# Patient Record
Sex: FEMALE | Race: WHITE | NOT HISPANIC OR LATINO | Employment: OTHER | URBAN - METROPOLITAN AREA
[De-identification: names, ages, dates, MRNs, and addresses within clinical notes are randomized per-mention and may not be internally consistent; named-entity substitution may affect disease eponyms.]

---

## 2017-03-01 ENCOUNTER — OFFICE VISIT (OUTPATIENT)
Dept: MEDICAL GROUP | Facility: LAB | Age: 67
End: 2017-03-01
Payer: MEDICARE

## 2017-03-01 VITALS
RESPIRATION RATE: 16 BRPM | HEART RATE: 112 BPM | OXYGEN SATURATION: 93 % | HEIGHT: 66 IN | DIASTOLIC BLOOD PRESSURE: 102 MMHG | BODY MASS INDEX: 33.06 KG/M2 | TEMPERATURE: 98.1 F | SYSTOLIC BLOOD PRESSURE: 148 MMHG | WEIGHT: 205.69 LBS

## 2017-03-01 DIAGNOSIS — Z13.220 ENCOUNTER FOR LIPID SCREENING FOR CARDIOVASCULAR DISEASE: ICD-10-CM

## 2017-03-01 DIAGNOSIS — E66.9 OBESITY (BMI 30-39.9): ICD-10-CM

## 2017-03-01 DIAGNOSIS — E78.2 MIXED HYPERLIPIDEMIA: ICD-10-CM

## 2017-03-01 DIAGNOSIS — Z12.31 SCREENING MAMMOGRAM, ENCOUNTER FOR: ICD-10-CM

## 2017-03-01 DIAGNOSIS — I10 ESSENTIAL HYPERTENSION: ICD-10-CM

## 2017-03-01 DIAGNOSIS — Z13.6 ENCOUNTER FOR LIPID SCREENING FOR CARDIOVASCULAR DISEASE: ICD-10-CM

## 2017-03-01 DIAGNOSIS — Z00.00 HEALTH MAINTENANCE EXAMINATION: ICD-10-CM

## 2017-03-01 DIAGNOSIS — M17.0 PRIMARY OSTEOARTHRITIS OF BOTH KNEES: ICD-10-CM

## 2017-03-01 DIAGNOSIS — Z13.1 SCREENING FOR DIABETES MELLITUS: ICD-10-CM

## 2017-03-01 PROCEDURE — 3014F SCREEN MAMMO DOC REV: CPT | Performed by: FAMILY MEDICINE

## 2017-03-01 PROCEDURE — 1101F PT FALLS ASSESS-DOCD LE1/YR: CPT | Performed by: FAMILY MEDICINE

## 2017-03-01 PROCEDURE — 1036F TOBACCO NON-USER: CPT | Performed by: FAMILY MEDICINE

## 2017-03-01 PROCEDURE — G8432 DEP SCR NOT DOC, RNG: HCPCS | Performed by: FAMILY MEDICINE

## 2017-03-01 PROCEDURE — G8419 CALC BMI OUT NRM PARAM NOF/U: HCPCS | Performed by: FAMILY MEDICINE

## 2017-03-01 PROCEDURE — G8482 FLU IMMUNIZE ORDER/ADMIN: HCPCS | Performed by: FAMILY MEDICINE

## 2017-03-01 PROCEDURE — 4040F PNEUMOC VAC/ADMIN/RCVD: CPT | Mod: 8P | Performed by: FAMILY MEDICINE

## 2017-03-01 PROCEDURE — 99204 OFFICE O/P NEW MOD 45 MIN: CPT | Performed by: FAMILY MEDICINE

## 2017-03-01 PROCEDURE — 3017F COLORECTAL CA SCREEN DOC REV: CPT | Performed by: FAMILY MEDICINE

## 2017-03-01 RX ORDER — LOSARTAN POTASSIUM 25 MG/1
25 TABLET ORAL DAILY
COMMUNITY
End: 2017-03-01 | Stop reason: SDUPTHER

## 2017-03-01 RX ORDER — LOSARTAN POTASSIUM 25 MG/1
25 TABLET ORAL DAILY
Qty: 90 TAB | Refills: 3 | Status: SHIPPED | OUTPATIENT
Start: 2017-03-01 | End: 2017-09-20 | Stop reason: SDUPTHER

## 2017-03-01 RX ORDER — ASCORBIC ACID 500 MG
500 TABLET ORAL DAILY
COMMUNITY

## 2017-03-01 RX ORDER — OMEGA-3 FATTY ACIDS/FISH OIL 300-1000MG
CAPSULE ORAL
COMMUNITY
End: 2017-09-20

## 2017-03-01 RX ORDER — ASPIRIN 81 MG/1
81 TABLET, CHEWABLE ORAL DAILY
COMMUNITY
End: 2020-10-27

## 2017-03-01 RX ORDER — ACETAMINOPHEN 500 MG
500-1000 TABLET ORAL EVERY 6 HOURS PRN
COMMUNITY
End: 2020-10-27

## 2017-03-01 ASSESSMENT — PATIENT HEALTH QUESTIONNAIRE - PHQ9: CLINICAL INTERPRETATION OF PHQ2 SCORE: 0

## 2017-03-01 NOTE — PROGRESS NOTES
"Jasiel Garrett is a 66 y.o. female here for   Chief Complaint   Patient presents with   • Establish Care       HPI:  Jasiel is a very pleasant 66 y.o. female. She is here today with her  Ernesto. She is retired from the school district. She does not have any children.    1. Mixed hyperlipidemia  This is chronic. The last labs were done in August 2017. She has not been on any medications for this. Her last LDL was 120. She has gained about 30 pounds over the last few months since she moved to [place]. She does not get much exercise.    2. Essential hypertension  This is chronic. Patient is currently on losartan 25 mg daily and aspirin 81 mg daily. She comes in with a blood pressure log today. Her home blood pressure readings are 110-130/70-80. She states that he has been under good control. She does have a wrist blood pressure cuff but her  was a previous ENT and takes her blood pressure manually at times. She denies any headache, chest pain, shortness of breath, lower extremity edema.    3. Obesity (BMI 30-39.9)  This is a new problem. Patient waited about 30 pounds lighter several months ago. She attributes her weight gain to decreased mood and motivation after her move to [place] and the death of her dog. She does not get much exercise because of her arthritis.    4. Primary osteoarthritis of both knees  This is chronic. By report, she is \"bone to bone \"on her knees. This very much decreases her physical activity. She is not considering a total knee replacement as of yet. She has considered steroid injections.    5. Health maintenance examination  Pap: Last done last year, up to date  Mammogram: Last done 2015, patient is due   Colonoscopy: Last done 2016, up to date  DEXA: Last done 2015, up to date  Tetanus booster: Last done over 10 years ago  Pneumonia vaccine: Patient has received both  Shingles vaccine: Done   Flu vaccine: Done yearly   ASA 81mg > 65yrs: Taking regularly   Diet: sweet tooth, otherwise " "healthy whole eating  Exercise: rare d/t pain      Current medicines (including changes today)  Current Outpatient Prescriptions   Medication Sig Dispense Refill   • aspirin (ASA) 81 MG Chew Tab chewable tablet Take 81 mg by mouth every day.     • ascorbic acid (ASCORBIC ACID) 500 MG Tab Take 500 mg by mouth every day.     • vitamin D (CHOLECALCIFEROL) 1000 UNIT Tab Take 1,000 Units by mouth every day.     • Omega 3 1000 MG Cap Take  by mouth.     • acetaminophen (TYLENOL) 500 MG Tab Take 500-1,000 mg by mouth every 6 hours as needed.     • losartan (COZAAR) 25 MG Tab Take 1 Tab by mouth every day. 90 Tab 3     No current facility-administered medications for this visit.     She  has a past medical history of Hypertension and Hyperlipidemia.  She  has past surgical history that includes hysteroscopy with video diagnostic.  Social History   Substance Use Topics   • Smoking status: Former Smoker -- 0.50 packs/day for 20 years     Quit date: 1997   • Smokeless tobacco: Never Used   • Alcohol Use: 1.2 oz/week     2 Glasses of wine per week     Social History     Social History Narrative   • No narrative on file     Family History   Problem Relation Age of Onset   • Arthritis Mother    • Heart Disease Father      arteriosclerosis   • Alcohol/Drug Sister    • Alcohol/Drug Brother      Family Status   Relation Status Death Age   • Mother     • Father     • Sister     • Brother           ROS  Positive for knee pain, anxiety  All other systems reviewed and are negative     Objective:     Blood pressure 148/102, pulse 112, temperature 36.7 °C (98.1 °F), resp. rate 16, height 1.676 m (5' 5.98\"), weight 93.3 kg (205 lb 11 oz), SpO2 93 %. Body mass index is 33.21 kg/(m^2).  Physical Exam:    Constitutional: Alert, no distress.  Skin: Warm, dry, good turgor, no rashes in visible areas.  Eye: Equal, round and reactive, conjunctiva clear, lids normal.  ENMT: Lips without lesions, good " dentition, oropharynx clear. TM's pearly gray with normal light reflexes bilaterally  Neck: Trachea midline, no masses, no thyromegaly. No cervical or supraclavicular lymphadenopathy.  Respiratory: Unlabored respiratory effort, lungs clear to auscultation bilaterally, no wheezes, no ronchi.  Cardiovascular: Normal S1, S2, RRR, no murmur, no edema.  Abdomen: Soft, non-tender, no masses, no hepatosplenomegaly.  Psych: Alert and oriented x3, normal affect and mood.      Assessment and Plan:   The following treatment plan was discussed    1. Mixed hyperlipidemia  Chronic, stable based on previous labs  Recheck in August  Lifestyle modifications discussed  - TSH WITH REFLEX TO FT4; Future  - VITAMIN D,25 HYDROXY; Future    2. Essential hypertension  Chronic, elevated today  Patient states that she has white coat hypertension and does have a blood pressure cuff at home and normal blood pressure logs over the last month  Patient is to keep a log and return if her blood pressure remains high at home  She can bring her blood pressure cuff in here to the office to be checked for accuracy  Continue daily aspirin  Continue losartan 25 mg, we will increase this if her blood pressure remains high  - VITAMIN D,25 HYDROXY; Future  - losartan (COZAAR) 25 MG Tab; Take 1 Tab by mouth every day.  Dispense: 90 Tab; Refill: 3    3. Obesity (BMI 30-39.9)  Counseled today on weight loss, patient has a 20 pound weight loss goal for the next visit in August  - Patient identified as having weight management issue.  Appropriate orders and counseling given.  - VITAMIN D,25 HYDROXY; Future    4. Screening mammogram, encounter for  - MA-SCREEN MAMMO W/CAD-BILAT    5. Health maintenance examination  Labs ordered  Patient declines tetanus  All other vaccines up-to-date  Mammogram ordered  Colonoscopy up-to-date  - LIPID PROFILE; Future  - CBC WITH DIFFERENTIAL; Future  - TSH WITH REFLEX TO FT4; Future  - VITAMIN D,25 HYDROXY; Future  - COMP  METABOLIC PANEL; Future  - HEMOGLOBIN A1C; Future    6. Encounter for lipid screening for cardiovascular disease  - LIPID PROFILE; Future    7. Screening for diabetes mellitus  - HEMOGLOBIN A1C; Future      Records requested.  Followup: Return in about 5 months (around 8/1/2017) for medicare annual, labs.         This note was created using voice recognition software. I have made every reasonable attempt to correct errors, however, I do anticipate some grammatical errors.

## 2017-03-01 NOTE — MR AVS SNAPSHOT
"Shahanaercarol Garrett   3/1/2017 8:20 AM   Office Visit   MRN: [id number]    Department:  [facility]   Dept Phone:  [phone]    Description:  Female : 1950   Provider:  Pia Dominguez M.D.           Reason for Visit     Establish Care           Allergies as of 3/1/2017     No Known Allergies      You were diagnosed with     Obesity (BMI 30-39.9)   [763849]       Screening mammogram, encounter for   [8045071]       Health maintenance examination   [838353]       Encounter for lipid screening for cardiovascular disease   [2427148]       Screening for diabetes mellitus   [V77.1.ICD-9-CM]       Mixed hyperlipidemia   [272.2.ICD-9-CM]       Essential hypertension   [8767591]         Vital Signs     Blood Pressure Pulse Temperature Respirations Height Weight    148/102 mmHg 112 36.7 °C (98.1 °F) 16 1.676 m (5' 5.98\") 93.3 kg (205 lb 11 oz)    Body Mass Index Oxygen Saturation Smoking Status             33.21 kg/m2 93% Former Smoker         Basic Information     Date Of Birth Sex Race Ethnicity Preferred Language    1950 Female White Non- English      Your appointments     Aug 01, 2017  8:40 AM   ANNUAL EXAM PREVENTATIVE with Pia Dominguez M.D.   [facility] (--)    [address] [address] [id number] [phone]              Problem List              ICD-10-CM Priority Class Noted - Resolved    Mixed hyperlipidemia E78.2   3/1/2017 - Present    Essential hypertension I10   3/1/2017 - Present    Obesity (BMI 30-39.9) E66.9   3/1/2017 - Present      Health Maintenance        Date Due Completion Dates    IMM DTaP/Tdap/Td Vaccine (1 - Tdap) 4/10/1970 ---    MAMMOGRAM 4/10/1991 ---    COLONOSCOPY 4/10/2001 ---    IMM ZOSTER VACCINE 4/10/2011 ---    BONE DENSITY 4/10/2016 ---    IMM PNEUMOCOCCAL 65+ (ADULT) LOW/MEDIUM RISK SERIES (1 of 2 - PCV13) 4/10/2016 ---            Current Immunizations     Influenza TIV (IM) 2016      Below " and/or attached are the medications your provider expects you to take. Review all of your home medications and newly ordered medications with your provider and/or pharmacist. Follow medication instructions as directed by your provider and/or pharmacist. Please keep your medication list with you and share with your provider. Update the information when medications are discontinued, doses are changed, or new medications (including over-the-counter products) are added; and carry medication information at all times in the event of emergency situations     Allergies:  No Known Allergies          Medications  Valid as of: March 01, 2017 -  8:55 AM    Generic Name Brand Name Tablet Size Instructions for use    Acetaminophen (Tab) TYLENOL 500 MG Take 500-1,000 mg by mouth every 6 hours as needed.        Ascorbic Acid (Tab) ascorbic acid 500 MG Take 500 mg by mouth every day.        Aspirin (Chew Tab) ASA 81 MG Take 81 mg by mouth every day.        Cholecalciferol (Tab) cholecalciferol 1000 UNIT Take 1,000 Units by mouth every day.        Losartan Potassium (Tab) COZAAR 25 MG Take 25 mg by mouth every day.        Omega-3 Fatty Acids (Cap) Omega 3 1000 MG Take  by mouth.        .                 Medicines prescribed today were sent to:     [facility] [address] [place], [place] - [address] [address] [address]    Phone: [phone] Fax: [phone]    Open 24 Hours?: No      Medication refill instructions:       If your prescription bottle indicates you have medication refills left, it is not necessary to call your provider’s office. Please contact your pharmacy and they will refill your medication.    If your prescription bottle indicates you do not have any refills left, you may request refills at any time through one of the following ways: The online ApplyKit system (except Urgent Care), by calling your provider’s office, or by asking your pharmacy to contact your provider’s office with a refill request.  Medication refills are processed only during regular business hours and may not be available until the next business day. Your provider may request additional information or to have a follow-up visit with you prior to refilling your medication.   *Please Note: Medication refills are assigned a new Rx number when refilled electronically. Your pharmacy may indicate that no refills were authorized even though a new prescription for the same medication is available at the pharmacy. Please request the medicine by name with the pharmacy before contacting your provider for a refill.        Your To Do List     Future Labs/Procedures Complete By Expires    CBC WITH DIFFERENTIAL  As directed 3/2/2018    COMP METABOLIC PANEL  As directed 3/2/2018    HEMOGLOBIN A1C  As directed 3/2/2018    LIPID PROFILE  As directed 3/2/2018    TSH WITH REFLEX TO FT4  As directed 3/1/2018    VITAMIN D,25 HYDROXY  As directed 3/2/2018         MedAptus Access Code: [email or web address]  Expires: 3/31/2017  7:30 AM    MedAptus  A secure, online tool to manage your health information     [employer]’s MedAptus® is a secure, online tool that connects you to your personalized health information from the privacy of your home -- day or night - making it very easy for you to manage your healthcare. Once the activation process is completed, you can even access your medical information using the MedAptus jesus, which is available for free in the Apple Jesus store or Google Play store.     MedAptus provides the following levels of access (as shown below):   My Chart Features   [facility] Primary [facility] Doctor [facility]  Specialists [facility]  Urgent  Care Non-[facility]  Primary Care  Doctor   Email your healthcare team securely and privately 24/7 X X X    Manage appointments: schedule your next appointment; view details of past/upcoming appointments X      Request prescription refills. X      View recent personal medical records, including lab and immunizations X X X X   View  health record, including health history, allergies, medications X X X X   Read reports about your outpatient visits, procedures, consult and ER notes X X X X   See your discharge summary, which is a recap of your hospital and/or ER visit that includes your diagnosis, lab results, and care plan. X X       How to register for Intent Media:  1. Go to  [email or web address].  2. Click on the Sign Up Now box, which takes you to the New Member Sign Up page. You will need to provide the following information:  a. Enter your Intent Media Access Code exactly as it appears at the top of this page. (You will not need to use this code after you’ve completed the sign-up process. If you do not sign up before the expiration date, you must request a new code.)   b. Enter your date of birth.   c. Enter your home email address.   d. Click Submit, and follow the next screen’s instructions.  3. Create a Intent Media ID. This will be your Intent Media login ID and cannot be changed, so think of one that is secure and easy to remember.  4. Create a [employer] password. You can change your password at any time.  5. Enter your Password Reset Question and Answer. This can be used at a later time if you forget your password.   6. Enter your e-mail address. This allows you to receive e-mail notifications when new information is available in Intent Media.  7. Click Sign Up. You can now view your health information.    For assistance activating your Intent Media account, call [phone]

## 2017-05-09 ENCOUNTER — HOSPITAL ENCOUNTER (OUTPATIENT)
Dept: RADIOLOGY | Facility: MEDICAL CENTER | Age: 67
End: 2017-05-09
Attending: FAMILY MEDICINE
Payer: MEDICARE

## 2017-05-09 PROCEDURE — 77063 BREAST TOMOSYNTHESIS BI: CPT

## 2017-05-31 ENCOUNTER — HOSPITAL ENCOUNTER (OUTPATIENT)
Dept: RADIOLOGY | Facility: MEDICAL CENTER | Age: 67
End: 2017-05-31

## 2017-07-14 ENCOUNTER — HOSPITAL ENCOUNTER (OUTPATIENT)
Dept: LAB | Facility: MEDICAL CENTER | Age: 67
End: 2017-07-14
Attending: FAMILY MEDICINE
Payer: MEDICARE

## 2017-07-14 DIAGNOSIS — Z13.220 ENCOUNTER FOR LIPID SCREENING FOR CARDIOVASCULAR DISEASE: ICD-10-CM

## 2017-07-14 DIAGNOSIS — E78.2 MIXED HYPERLIPIDEMIA: ICD-10-CM

## 2017-07-14 DIAGNOSIS — Z00.00 HEALTH MAINTENANCE EXAMINATION: ICD-10-CM

## 2017-07-14 DIAGNOSIS — Z13.1 SCREENING FOR DIABETES MELLITUS: ICD-10-CM

## 2017-07-14 DIAGNOSIS — I10 ESSENTIAL HYPERTENSION: ICD-10-CM

## 2017-07-14 DIAGNOSIS — E66.9 OBESITY (BMI 30-39.9): ICD-10-CM

## 2017-07-14 DIAGNOSIS — Z13.6 ENCOUNTER FOR LIPID SCREENING FOR CARDIOVASCULAR DISEASE: ICD-10-CM

## 2017-07-14 PROBLEM — E05.90 HYPERTHYROIDISM: Status: ACTIVE | Noted: 2017-07-14

## 2017-07-14 LAB
25(OH)D3 SERPL-MCNC: 35 NG/ML (ref 30–100)
ALBUMIN SERPL BCP-MCNC: 3.9 G/DL (ref 3.2–4.9)
ALBUMIN/GLOB SERPL: 1.3 G/DL
ALP SERPL-CCNC: 66 U/L (ref 30–99)
ALT SERPL-CCNC: 21 U/L (ref 2–50)
ANION GAP SERPL CALC-SCNC: 5 MMOL/L (ref 0–11.9)
AST SERPL-CCNC: 18 U/L (ref 12–45)
BASOPHILS # BLD AUTO: 0.8 % (ref 0–1.8)
BASOPHILS # BLD: 0.04 K/UL (ref 0–0.12)
BILIRUB SERPL-MCNC: 0.7 MG/DL (ref 0.1–1.5)
BUN SERPL-MCNC: 18 MG/DL (ref 8–22)
CALCIUM SERPL-MCNC: 9.7 MG/DL (ref 8.5–10.5)
CHLORIDE SERPL-SCNC: 107 MMOL/L (ref 96–112)
CHOLEST SERPL-MCNC: 178 MG/DL (ref 100–199)
CO2 SERPL-SCNC: 26 MMOL/L (ref 20–33)
CREAT SERPL-MCNC: 0.81 MG/DL (ref 0.5–1.4)
EOSINOPHIL # BLD AUTO: 0.22 K/UL (ref 0–0.51)
EOSINOPHIL NFR BLD: 4.3 % (ref 0–6.9)
ERYTHROCYTE [DISTWIDTH] IN BLOOD BY AUTOMATED COUNT: 45.2 FL (ref 35.9–50)
EST. AVERAGE GLUCOSE BLD GHB EST-MCNC: 111 MG/DL
GFR SERPL CREATININE-BSD FRML MDRD: >60 ML/MIN/1.73 M 2
GLOBULIN SER CALC-MCNC: 2.9 G/DL (ref 1.9–3.5)
GLUCOSE SERPL-MCNC: 91 MG/DL (ref 65–99)
HBA1C MFR BLD: 5.5 % (ref 0–5.6)
HCT VFR BLD AUTO: 48.7 % (ref 37–47)
HDLC SERPL-MCNC: 51 MG/DL
HGB BLD-MCNC: 15.8 G/DL (ref 12–16)
IMM GRANULOCYTES # BLD AUTO: 0.01 K/UL (ref 0–0.11)
IMM GRANULOCYTES NFR BLD AUTO: 0.2 % (ref 0–0.9)
LDLC SERPL CALC-MCNC: 108 MG/DL
LYMPHOCYTES # BLD AUTO: 1.73 K/UL (ref 1–4.8)
LYMPHOCYTES NFR BLD: 33.7 % (ref 22–41)
MCH RBC QN AUTO: 30.6 PG (ref 27–33)
MCHC RBC AUTO-ENTMCNC: 32.4 G/DL (ref 33.6–35)
MCV RBC AUTO: 94.4 FL (ref 81.4–97.8)
MONOCYTES # BLD AUTO: 0.6 K/UL (ref 0–0.85)
MONOCYTES NFR BLD AUTO: 11.7 % (ref 0–13.4)
NEUTROPHILS # BLD AUTO: 2.54 K/UL (ref 2–7.15)
NEUTROPHILS NFR BLD: 49.3 % (ref 44–72)
NRBC # BLD AUTO: 0 K/UL
NRBC BLD AUTO-RTO: 0 /100 WBC
PLATELET # BLD AUTO: 225 K/UL (ref 164–446)
PMV BLD AUTO: 11 FL (ref 9–12.9)
POTASSIUM SERPL-SCNC: 4.2 MMOL/L (ref 3.6–5.5)
PROT SERPL-MCNC: 6.8 G/DL (ref 6–8.2)
RBC # BLD AUTO: 5.16 M/UL (ref 4.2–5.4)
SODIUM SERPL-SCNC: 138 MMOL/L (ref 135–145)
T4 FREE SERPL-MCNC: 1.51 NG/DL (ref 0.53–1.43)
TRIGL SERPL-MCNC: 95 MG/DL (ref 0–149)
TSH SERPL DL<=0.005 MIU/L-ACNC: <0.015 UIU/ML (ref 0.3–3.7)
WBC # BLD AUTO: 5.1 K/UL (ref 4.8–10.8)

## 2017-07-14 PROCEDURE — 85025 COMPLETE CBC W/AUTO DIFF WBC: CPT

## 2017-07-14 PROCEDURE — 82306 VITAMIN D 25 HYDROXY: CPT | Mod: GA

## 2017-07-14 PROCEDURE — 80061 LIPID PANEL: CPT

## 2017-07-14 PROCEDURE — 84443 ASSAY THYROID STIM HORMONE: CPT

## 2017-07-14 PROCEDURE — 36415 COLL VENOUS BLD VENIPUNCTURE: CPT

## 2017-07-14 PROCEDURE — 80053 COMPREHEN METABOLIC PANEL: CPT

## 2017-07-14 PROCEDURE — 83036 HEMOGLOBIN GLYCOSYLATED A1C: CPT | Mod: GA

## 2017-07-14 PROCEDURE — 84439 ASSAY OF FREE THYROXINE: CPT

## 2017-07-31 ENCOUNTER — TELEPHONE (OUTPATIENT)
Dept: MEDICAL GROUP | Facility: LAB | Age: 67
End: 2017-07-31

## 2017-07-31 NOTE — TELEPHONE ENCOUNTER
ESTABLISHED PATIENT PRE-VISIT PLANNING     Note: Patient will not be contacted if there is no indication to call.     1.  Reviewed notes from the last few office visits within the medical group: Yes.  Only ov on 3/1/17 to establish care.  Lived in [place].     2.  If any orders were placed at last visit or intended to be done for this visit (i.e. 6 mos follow-up), do we have Results/Consult Notes?        •  Labs - Labs ordered, completed and results are in chart.  Done on 7/14/17.         •  Imaging - Imaging ordered, completed and results are in chart.  Mammogram completed on 6/16/17.  Colonoscopy on 2/17/16 and bone density 8/31/12 both done in [place].         •  Referrals - No referrals were ordered at last office visit.    3. Is this appointment scheduled as a Hospital Follow-Up? No    4.  Immunizations were [facility] using WebIZ?: Yes       •  Web Iz Recommendations: PREVNAR (PCV13) , TDAP and ZOSTAVAX (Shingles)    5.  Patient is due for the following Health Maintenance Topics:   Health Maintenance Due   Topic Date Due   • Annual Wellness Visit  1950   • IMM DTaP/Tdap/Td Vaccine (1 - Tdap) 04/10/1970   • IMM ZOSTER VACCINE  04/10/2011   • IMM PNEUMOCOCCAL 65+ (ADULT) LOW/MEDIUM RISK SERIES (1 of 2 - PCV13) 04/10/2016       - Patient has completed FLU Immunization(s) per WebIZ. Chart has been updated.    6.  Patient was NOT informed to arrive 15 min prior to their scheduled appointment and bring in their medication bottles.

## 2017-08-01 ENCOUNTER — OFFICE VISIT (OUTPATIENT)
Dept: MEDICAL GROUP | Facility: LAB | Age: 67
End: 2017-08-01
Payer: MEDICARE

## 2017-08-01 ENCOUNTER — HOSPITAL ENCOUNTER (OUTPATIENT)
Dept: LAB | Facility: MEDICAL CENTER | Age: 67
End: 2017-08-01
Attending: FAMILY MEDICINE
Payer: MEDICARE

## 2017-08-01 VITALS
OXYGEN SATURATION: 97 % | HEART RATE: 120 BPM | SYSTOLIC BLOOD PRESSURE: 160 MMHG | DIASTOLIC BLOOD PRESSURE: 112 MMHG | TEMPERATURE: 98.3 F | WEIGHT: 207 LBS | RESPIRATION RATE: 16 BRPM | HEIGHT: 66 IN | BODY MASS INDEX: 33.27 KG/M2

## 2017-08-01 DIAGNOSIS — Z00.00 MEDICARE ANNUAL WELLNESS VISIT, INITIAL: ICD-10-CM

## 2017-08-01 DIAGNOSIS — I10 ESSENTIAL HYPERTENSION: ICD-10-CM

## 2017-08-01 DIAGNOSIS — E05.90 HYPERTHYROIDISM: ICD-10-CM

## 2017-08-01 DIAGNOSIS — K59.01 SLOW TRANSIT CONSTIPATION: ICD-10-CM

## 2017-08-01 DIAGNOSIS — M17.0 PRIMARY OSTEOARTHRITIS OF BOTH KNEES: ICD-10-CM

## 2017-08-01 DIAGNOSIS — E66.9 OBESITY (BMI 30-39.9): ICD-10-CM

## 2017-08-01 DIAGNOSIS — E78.2 MIXED HYPERLIPIDEMIA: ICD-10-CM

## 2017-08-01 PROBLEM — Z91.81 RISK FOR FALLS: Status: ACTIVE | Noted: 2017-08-01

## 2017-08-01 LAB
T3 SERPL-MCNC: 152.8 NG/DL (ref 60–181)
T4 FREE SERPL-MCNC: 1.47 NG/DL (ref 0.53–1.43)
THYROPEROXIDASE AB SERPL-ACNC: 5 IU/ML (ref 0–9)
TSH SERPL DL<=0.005 MIU/L-ACNC: <0.015 UIU/ML (ref 0.3–3.7)

## 2017-08-01 PROCEDURE — 84439 ASSAY OF FREE THYROXINE: CPT

## 2017-08-01 PROCEDURE — 84443 ASSAY THYROID STIM HORMONE: CPT

## 2017-08-01 PROCEDURE — 36415 COLL VENOUS BLD VENIPUNCTURE: CPT

## 2017-08-01 PROCEDURE — G0438 PPPS, INITIAL VISIT: HCPCS | Performed by: FAMILY MEDICINE

## 2017-08-01 PROCEDURE — 86376 MICROSOMAL ANTIBODY EACH: CPT

## 2017-08-01 PROCEDURE — 84480 ASSAY TRIIODOTHYRONINE (T3): CPT

## 2017-08-01 PROCEDURE — 86800 THYROGLOBULIN ANTIBODY: CPT

## 2017-08-01 RX ORDER — ATENOLOL 50 MG/1
50 TABLET ORAL DAILY
Qty: 30 TAB | Refills: 3 | Status: SHIPPED | OUTPATIENT
Start: 2017-08-01 | End: 2017-09-20

## 2017-08-01 RX ORDER — METHIMAZOLE 5 MG/1
5 TABLET ORAL 3 TIMES DAILY
Qty: 90 TAB | Refills: 2 | Status: SHIPPED | OUTPATIENT
Start: 2017-08-01 | End: 2017-09-12

## 2017-08-01 ASSESSMENT — PATIENT HEALTH QUESTIONNAIRE - PHQ9
SUM OF ALL RESPONSES TO PHQ QUESTIONS 1-9: 5
CLINICAL INTERPRETATION OF PHQ2 SCORE: 2
5. POOR APPETITE OR OVEREATING: 0 - NOT AT ALL

## 2017-08-01 NOTE — MR AVS SNAPSHOT
"        Jasiel Tami   2017 8:40 AM   Office Visit   MRN: [id number]    Department:  [facility]   Dept Phone:  [phone]    Description:  Female : 1950   Provider:  Pia Dominguez M.D.           Reason for Visit     Annual Exam           Allergies as of 2017     No Known Allergies      You were diagnosed with     Medicare annual wellness visit, initial   [[id number]]       Hyperthyroidism   [470288]         Vital Signs     Blood Pressure Pulse Temperature Respirations Height Weight    160/112 mmHg 120 36.8 °C (98.3 °F) 16 1.676 m (5' 5.98\") 93.895 kg (207 lb)    Body Mass Index Oxygen Saturation Smoking Status             33.43 kg/m2 97% Former Smoker         Basic Information     Date Of Birth Sex Race Ethnicity Preferred Language    1950 Female White Non- English      Your appointments     Aug 22, 2017 11:40 AM   Established Patient with Pia Dominguez M.D.   [facility] (--)    [address] [address] [id number] [phone]           You will be receiving a confirmation call a few days before your appointment from our automated call confirmation system.              Problem List              ICD-10-CM Priority Class Noted - Resolved    Mixed hyperlipidemia E78.2   3/1/2017 - Present    Essential hypertension I10   3/1/2017 - Present    Obesity (BMI 30-39.9) E66.9   3/1/2017 - Present    Primary osteoarthritis of both knees M17.0   3/1/2017 - Present    Hyperthyroidism E05.90   2017 - Present      Health Maintenance        Date Due Completion Dates    IMM DTaP/Tdap/Td Vaccine (1 - Tdap) 4/10/1970 ---    IMM ZOSTER VACCINE 4/10/2011 ---    IMM PNEUMOCOCCAL 65+ (ADULT) LOW/MEDIUM RISK SERIES (1 of 2 - PCV13) 4/10/2016 ---    BONE DENSITY 2017    IMM INFLUENZA (1) 2016    MAMMOGRAM 2019, 2015, 2015, 2012, 2011, 2011, 2010, 2009, 2008, " 2/6/2008    COLONOSCOPY 2/17/2026 2/17/2016            Current Immunizations     Influenza TIV (IM) 12/22/2016      Below and/or attached are the medications your provider expects you to take. Review all of your home medications and newly ordered medications with your provider and/or pharmacist. Follow medication instructions as directed by your provider and/or pharmacist. Please keep your medication list with you and share with your provider. Update the information when medications are discontinued, doses are changed, or new medications (including over-the-counter products) are added; and carry medication information at all times in the event of emergency situations     Allergies:  No Known Allergies          Medications  Valid as of: August 01, 2017 -  9:13 AM    Generic Name Brand Name Tablet Size Instructions for use    Acetaminophen (Tab) TYLENOL 500 MG Take 500-1,000 mg by mouth every 6 hours as needed.        Ascorbic Acid (Tab) ascorbic acid 500 MG Take 500 mg by mouth every day.        Aspirin (Chew Tab) ASA 81 MG Take 81 mg by mouth every day.        Atenolol (Tab) TENORMIN 50 MG Take 1 Tab by mouth every day.        Cholecalciferol (Tab) cholecalciferol 1000 UNIT Take 1,000 Units by mouth every day.        Losartan Potassium (Tab) COZAAR 25 MG Take 1 Tab by mouth every day.        MethIMAzole (Tab) TAPAZOLE 5 MG Take 1 Tab by mouth 3 times a day.        Omega-3 Fatty Acids (Cap) Omega 3 1000 MG Take  by mouth.        Shark Cartilage   Take  by mouth.        .                 Medicines prescribed today were sent to:     [facility] [place] [facility] [address] [facility]    [address] [facility] [address]    Phone: [phone] Fax: [phone]    Open 24 Hours?: No      Medication refill instructions:       If your prescription bottle indicates you have medication refills left, it is not necessary to call your provider’s office. Please contact your pharmacy and they will refill your medication.    If your  prescription bottle indicates you do not have any refills left, you may request refills at any time through one of the following ways: The online Monitor My Meds system (except Urgent Care), by calling your provider’s office, or by asking your pharmacy to contact your provider’s office with a refill request. Medication refills are processed only during regular business hours and may not be available until the next business day. Your provider may request additional information or to have a follow-up visit with you prior to refilling your medication.   *Please Note: Medication refills are assigned a new Rx number when refilled electronically. Your pharmacy may indicate that no refills were authorized even though a new prescription for the same medication is available at the pharmacy. Please request the medicine by name with the pharmacy before contacting your provider for a refill.        Your To Do List     Future Labs/Procedures Complete By Expires    ANTITHYROGLOBULIN AB  As directed 8/2/2018    FREE THYROXINE  As directed 8/2/2018    NM-THYROID UPTAKE 5HR SCAN  As directed 8/1/2018    THYROID PEROXIDASE  (TPO) AB  As directed 8/2/2018    TRIIDOTHYRONINE  As directed 8/1/2018    TSH  As directed 8/2/2018      Referral     A referral request has been sent to our patient care coordination department. Please allow 3-5 business days for us to process this request and contact you either by phone or mail. If you do not hear from us by the 5th business day, please call us at [phone].           Monitor My Meds Access Code: Activation code not generated  Current Monitor My Meds Status: Active

## 2017-08-01 NOTE — PROGRESS NOTES
Chief Complaint   Patient presents with   • Annual Exam         HPI:  Jasiel is a 67 y.o. here for Medicare Annual Wellness Visit    Patient Active Problem List    Diagnosis Date Noted   • Slow transit constipation 08/01/2017   • Risk for falls 08/01/2017   • Hyperthyroidism 07/14/2017   • Mixed hyperlipidemia 03/01/2017   • Essential hypertension 03/01/2017   • Obesity (BMI 30-39.9) 03/01/2017   • Primary osteoarthritis of both knees 03/01/2017       Current medicines including changes today:   Current Outpatient Prescriptions   Medication Sig Dispense Refill   • SHARK CARTILAGE PO Take  by mouth.     • methimazole (TAPAZOLE) 5 MG Tab Take 1 Tab by mouth 3 times a day. 90 Tab 2   • atenolol (TENORMIN) 50 MG Tab Take 1 Tab by mouth every day. 30 Tab 3   • aspirin (ASA) 81 MG Chew Tab chewable tablet Take 81 mg by mouth every day.     • ascorbic acid (ASCORBIC ACID) 500 MG Tab Take 500 mg by mouth every day.     • vitamin D (CHOLECALCIFEROL) 1000 UNIT Tab Take 1,000 Units by mouth every day.     • Omega 3 1000 MG Cap Take  by mouth.     • acetaminophen (TYLENOL) 500 MG Tab Take 500-1,000 mg by mouth every 6 hours as needed.     • losartan (COZAAR) 25 MG Tab Take 1 Tab by mouth every day. 90 Tab 3     No current facility-administered medications for this visit.        The patient reports adherence to this regimen   Current supplements as per medication list.   Chronic narcotic pain medicines: no     Allergies: Review of patient's allergies indicates no known allergies.    Current social contact/activities: , social events   Exercise: walking    She  reports that she quit smoking about 20 years ago. She has never used smokeless tobacco. She reports that she drinks about 1.2 oz of alcohol per week. She reports that she does not use illicit drugs.  Counseling given: Not Answered        DPA/Advanced directive: No    ROS:    Gait: Uses no assistive device   Ostomy: no   Other tubes: no   Amputations: no   Chronic  oxygen use no   Last eye exam yearly   : Denies incontinence.     Screening:  Depression Screening    Little interest or pleasure in doing things?  1 - several days  Feeling down, depressed , or hopeless? 1 - several days  Trouble falling or staying asleep, or sleeping too much?  1 - several days  Feeling tired or having little energy?  1 - several days  Poor appetite or overeating?  0 - not at all  Feeling bad about yourself - or that you are a failure or have let yourself or your family down? 1 - several days  Trouble concentrating on things, such as reading the newspaper or watching television? 0 - not at all  Moving or speaking so slowly that other people could have noticed.  Or the opposite - being so fidgety or restless that you have been moving around a lot more than usual?  0 - not at all  Thoughts that you would be better off dead, or of hurting yourself?  0 - not at all  Patient Health Questionnaire Score: 5    If depressive symptoms identified deferred to follow up visit unless specifically addressed in assessment and plan.    Interpretation of PHQ-9 Total Score   Score Severity   1-4 No Depression   5-9 Mild Depression   10-14 Moderate Depression   15-19 Moderately Severe Depression   20-27 Severe Depression      Screening for Cognitive Impairment    Three Minute Recall (apple, watch, larry)  3/3    Draw clock face with all 12 numbers set to the hand to show 10 minutes past 11 o'clock  1    Cognitive concerns identified deferred for follow up unless specifically addressed in assessment and plan.    Fall Risk Assessment    Has the patient had two or more falls in the last year or any fall with injury in the last year?  Yes    Safety Assessment    Throw rugs on floor.  Yes  Handrails on all stairs.  Yes  Good lighting in all hallways.  Yes  Difficulty hearing.  No  Patient counseled about all safety risks that were identified.    Functional Assessment ADLs    Are there any barriers preventing you from  cooking for yourself or meeting nutritional needs?  No.    Are there any barriers preventing you from driving safely or obtaining transportation?  No.    Are there any barriers preventing you from using a telephone or calling for help?  No.    Are there any barriers preventing you from shopping?  No.    Are there any barriers preventing you from taking care of your own finances?  No.    Are there any barriers preventing you from managing your medications?  No.    Are currently engaging any exercise or physical activity?  Yes.       Health Maintenance Summary                Annual Wellness Visit Overdue 1950     IMM DTaP/Tdap/Td Vaccine Overdue 4/10/1970     IMM ZOSTER VACCINE Overdue 4/10/2011     IMM PNEUMOCOCCAL 65+ (ADULT) LOW/MEDIUM RISK SERIES Overdue 4/10/2016     BONE DENSITY Next Due 8/31/2017      Done 8/31/2012 DS-BONE DENSITY STUDY (DEXA)    IMM INFLUENZA Next Due 9/1/2017      Done 12/22/2016 Imm Admin: Influenza TIV (IM)    MAMMOGRAM Next Due 5/9/2019      Done 5/9/2017 MA-MAMMO SCREENING BILAT W/TOMOSYNTHESIS W/CAD     Patient has more history with this topic...    COLONOSCOPY Next Due 2/17/2026      Done 2/17/2016 REFERRAL TO GI FOR COLONOSCOPY          Patient Care Team:  Pia Dominguez M.D. as PCP - General (Family Medicine)      Social History   Substance Use Topics   • Smoking status: Former Smoker -- 0.50 packs/day for 20 years     Quit date: 03/01/1997   • Smokeless tobacco: Never Used   • Alcohol Use: 1.2 oz/week     2 Glasses of wine per week     Family History   Problem Relation Age of Onset   • Arthritis Mother    • Heart Disease Father      arteriosclerosis   • Alcohol/Drug Sister    • Alcohol/Drug Brother      She  has a past medical history of Hypertension and Hyperlipidemia.   Past Surgical History   Procedure Laterality Date   • Hysteroscopy with video diagnostic             Exam:     Blood pressure 160/112, pulse 120, temperature 36.8 °C (98.3 °F), resp. rate 16, height  "1.676 m (5' 5.98\"), weight 93.895 kg (207 lb), SpO2 97 %. Body mass index is 33.43 kg/(m^2).    Hearing good.    Dentition good  Alert, oriented in no acute distress.  Eye contact is good, speech goal directed, affect calm    Assessment and Plan. The following treatment and monitoring plan is recommended:      1. Medicare annual wellness visit, initial  HRA reviewed  HCM UTD per patient. Requested colonoscopy report from [facility]   Advanced directive paperwork given  - Initial Wellness Visit - Includes PPPS ()    2. Hyperthyroidism  New, found on labs. Patient is having significant symptoms of heart racing, depression, mood changes, irritability, constipation, rash, pruritus, hair loss. No diarrhea, no chest pain, no palpitations.  Given her significant symptoms, I will start her on methimazole and atenolol  I have referred her to endocrinology  Thyroid uptake scan ordered as well as repeat labs  No ophthalmopathy on exam  - THYROID PEROXIDASE  (TPO) AB; Future  - ANTITHYROGLOBULIN AB; Future  - TSH; Future  - FREE THYROXINE; Future  - TRIIDOTHYRONINE; Future  - REFERRAL TO ENDOCRINOLOGY  - NM-THYROID UPTAKE 5HR SCAN; Future  - methimazole (TAPAZOLE) 5 MG Tab; Take 1 Tab by mouth 3 times a day.  Dispense: 90 Tab; Refill: 2  - atenolol (TENORMIN) 50 MG Tab; Take 1 Tab by mouth every day.  Dispense: 30 Tab; Refill: 3  - Initial Wellness Visit - Includes PPPS ()    3. Slow transit constipation  New, discuss adding MiraLAX, fiber, increase water intake  - Initial Wellness Visit - Includes PPPS ()    4. Essential hypertension  Chronic, unstable here but her home blood pressure readings are very normal.  Likely white coat hypertension  We will recheck in a quiet dark room at the next appointment  - Initial Wellness Visit - Includes PPPS ()    5. Mixed hyperlipidemia  Chronic, stable, labs reviewed  - Initial Wellness Visit - Includes PPPS ()    6. Obesity (BMI 30-39.9)  Chronic, worsening, " difficult time with weight loss  We will try to get her thyroid under control to see if this helps  - Patient identified as having weight management issue.  Appropriate orders and counseling given.  - Initial Wellness Visit - Includes PPPS ()    7. Primary osteoarthritis of both knees  Chronic, stable, they have brought in records of her previous orthopedic referral and previous x-rays. These have been reviewed. Arthritis is mild.  - Initial Wellness Visit - Includes PPPS ()        Services needed: None  Health Care Screening recommendations as per orders if indicated.  Referrals offered: PT/OT/Nutrition counseling/Behavioral Health/Smoking cessation as per orders if indicated.    Discussion today about general wellness and lifestyle habits:    · Prevent falls and reduce trip hazards; Cautioned about securing or removing rugs.  · Have a working fire alarm and carbon monoxide detector;   · Engage in regular physical activity and social activities          Follow-up: Return in about 3 weeks (around 8/22/2017).  5 YEAR PLAN:  Flu vaccine advised every year.  Colon cancer screening per GI recommendation .

## 2017-08-04 LAB — THYROGLOB AB SERPL-ACNC: <0.9 IU/ML (ref 0–4)

## 2017-08-14 ENCOUNTER — OFFICE VISIT (OUTPATIENT)
Dept: ENDOCRINOLOGY | Facility: MEDICAL CENTER | Age: 67
End: 2017-08-14
Payer: MEDICARE

## 2017-08-14 VITALS
DIASTOLIC BLOOD PRESSURE: 92 MMHG | OXYGEN SATURATION: 96 % | WEIGHT: 211.4 LBS | HEIGHT: 66 IN | HEART RATE: 75 BPM | BODY MASS INDEX: 33.97 KG/M2 | SYSTOLIC BLOOD PRESSURE: 138 MMHG

## 2017-08-14 DIAGNOSIS — E66.9 OBESITY (BMI 30-39.9): ICD-10-CM

## 2017-08-14 DIAGNOSIS — E05.90 HYPERTHYROIDISM: ICD-10-CM

## 2017-08-14 PROCEDURE — 99204 OFFICE O/P NEW MOD 45 MIN: CPT | Performed by: INTERNAL MEDICINE

## 2017-08-14 NOTE — MR AVS SNAPSHOT
"Jasiel Mendiolaz   2017 9:20 AM   Office Visit   MRN: [id number]    Department:  Endocrinology Med [facility]   Dept Phone:  [phone]    Description:  Female : 1950   Provider:  Denny Ireland M.D.           Allergies as of 2017     No Known Allergies      You were diagnosed with     Hyperthyroidism   [224074]         Vital Signs     Blood Pressure Pulse Height Weight Body Mass Index Oxygen Saturation    138/92 mmHg 75 1.676 m (5' 5.98\") 95.89 kg (211 lb 6.4 oz) 34.14 kg/m2 96%    Smoking Status                   Former Smoker           Basic Information     Date Of Birth Sex Race Ethnicity Preferred Language    1950 Female White Non- English      Your appointments     Aug 22, 2017 11:40 AM   Established Patient with Pia Dominguez M.D.   [facility] (--)    [address]  [place] [address] [phone]           You will be receiving a confirmation call a few days before your appointment from our automated call confirmation system.            Sep 12, 2017  9:20 AM   Established Patient with Denny Ireland M.D.   [facility]    [address]  [place] [address] [phone]           You will be receiving a confirmation call a few days before your appointment from our automated call confirmation system.              Problem List              ICD-10-CM Priority Class Noted - Resolved    Mixed hyperlipidemia E78.2   3/1/2017 - Present    Essential hypertension I10   3/1/2017 - Present    Obesity (BMI 30-39.9) E66.9   3/1/2017 - Present    Primary osteoarthritis of both knees M17.0   3/1/2017 - Present    Hyperthyroidism E05.90   2017 - Present    Slow transit constipation K59.01   2017 - Present    Risk for falls Z91.81   2017 - Present      Health Maintenance        Date Due Completion Dates    IMM DTaP/Tdap/Td Vaccine (1 - Tdap) 4/10/1970 ---    IMM ZOSTER " VACCINE 4/10/2011 ---    IMM PNEUMOCOCCAL 65+ (ADULT) LOW/MEDIUM RISK SERIES (1 of 2 - PCV13) 4/10/2016 ---    BONE DENSITY 8/31/2017 8/31/2012    IMM INFLUENZA (1) 9/1/2017 12/22/2016    MAMMOGRAM 5/9/2019 5/9/2017, 9/8/2015, 8/28/2015, 7/24/2012, 6/9/2011, 6/9/2011, 5/25/2010, 4/7/2009, 2/13/2008, 2/6/2008    COLONOSCOPY 2/20/2026 2/20/2016, 2/17/2016            Current Immunizations     Influenza TIV (IM) 12/22/2016      Below and/or attached are the medications your provider expects you to take. Review all of your home medications and newly ordered medications with your provider and/or pharmacist. Follow medication instructions as directed by your provider and/or pharmacist. Please keep your medication list with you and share with your provider. Update the information when medications are discontinued, doses are changed, or new medications (including over-the-counter products) are added; and carry medication information at all times in the event of emergency situations     Allergies:  No Known Allergies          Medications  Valid as of: August 14, 2017 - 10:00 AM    Generic Name Brand Name Tablet Size Instructions for use    Acetaminophen (Tab) TYLENOL 500 MG Take 500-1,000 mg by mouth every 6 hours as needed.        Ascorbic Acid (Tab) ascorbic acid 500 MG Take 500 mg by mouth every day.        Aspirin (Chew Tab) ASA 81 MG Take 81 mg by mouth every day.        Atenolol (Tab) TENORMIN 50 MG Take 1 Tab by mouth every day.        Cholecalciferol (Tab) cholecalciferol 1000 UNIT Take 1,000 Units by mouth every day.        Losartan Potassium (Tab) COZAAR 25 MG Take 1 Tab by mouth every day.        MethIMAzole (Tab) TAPAZOLE 5 MG Take 1 Tab by mouth 3 times a day.        Omega-3 Fatty Acids (Cap) Omega 3 1000 MG Take  by mouth.        Shark Cartilage   Take  by mouth.        .                 Medicines prescribed today were sent to:     [id number] [place] [address] [address] [facility] [place]    [address] [place]  [address]    Phone: [phone] Fax: [phone]    Open 24 Hours?: No      Medication refill instructions:       If your prescription bottle indicates you have medication refills left, it is not necessary to call your provider’s office. Please contact your pharmacy and they will refill your medication.    If your prescription bottle indicates you do not have any refills left, you may request refills at any time through one of the following ways: The online [employer] system (except Urgent Care), by calling your provider’s office, or by asking your pharmacy to contact your provider’s office with a refill request. Medication refills are processed only during regular business hours and may not be available until the next business day. Your provider may request additional information or to have a follow-up visit with you prior to refilling your medication.   *Please Note: Medication refills are assigned a new Rx number when refilled electronically. Your pharmacy may indicate that no refills were authorized even though a new prescription for the same medication is available at the pharmacy. Please request the medicine by name with the pharmacy before contacting your provider for a refill.        Your To Do List     Future Labs/Procedures Complete By Expires    FREE THYROXINE  As directed 2/14/2018    T3 FREE  As directed 2/14/2018    THYROTROPIN RECEP AB  As directed 8/14/2018    TSH  As directed 2/14/2018         [employer] Access Code: Activation code not generated  Current [employer] Status: Active

## 2017-08-15 NOTE — PROGRESS NOTES
Chief Complaint   Patient presents with   • Thyrotoxicosis               CHIEF COMPLAINT:   Endocrine consultation requested by Dr. Pia Dominguez for this 67 year old lady with recently discovered thyrotoxicosis.      PRESENT ILLNESS:     The patient has had multiple symptoms over the past several months.  Whether these are all related to thyrotoxicosis or not is to be determined.  She has had a sense of fatigue and weakness.  Thighs and upper arms in particular predominating.  She has had weight gain, however, at least no loss while she has been dieting and presumably evolving thyrotoxic.  She has had hot flashes.  Her heart rate has been accelerated around .  She does not have palpitations or heart irregularity.  She did have a tremor until she started taking methimazole.  She has had a sense of irritability, hair loss and hot flashes.  She has also had some degree of constipation, small firm stools.  Also her stool is somewhat frequent.    On July 14, her TSH was found to be completely suppressed with a free T4 slightly elevated at 1.5 (0.5-1.40.  She was seen by Dr. Dominguez and methimazole 5 mg was started.  Also she was taking Losartan for longstanding hypertension.    That was discontinued and atenolol 50 mg each evening substituted.  She has felt about the same since taking methimazole.      She has not noticed any enlargement or symptoms in the area of her thyroid gland.  She has also not noticed any eye symptoms, pressure or exophthalmos.  No diplopia.  No family history of Grave’s disease, although one nephew has hypothyroidism.      We discussed the condition probably as Grave’s disease but we don’t have the antibodies to confirm yet.  Her TPO antibodies are negative and thyroglobulin antibodies are negative.      I also described the possible side effects of methimazole including blood dyscrasia such as agranulocytosis or toxic hepatitis.  I gave her the general warning that at the first  sign of any illness whatsoever whether or not she feels she knows the cause or not, she should discontinue methimazole and contact Dr. Dominguez or myself for evaluation prior to resuming.      On examination, I don’t think she has Grave’s eye disease.  I cannot feel her thyroid.  It is small and I think it is tucked down almost substernal.  I will measure her thyrotropin receptor antibody.  If that is positive, I don’t think we need to do an ultrasound or scan just yet.  If her antibodies are negative, then I will do a thyroid ultrasound looking for nodules.      I told her she could take her methimazole 10 mg once a day.  I would like to see her in about three weeks, update her lab and review.      ROS:  Fatigue  Scalp hair loss and dry  Weight gain and no loss while dieting  Tinnitus  Hot flashes  Elevated heart rate  Skin rash on forearms, chronic  Proximal muscle weakness  Constipation  Irritability      Allergies: None    Current medicines including changes today:  Current Outpatient Prescriptions   Medication Sig Dispense Refill   • SHARK CARTILAGE PO Take  by mouth.     • methimazole (TAPAZOLE) 5 MG Tab Take 1 Tab by mouth 3 times a day. 90 Tab 2   • atenolol (TENORMIN) 50 MG Tab Take 1 Tab by mouth every day. 30 Tab 3   • aspirin (ASA) 81 MG Chew Tab chewable tablet Take 81 mg by mouth every day.     • ascorbic acid (ASCORBIC ACID) 500 MG Tab Take 500 mg by mouth every day.     • vitamin D (CHOLECALCIFEROL) 1000 UNIT Tab Take 1,000 Units by mouth every day.     • Omega 3 1000 MG Cap Take  by mouth.     • acetaminophen (TYLENOL) 500 MG Tab Take 500-1,000 mg by mouth every 6 hours as needed.     • losartan (COZAAR) 25 MG Tab Take 1 Tab by mouth every day. 90 Tab 3     No current facility-administered medications for this visit.        Past Medical History   Diagnosis Date   • Hypertension    • Hyperlipidemia    • Thyroid disease       Family history         No family history of thyrotoxicosis or other thyroid  "disease    Social history       Patient is . Retired      Does not smoke cigarettes or use recreational or illicit drugs.       PHYSICAL EXAM:    /92 mmHg  Pulse 75  Ht 1.676 m (5' 5.98\")  Wt 95.89 kg (211 lb 6.4 oz)  BMI 34.14 kg/m2  SpO2 96%    Gen.  Obese but otherwise appears healthy. Does not appear to be overtly thyrotoxic. Appears more lethargic than thyrotoxic    Skin   appropriate for sex and age    HEENT  no sign of Graves all Papil mop at the    Neck   thyroid gland about normal size without palpable nodules.    Heart  regular    Extremities  no edema, no pretibial myxedema    Neuro  gait and station normal, no tremor    Psych  appropriate,    ASSESSMENT AND RECOMMENDATIONS    1. Hyperthyroidism          Type not specified  - FREE THYROXINE; Future  - T3 FREE; Future  - TSH; Future  - THYROTROPIN RECEP AB; Future    2. Obesity (BMI 30-39.9)        DISPOSITION: Return in about 1 month (around 9/14/2017).       Denny Ireland M.D.    Copies to: Pia Dominguez M.D. [phone]  "

## 2017-08-22 ENCOUNTER — APPOINTMENT (OUTPATIENT)
Dept: MEDICAL GROUP | Facility: LAB | Age: 67
End: 2017-08-22
Payer: MEDICARE

## 2017-08-28 ENCOUNTER — HOSPITAL ENCOUNTER (OUTPATIENT)
Dept: LAB | Facility: MEDICAL CENTER | Age: 67
End: 2017-08-28
Attending: INTERNAL MEDICINE
Payer: MEDICARE

## 2017-08-28 DIAGNOSIS — E05.90 HYPERTHYROIDISM: ICD-10-CM

## 2017-08-28 LAB
T3FREE SERPL-MCNC: 2.73 PG/ML (ref 2.4–4.2)
T4 FREE SERPL-MCNC: 0.77 NG/DL (ref 0.53–1.43)
TSH SERPL DL<=0.005 MIU/L-ACNC: 0.03 UIU/ML (ref 0.3–3.7)

## 2017-08-28 PROCEDURE — 84481 FREE ASSAY (FT-3): CPT

## 2017-08-28 PROCEDURE — 84443 ASSAY THYROID STIM HORMONE: CPT

## 2017-08-28 PROCEDURE — 84439 ASSAY OF FREE THYROXINE: CPT

## 2017-08-28 PROCEDURE — 83520 IMMUNOASSAY QUANT NOS NONAB: CPT

## 2017-08-28 PROCEDURE — 36415 COLL VENOUS BLD VENIPUNCTURE: CPT

## 2017-08-30 LAB — TSH RECEP AB SER-ACNC: 6.29 IU/L

## 2017-09-12 ENCOUNTER — OFFICE VISIT (OUTPATIENT)
Dept: ENDOCRINOLOGY | Facility: MEDICAL CENTER | Age: 67
End: 2017-09-12
Payer: MEDICARE

## 2017-09-12 DIAGNOSIS — E05.90 HYPERTHYROIDISM: Primary | ICD-10-CM

## 2017-09-12 PROCEDURE — 99213 OFFICE O/P EST LOW 20 MIN: CPT | Performed by: INTERNAL MEDICINE

## 2017-09-12 RX ORDER — METHIMAZOLE 5 MG/1
5 TABLET ORAL 2 TIMES DAILY
Qty: 30 TAB | Refills: 3
Start: 2017-09-12 | End: 2018-01-16

## 2017-09-12 RX ORDER — METHIMAZOLE 5 MG/1
5 TABLET ORAL 2 TIMES DAILY
Qty: 30 TAB | Refills: 3 | Status: CANCELLED
Start: 2017-09-12

## 2017-09-12 RX ORDER — METHIMAZOLE 5 MG/1
5 TABLET ORAL DAILY
Qty: 30 TAB | Refills: 3
Start: 2017-09-12 | End: 2017-09-12

## 2017-09-12 NOTE — PROGRESS NOTES
Chief Complaint   Patient presents with   • Thyrotoxicosis     Graves' disease        HPI:    Thyrotoxicosis        Patient seems to have a response to methimazole. Her free T4 has dropped quickly down to low normal at 0.7. Free T3 also low-normal at 2.7.          We will reduce her methimazole to 5 mg twice daily.            Her symptoms are difficult to interpret and not necessarily related completely to thyrotoxicosis.            She does have Graves' disease with positive thyrotropin receptor antibody.    ROS:   All other systems reported as negative or unchanged since last exam      Allergies: No Known Allergies    Current medicines including changes today:  Current Outpatient Prescriptions   Medication Sig Dispense Refill   • MAGNESIUM PO Take  by mouth.     • DIGESTIVE ENZYMES PO Take  by mouth.     • methimazole (TAPAZOLE) 5 MG Tab Take 1 Tab by mouth 3 times a day. 90 Tab 2   • atenolol (TENORMIN) 50 MG Tab Take 1 Tab by mouth every day. 30 Tab 3   • aspirin (ASA) 81 MG Chew Tab chewable tablet Take 81 mg by mouth every day.     • ascorbic acid (ASCORBIC ACID) 500 MG Tab Take 500 mg by mouth every day.     • vitamin D (CHOLECALCIFEROL) 1000 UNIT Tab Take 1,000 Units by mouth every day.     • acetaminophen (TYLENOL) 500 MG Tab Take 500-1,000 mg by mouth every 6 hours as needed.     • SHARK CARTILAGE PO Take  by mouth.     • Omega 3 1000 MG Cap Take  by mouth.     • losartan (COZAAR) 25 MG Tab Take 1 Tab by mouth every day. 90 Tab 3     No current facility-administered medications for this visit.         Past Medical History:   Diagnosis Date   • Hyperlipidemia    • Hypertension    • Hyperthyroidism     Graves' disease / thyrotropin receptor antibody = 6.2       PHYSICAL EXAM:    Weight 213 pounds     height 5 feet 6 inches  BP   126/88    Pulse 63    O2 sat 97%    Gen.   Overweight but otherwise appears healthy. Does not appear overtly thyrotoxic    Skin   appropriate for sex and age    HEENT  no eye signs  of Graves' ophthalmopathy    Neck   thyroid gland is not enlarged. About normal size and difficult to palpate. No palpable nodules. Nontender    Heart  regular    Extremities  no edema    Neuro  gait and station normal, no tremor    Psych  appropriate, calm, pleasant      ASSESSMENT AND RECOMMENDATIONS    1. Hyperthyroidism, Graves' disease               Reduce methimazole to 5 mg twice daily               Positive thyrotropin receptor antibody but without ophthalmopathy               Quick response to methimazole and the relatively small gland might indicate a chance for remission with medical management  - FREE THYROXINE; Future  - T3 FREE; Future  - TSH; Future      DISPOSITION: Return in about 1 month (around 10/12/2017).       Denny Ireland M.D.    Copies to: Pia Dominguez M.D. [phone]

## 2017-09-20 ENCOUNTER — OFFICE VISIT (OUTPATIENT)
Dept: MEDICAL GROUP | Facility: LAB | Age: 67
End: 2017-09-20
Payer: MEDICARE

## 2017-09-20 VITALS
OXYGEN SATURATION: 98 % | HEART RATE: 60 BPM | DIASTOLIC BLOOD PRESSURE: 88 MMHG | BODY MASS INDEX: 34.39 KG/M2 | RESPIRATION RATE: 16 BRPM | TEMPERATURE: 98.1 F | SYSTOLIC BLOOD PRESSURE: 130 MMHG | WEIGHT: 214 LBS | HEIGHT: 66 IN

## 2017-09-20 DIAGNOSIS — I10 ESSENTIAL HYPERTENSION: ICD-10-CM

## 2017-09-20 DIAGNOSIS — E66.9 OBESITY (BMI 30-39.9): ICD-10-CM

## 2017-09-20 DIAGNOSIS — E05.90 HYPERTHYROIDISM: ICD-10-CM

## 2017-09-20 PROCEDURE — 99214 OFFICE O/P EST MOD 30 MIN: CPT | Performed by: FAMILY MEDICINE

## 2017-09-20 RX ORDER — LOSARTAN POTASSIUM 25 MG/1
25 TABLET ORAL DAILY
Qty: 90 TAB | Refills: 3 | Status: SHIPPED | OUTPATIENT
Start: 2017-09-20 | End: 2017-09-25 | Stop reason: SDUPTHER

## 2017-09-20 NOTE — PROGRESS NOTES
Subjective:   Elissa Garrett is a 67 y.o. female here today for   Chief Complaint   Patient presents with   • Follow-Up     thyroid check       1. Hyperthyroidism  Chronic  TRaB positive  Likely Graves   Improved symptoms with methimazoleWhich she is weaning from with Dr. Ireland  Her fatigue has improved, but she is still feeling some fatigue. She is on atenolol and her heart rate is 60 today. She states that her mood has significantly improved since being on this medication. Her most recent thyroid labs have improved  Results for ELISSA GARRETT (MRN [id number]) as of 9/20/2017 16:24   Ref. Range 8/28/2017 08:35   TSH Latest Ref Range: 0.300 - 3.700 uIU/mL 0.030 (L)   Free T-4 Latest Ref Range: 0.53 - 1.43 ng/dL 0.77     2. Essential hypertension  This is chronic. Generally elevated in doctors office. Monitoring BP at home. her home BP readings are on average 130/80. Currently taking atenolol 50 mg daily as directed. Also taking baby aspirin. Denies lightheadedness, vision changes, headache, palpitations, chest pain, or leg swelling.    3. Obesity (BMI 30-39.9)  Chronic  Exercising, has had difficulty with weight loss for the last few years. Not eating well currently. Not exercising vigorously but she has started an exercise routine with her elliptical    Current medicines (including changes today)  Current Outpatient Prescriptions   Medication Sig Dispense Refill   • losartan (COZAAR) 25 MG Tab Take 1 Tab by mouth every day. 90 Tab 3   • MAGNESIUM PO Take  by mouth.     • DIGESTIVE ENZYMES PO Take  by mouth.     • methimazole (TAPAZOLE) 5 MG Tab Take 1 Tab by mouth 2 Times a Day. 30 Tab 3   • aspirin (ASA) 81 MG Chew Tab chewable tablet Take 81 mg by mouth every day.     • ascorbic acid (ASCORBIC ACID) 500 MG Tab Take 500 mg by mouth every day.     • vitamin D (CHOLECALCIFEROL) 1000 UNIT Tab Take 1,000 Units by mouth every day.     • acetaminophen (TYLENOL) 500 MG Tab Take 500-1,000 mg by mouth every 6 hours as needed.    "    No current facility-administered medications for this visit.      She  has a past medical history of Hyperlipidemia; Hypertension; and Hyperthyroidism. She also has no past medical history of Encounter for long-term (current) use of other medications.    ROS   No fevers  No bowel changes  No LE edema       Objective:     Blood pressure 130/88, pulse 60, temperature 36.7 °C (98.1 °F), resp. rate 16, height 1.676 m (5' 6\"), weight 97.1 kg (214 lb), SpO2 98 %. Body mass index is 34.54 kg/m².   Physical Exam:  Constitutional: Alert, no distress.  Skin: Warm, dry, good turgor, no rashes in visible areas.  Eye: Equal, round and reactive, conjunctiva clear, lids normal.  ENMT: Lips without lesions, good dentition, oropharynx clear.  Neck: Trachea midline, no masses, no thyromegaly. No cervical or supraclavicular lymphadenopathy  Respiratory: Unlabored respiratory effort, lungs clear to auscultation, no wheezes, no ronchi.  Cardiovascular: Normal S1, S2, RRR, no murmur, no edema.  Abdomen: Soft, non-tender, no masses, no hepatosplenomegaly.  Psych: Alert and oriented x3, normal affect and mood.        Assessment and Plan:   The following treatment plan was discussed    1. Hyperthyroidism  Chronic, improving, likely Graves given her antibiotic testing.  Weaning from methimazole  We will stop her beta blocker    2. Essential hypertension  Chronic, improved. We are going to stop her beta blocker and get her back on her angiotensin receptor blocker given her improvement in her thyroid. We will wean off of the beta blocker while starting her losartan. Return for recheck in 6 weeks. Home blood pressure monitoring during this time  - losartan (COZAAR) 25 MG Tab; Take 1 Tab by mouth every day.  Dispense: 90 Tab; Refill: 3    3. Obesity (BMI 30-39.9)  Chronic, not controlled. Discussed importance of diet and exercise. Counseled today        Followup: Return in about 6 weeks (around 11/1/2017) for thyroid.       This note was " created using voice recognition software. I have made every reasonable attempt to correct errors, however, I do anticipate some grammatical errors.

## 2017-09-25 DIAGNOSIS — I10 ESSENTIAL HYPERTENSION: ICD-10-CM

## 2017-09-25 RX ORDER — LOSARTAN POTASSIUM 50 MG/1
50 TABLET ORAL DAILY
Qty: 90 TAB | Refills: 3
Start: 2017-09-25 | End: 2018-07-26 | Stop reason: SDUPTHER

## 2017-10-04 ENCOUNTER — NON-PROVIDER VISIT (OUTPATIENT)
Dept: MEDICAL GROUP | Facility: LAB | Age: 67
End: 2017-10-04
Payer: MEDICARE

## 2017-10-04 DIAGNOSIS — Z23 NEED FOR INFLUENZA VACCINATION: ICD-10-CM

## 2017-10-04 PROCEDURE — 90686 IIV4 VACC NO PRSV 0.5 ML IM: CPT | Performed by: FAMILY MEDICINE

## 2017-10-04 PROCEDURE — G0008 ADMIN INFLUENZA VIRUS VAC: HCPCS | Performed by: FAMILY MEDICINE

## 2017-10-04 NOTE — NON-PROVIDER
"Jasiel Garrett is a 67 y.o. female here for a non-provider visit for:   FLU    Reason for immunization: Annual Flu Vaccine  Immunization records indicate need for vaccine: Yes, confirmed with Epic  Minimum interval has been met for this vaccine: Yes  ABN completed: Yes    Order and dose verified by: Dr. Dominguez/lashaun  VIS Dated  8-7-15 was given to patient: Yes  All IAC Questionnaire questions were answered \"No.\"    Patient tolerated injection and no adverse effects were observed or reported: Yes    Pt scheduled for next dose in series: Not Indicated    "

## 2017-10-12 ENCOUNTER — HOSPITAL ENCOUNTER (OUTPATIENT)
Dept: LAB | Facility: MEDICAL CENTER | Age: 67
End: 2017-10-12
Attending: INTERNAL MEDICINE
Payer: MEDICARE

## 2017-10-12 DIAGNOSIS — E05.90 HYPERTHYROIDISM: ICD-10-CM

## 2017-10-12 LAB
T3FREE SERPL-MCNC: 3.43 PG/ML (ref 2.4–4.2)
T4 FREE SERPL-MCNC: 0.83 NG/DL (ref 0.53–1.43)
TSH SERPL DL<=0.005 MIU/L-ACNC: 2.91 UIU/ML (ref 0.3–3.7)

## 2017-10-12 PROCEDURE — 84439 ASSAY OF FREE THYROXINE: CPT

## 2017-10-12 PROCEDURE — 84443 ASSAY THYROID STIM HORMONE: CPT

## 2017-10-12 PROCEDURE — 36415 COLL VENOUS BLD VENIPUNCTURE: CPT

## 2017-10-12 PROCEDURE — 84481 FREE ASSAY (FT-3): CPT

## 2017-10-16 ENCOUNTER — APPOINTMENT (OUTPATIENT)
Dept: ENDOCRINOLOGY | Facility: MEDICAL CENTER | Age: 67
End: 2017-10-16
Payer: MEDICARE

## 2017-10-30 ENCOUNTER — OFFICE VISIT (OUTPATIENT)
Dept: ENDOCRINOLOGY | Facility: MEDICAL CENTER | Age: 67
End: 2017-10-30
Payer: MEDICARE

## 2017-10-30 VITALS
WEIGHT: 217.2 LBS | HEART RATE: 104 BPM | HEIGHT: 66 IN | OXYGEN SATURATION: 94 % | BODY MASS INDEX: 34.91 KG/M2 | DIASTOLIC BLOOD PRESSURE: 88 MMHG | SYSTOLIC BLOOD PRESSURE: 146 MMHG

## 2017-10-30 DIAGNOSIS — E05.90 HYPERTHYROIDISM: ICD-10-CM

## 2017-10-30 DIAGNOSIS — I10 ESSENTIAL HYPERTENSION: Primary | ICD-10-CM

## 2017-10-30 PROCEDURE — 99213 OFFICE O/P EST LOW 20 MIN: CPT | Performed by: INTERNAL MEDICINE

## 2017-10-31 NOTE — PROGRESS NOTES
Chief Complaint   Patient presents with   • Hypothyroidism        HPI:         1. Thyrotoxicosis, Grave’s disease.    The patient is feeling bloated and sluggish.  I think her thyroid level is probably responsible for that, but she is also making some changes in blood pressure medicine going from atenolol to losartan.  Her current TSH has come up to 2.9 and free T4 low normal at 0.8.  Free T3 mid range at 3.4 taking methimazole 5 mg per day.  She is taking a laxative to keep her bowels going.  Her weight is a problem and she feels as though she is retaining fluid although I do not demonstrate any edema.  Her rings are not tight, for example, and no pitting edema around the ankles.      Her thyroid gland is small so she could go into remission just with methimazole.  She is going to cut her dose down to 2.5 mg per day for one month and then we will recheck and I will also recheck her thyrotropin receptor antibody to see if that titer might be going down also which would be a good indicator that she could go into remission.    In the meantime she is going to continue to follow with Dr. Dominguez who plans to see her November 9.  I gave her a lab order for a BMP to do a day or two before that appointment for Dr. Dominguez to look at and I will see her towards the end of this month.     ROS:  All other systems reported as negative or unchanged since last exam      Allergies: No Known Allergies    Current medicines including changes today:  Current Outpatient Prescriptions   Medication Sig Dispense Refill   • non-formulary med Vibrant Detox Tablet - 3 tabs at night     • losartan (COZAAR) 50 MG Tab Take 1 Tab by mouth every day. 90 Tab 3   • DIGESTIVE ENZYMES PO Take  by mouth.     • methimazole (TAPAZOLE) 5 MG Tab Take 1 Tab by mouth 2 Times a Day. 30 Tab 3   • aspirin (ASA) 81 MG Chew Tab chewable tablet Take 81 mg by mouth every day.     • ascorbic acid (ASCORBIC ACID) 500 MG Tab Take 500 mg by mouth every day.     •  "vitamin D (CHOLECALCIFEROL) 1000 UNIT Tab Take 1,000 Units by mouth every day.     • acetaminophen (TYLENOL) 500 MG Tab Take 500-1,000 mg by mouth every 6 hours as needed.     • MAGNESIUM PO Take  by mouth.       No current facility-administered medications for this visit.         Past Medical History:   Diagnosis Date   • Hyperlipidemia    • Hypertension    • Hyperthyroidism     Graves' disease / thyrotropin receptor antibody = 6.2       PHYSICAL EXAM:    /88   Pulse (!) 104   Ht 1.676 m (5' 6\")   Wt 98.5 kg (217 lb 3.2 oz)   SpO2 94%   BMI 35.06 kg/m²      Gen.  Overweight but otherwise appears healthy     Skin   appropriate for sex and age    HEENT  unremarkable    Neck   thyroid gland mildly enlarged but difficult to feel.    Heart  regular    Extremities  no edema    Neuro  gait and station normal    Psych  appropriate    ASSESSMENT AND RECOMMENDATIONS    1. Hyperthyroidism           Responding well to methimazole. Decrease dose to 2.5 mg daily  - FREE THYROXINE; Future  - T3 FREE; Future  - TSH; Future  - THYROTROPIN RECEP AB; Future    2. Essential hypertension          Question some fluid retention but without eating edema  - BASIC METABOLIC PANEL; Future      DISPOSITION: Return in about 6 weeks       Denny Ireland M.D.    Copies to: Pia Dominguez M.D. [phone]  "

## 2017-11-06 ENCOUNTER — HOSPITAL ENCOUNTER (OUTPATIENT)
Dept: LAB | Facility: MEDICAL CENTER | Age: 67
End: 2017-11-06
Attending: INTERNAL MEDICINE
Payer: MEDICARE

## 2017-11-06 DIAGNOSIS — I10 ESSENTIAL HYPERTENSION: ICD-10-CM

## 2017-11-06 LAB
ANION GAP SERPL CALC-SCNC: 8 MMOL/L (ref 0–11.9)
BUN SERPL-MCNC: 16 MG/DL (ref 8–22)
CALCIUM SERPL-MCNC: 9.4 MG/DL (ref 8.5–10.5)
CHLORIDE SERPL-SCNC: 105 MMOL/L (ref 96–112)
CO2 SERPL-SCNC: 28 MMOL/L (ref 20–33)
CREAT SERPL-MCNC: 0.84 MG/DL (ref 0.5–1.4)
GFR SERPL CREATININE-BSD FRML MDRD: >60 ML/MIN/1.73 M 2
GLUCOSE SERPL-MCNC: 132 MG/DL (ref 65–99)
POTASSIUM SERPL-SCNC: 3.9 MMOL/L (ref 3.6–5.5)
SODIUM SERPL-SCNC: 141 MMOL/L (ref 135–145)

## 2017-11-06 PROCEDURE — 36415 COLL VENOUS BLD VENIPUNCTURE: CPT

## 2017-11-06 PROCEDURE — 80048 BASIC METABOLIC PNL TOTAL CA: CPT

## 2017-12-01 ENCOUNTER — HOSPITAL ENCOUNTER (OUTPATIENT)
Dept: LAB | Facility: MEDICAL CENTER | Age: 67
End: 2017-12-01
Attending: INTERNAL MEDICINE
Payer: MEDICARE

## 2017-12-01 DIAGNOSIS — E05.90 HYPERTHYROIDISM: ICD-10-CM

## 2017-12-01 LAB
T3FREE SERPL-MCNC: 3.7 PG/ML (ref 2.4–4.2)
T4 FREE SERPL-MCNC: 1.03 NG/DL (ref 0.53–1.43)
TSH SERPL DL<=0.005 MIU/L-ACNC: 0.77 UIU/ML (ref 0.3–3.7)

## 2017-12-01 PROCEDURE — 36415 COLL VENOUS BLD VENIPUNCTURE: CPT

## 2017-12-01 PROCEDURE — 84439 ASSAY OF FREE THYROXINE: CPT

## 2017-12-01 PROCEDURE — 84481 FREE ASSAY (FT-3): CPT

## 2017-12-01 PROCEDURE — 84443 ASSAY THYROID STIM HORMONE: CPT

## 2017-12-01 PROCEDURE — 83520 IMMUNOASSAY QUANT NOS NONAB: CPT

## 2017-12-03 LAB — TSH RECEP AB SER-ACNC: 5.69 IU/L

## 2017-12-05 ENCOUNTER — OFFICE VISIT (OUTPATIENT)
Dept: ENDOCRINOLOGY | Facility: MEDICAL CENTER | Age: 67
End: 2017-12-05
Payer: MEDICARE

## 2017-12-05 VITALS
WEIGHT: 218 LBS | HEIGHT: 66 IN | DIASTOLIC BLOOD PRESSURE: 100 MMHG | SYSTOLIC BLOOD PRESSURE: 148 MMHG | HEART RATE: 90 BPM | BODY MASS INDEX: 35.03 KG/M2 | OXYGEN SATURATION: 94 %

## 2017-12-05 DIAGNOSIS — E05.90 HYPERTHYROIDISM: ICD-10-CM

## 2017-12-05 PROCEDURE — 99213 OFFICE O/P EST LOW 20 MIN: CPT | Performed by: INTERNAL MEDICINE

## 2017-12-06 NOTE — PROGRESS NOTES
"     HPI:    Thyrotoxicosis         Getting some improvement now. TSH is 0.7 and free T4 1 0.0 and free T3 high normal at 3.7. Taking methimazole 2.5 mg daily.          Getting more energy and strength and becoming more active.          Thyroid troponin receptor antibody about the same. Originally 6.2 and now 5.6 ( < 1.7)           Continue same dose and return in 6 weeks    ROS:   All other systems reported as negative or unchanged since last exam      Allergies: No Known Allergies    Current medicines including changes today:  Current Outpatient Prescriptions   Medication Sig Dispense Refill   • losartan (COZAAR) 50 MG Tab Take 1 Tab by mouth every day. 90 Tab 3   • DIGESTIVE ENZYMES PO Take  by mouth.     • methimazole (TAPAZOLE) 5 MG Tab Take 1 Tab by mouth 2 Times a Day. (Patient taking differently: Take 2.5 mg by mouth 2 Times a Day.) 30 Tab 3   • aspirin (ASA) 81 MG Chew Tab chewable tablet Take 81 mg by mouth every day.     • ascorbic acid (ASCORBIC ACID) 500 MG Tab Take 500 mg by mouth every day.     • vitamin D (CHOLECALCIFEROL) 1000 UNIT Tab Take 1,000 Units by mouth every day.     • acetaminophen (TYLENOL) 500 MG Tab Take 500-1,000 mg by mouth every 6 hours as needed.     • non-formulary med Vibrant Detox Tablet - 3 tabs at night     • MAGNESIUM PO Take  by mouth.       No current facility-administered medications for this visit.         Past Medical History:   Diagnosis Date   • Hyperlipidemia    • Hypertension    • Hyperthyroidism     Graves' disease / thyrotropin receptor antibody = 6.2       PHYSICAL EXAM:    /100   Pulse 90   Ht 1.676 m (5' 6\")   Wt 98.9 kg (218 lb)   SpO2 94%   BMI 35.19 kg/m²      Gen.   appears healthy     Skin   appropriate for sex and age    HEENT  no eye changes of Graves' disease all the mall but they    Neck   thyroid gland is relatively small. No palpable nodules    Heart  regular    Extremities  no edema    Neuro  gait and station normal, no tremor    Psych  " appropriate, good spirits      ASSESSMENT AND RECOMMENDATIONS    1. Hyperthyroidism, Graves' disease               Slowly responding to methimazole 2.5 mg daily               No inclination to do I-131 therapy or thyroidectomy. We are hoping for a remission with medical management.  - FREE THYROXINE; Future  - T3 FREE; Future  - TSH; Future      DISPOSITION: Return in 6 weeks       Denny Ireland M.D.    Copies to: Pia Dominguez M.D. [phone]

## 2018-01-12 ENCOUNTER — HOSPITAL ENCOUNTER (OUTPATIENT)
Dept: LAB | Facility: MEDICAL CENTER | Age: 68
End: 2018-01-12
Attending: INTERNAL MEDICINE
Payer: MEDICARE

## 2018-01-12 DIAGNOSIS — E05.90 HYPERTHYROIDISM: ICD-10-CM

## 2018-01-12 LAB
T3FREE SERPL-MCNC: 3.55 PG/ML (ref 2.4–4.2)
T4 FREE SERPL-MCNC: 0.92 NG/DL (ref 0.53–1.43)
TSH SERPL DL<=0.005 MIU/L-ACNC: 0.76 UIU/ML (ref 0.38–5.33)

## 2018-01-12 PROCEDURE — 84439 ASSAY OF FREE THYROXINE: CPT

## 2018-01-12 PROCEDURE — 84481 FREE ASSAY (FT-3): CPT

## 2018-01-12 PROCEDURE — 84443 ASSAY THYROID STIM HORMONE: CPT

## 2018-01-12 PROCEDURE — 36415 COLL VENOUS BLD VENIPUNCTURE: CPT

## 2018-01-16 ENCOUNTER — TELEPHONE (OUTPATIENT)
Dept: ENDOCRINOLOGY | Facility: MEDICAL CENTER | Age: 68
End: 2018-01-16

## 2018-01-16 ENCOUNTER — HOSPITAL ENCOUNTER (OUTPATIENT)
Dept: RADIOLOGY | Facility: MEDICAL CENTER | Age: 68
End: 2018-01-16
Attending: INTERNAL MEDICINE
Payer: MEDICARE

## 2018-01-16 ENCOUNTER — OFFICE VISIT (OUTPATIENT)
Dept: ENDOCRINOLOGY | Facility: MEDICAL CENTER | Age: 68
End: 2018-01-16
Payer: MEDICARE

## 2018-01-16 VITALS
HEART RATE: 96 BPM | HEIGHT: 66 IN | BODY MASS INDEX: 35.2 KG/M2 | OXYGEN SATURATION: 93 % | DIASTOLIC BLOOD PRESSURE: 100 MMHG | WEIGHT: 219 LBS | SYSTOLIC BLOOD PRESSURE: 160 MMHG

## 2018-01-16 DIAGNOSIS — K59.01 SLOW TRANSIT CONSTIPATION: ICD-10-CM

## 2018-01-16 DIAGNOSIS — E66.9 OBESITY (BMI 30-39.9): ICD-10-CM

## 2018-01-16 DIAGNOSIS — E05.90 HYPERTHYROIDISM: Primary | ICD-10-CM

## 2018-01-16 PROCEDURE — 74019 RADEX ABDOMEN 2 VIEWS: CPT

## 2018-01-16 PROCEDURE — 99214 OFFICE O/P EST MOD 30 MIN: CPT | Performed by: INTERNAL MEDICINE

## 2018-01-16 RX ORDER — METHIMAZOLE 5 MG/1
2.5 TABLET ORAL
Qty: 30 TAB | Refills: 3
Start: 2018-01-16 | End: 2019-03-20 | Stop reason: SDUPTHER

## 2018-01-16 NOTE — PROGRESS NOTES
Chief Complaint   Patient presents with   • Thyrotoxicosis        HPI:         1. Grave’s disease/thyrotoxicosis.    The patient’s situation is becoming somewhat complicated.  She is taking low dose methimazole, i.e., 2.5 mg daily.  Her heart rate continues to be somewhat elevated apparently at 96.  On the other hand she seems to be heading towards hypothyroidism.  Her TSH is stable at 0.7.  Free thyroxin low normal at 0.9 and free T3 upper normal at 3.5.  Difficult to know clinically except she is not overtly thyrotoxic.  Nonetheless she complains of inability to control her weight.  Really what she is complaining about is abdominal distention and “bloat”.  She actually has not gained weight over several months.  In October, her weight was 217 and currently it is 219.  She is requesting a diuretic to relieve this bloat but she really does not have peripheral edema so I am reluctant to give her a diuretic.  It is just a sensation she has.  Stools are mostly small pebble like stools which she insists that she eats bulk and takes a stool softener or something along those lines to facilitate her bowel movements.  On my examination of her abdomen, it is very difficult to know because she just has a large abdomen that is somewhat tense on palpation but I wouldn’t say it is pathologically distended.  She does not have tenderness on palpation but she is uncomfortable in the abdomen area.  She wonders if perhaps she is over treated and that she may be hypothyroid.  She certainly isn’t by the numbers but I would not be opposed to reducing her methimazole to let her thyroid levels come up a little bit.  So we are going to decrease methimazole to 2.5 mg every other day.  Actually for all I know, she could be in remission but I doubt it because she still has positive thyrotropin receptor antibody.    She also worries about Tylenol.  She takes about 1500 mg a day for her various aches and pains and she worries about that  "toxicity.      We are getting out of my area now of practice, but I think it would be reasonable for screening to do a chemistry panel to look at renal function and liver tests.  Also I am going to do a two-way abdomen just to see what the bowel gas pattern is to make sure she hasn’t got a low grade obstruction or on the other hand, full of stool and being obstipated.  I will see her again in three weeks and we will recheck her thyroid levels to see how she is feeling if her thyroid levels do come up.  It might also be interesting to see if her thyroid levels change at all as we wean her off the methimazole.      Depending on what her chem panel and plain x-rays show, I may have to enlist the help of Dr. Dominguez to look into this further.     ROS:  All other systems reported as negative except as stated in history of present illness      Allergies: No Known Allergies    Current medicines including changes today:  Current Outpatient Prescriptions   Medication Sig Dispense Refill   • methimazole (TAPAZOLE) 5 MG Tab Take 0.5 Tabs by mouth every 48 hours. 30 Tab 3   • losartan (COZAAR) 50 MG Tab Take 1 Tab by mouth every day. 90 Tab 3   • DIGESTIVE ENZYMES PO Take  by mouth.     • aspirin (ASA) 81 MG Chew Tab chewable tablet Take 81 mg by mouth every day.     • ascorbic acid (ASCORBIC ACID) 500 MG Tab Take 500 mg by mouth every day.     • vitamin D (CHOLECALCIFEROL) 1000 UNIT Tab Take 1,000 Units by mouth every day.     • acetaminophen (TYLENOL) 500 MG Tab Take 500-1,000 mg by mouth every 6 hours as needed.     • non-formulary med Vibrant Detox Tablet - 3 tabs at night     • MAGNESIUM PO Take  by mouth.       No current facility-administered medications for this visit.         Past Medical History:   Diagnosis Date   • Hyperlipidemia    • Hypertension    • Hyperthyroidism     Graves' disease / thyrotropin receptor antibody = 6.2       PHYSICAL EXAM:    /100   Pulse 96   Ht 1.676 m (5' 6\")   Wt 99.3 kg (219 " "lb)   SpO2 93%   BMI 35.35 kg/m²      Gen.   Obese complaining of abdominal \"bloat \"     Skin   appropriate for sex and age    HEENT   no eye signs of Graves' disease    Neck   thyroid gland is relatively small and partly substernal. Difficult to palpate.    Heart  regular    Abdomen   abdomen is distended but difficult to tell from obesity. It is somewhat tense on palpation. I don't think she has ascites. There is no specific area of tenderness.    Extremities  no edema    Neuro  gait and station normal, no tremor    Psych  appropriate    ASSESSMENT AND RECOMMENDATIONS    1. Hyperthyroidism, Graves' disease               Euthyroid by blood testing is difficult to tell clinically               Decrease methimazole to 2.5 mg every other day she  - FREE THYROXINE; Future  - T3 FREE; Future  - TSH; Future  - methimazole (TAPAZOLE) 5 MG Tab; Take 0.5 Tabs by mouth every 48 hours.  Dispense: 30 Tab; Refill: 3  - CBC WITH DIFFERENTIAL; Future    2. Slow transit constipation by history    - COMP METABOLIC PANEL; Future  - [id number] VIEWS; Future    3. Obesity (BMI 30-39.9)             Difficult to assess abdominal distention      DISPOSITION:  Return in 3 weeks       Denny Ireland M.D.    Copies to: Pia Dominguez M.D. [phone]  "

## 2018-01-17 NOTE — TELEPHONE ENCOUNTER
Telephone conversation with patient. Abdominal x-ray reveals abundant stool in her colon all the way through. Her colon is not distended. She describes having bowel movements of small balls of stool and it looks like she has plenty of those still in her.    I've recommended a stool softener loss of hydration and using MiraLAX according to directions. She is familiar with that and we'll give that a try.    Denny Ireland M.D.

## 2018-02-02 ENCOUNTER — HOSPITAL ENCOUNTER (OUTPATIENT)
Dept: LAB | Facility: MEDICAL CENTER | Age: 68
End: 2018-02-02
Attending: INTERNAL MEDICINE
Payer: MEDICARE

## 2018-02-02 DIAGNOSIS — E05.90 HYPERTHYROIDISM: ICD-10-CM

## 2018-02-02 DIAGNOSIS — E66.9 OBESITY (BMI 30-39.9): ICD-10-CM

## 2018-02-02 DIAGNOSIS — K59.01 SLOW TRANSIT CONSTIPATION: ICD-10-CM

## 2018-02-02 LAB
ALBUMIN SERPL BCP-MCNC: 4 G/DL (ref 3.2–4.9)
ALBUMIN/GLOB SERPL: 1.4 G/DL
ALP SERPL-CCNC: 56 U/L (ref 30–99)
ALT SERPL-CCNC: 25 U/L (ref 2–50)
ANION GAP SERPL CALC-SCNC: 6 MMOL/L (ref 0–11.9)
AST SERPL-CCNC: 21 U/L (ref 12–45)
BASOPHILS # BLD AUTO: 1 % (ref 0–1.8)
BASOPHILS # BLD: 0.04 K/UL (ref 0–0.12)
BILIRUB SERPL-MCNC: 0.6 MG/DL (ref 0.1–1.5)
BUN SERPL-MCNC: 14 MG/DL (ref 8–22)
CALCIUM SERPL-MCNC: 9.7 MG/DL (ref 8.5–10.5)
CHLORIDE SERPL-SCNC: 106 MMOL/L (ref 96–112)
CO2 SERPL-SCNC: 27 MMOL/L (ref 20–33)
CREAT SERPL-MCNC: 0.81 MG/DL (ref 0.5–1.4)
EOSINOPHIL # BLD AUTO: 0.14 K/UL (ref 0–0.51)
EOSINOPHIL NFR BLD: 3.5 % (ref 0–6.9)
ERYTHROCYTE [DISTWIDTH] IN BLOOD BY AUTOMATED COUNT: 45.1 FL (ref 35.9–50)
GLOBULIN SER CALC-MCNC: 2.8 G/DL (ref 1.9–3.5)
GLUCOSE SERPL-MCNC: 118 MG/DL (ref 65–99)
HCT VFR BLD AUTO: 47.9 % (ref 37–47)
HGB BLD-MCNC: 15.6 G/DL (ref 12–16)
IMM GRANULOCYTES # BLD AUTO: 0.01 K/UL (ref 0–0.11)
IMM GRANULOCYTES NFR BLD AUTO: 0.2 % (ref 0–0.9)
LYMPHOCYTES # BLD AUTO: 1.47 K/UL (ref 1–4.8)
LYMPHOCYTES NFR BLD: 36.7 % (ref 22–41)
MCH RBC QN AUTO: 32.2 PG (ref 27–33)
MCHC RBC AUTO-ENTMCNC: 32.6 G/DL (ref 33.6–35)
MCV RBC AUTO: 98.8 FL (ref 81.4–97.8)
MONOCYTES # BLD AUTO: 0.41 K/UL (ref 0–0.85)
MONOCYTES NFR BLD AUTO: 10.2 % (ref 0–13.4)
NEUTROPHILS # BLD AUTO: 1.94 K/UL (ref 2–7.15)
NEUTROPHILS NFR BLD: 48.4 % (ref 44–72)
NRBC # BLD AUTO: 0 K/UL
NRBC BLD-RTO: 0 /100 WBC
PLATELET # BLD AUTO: 239 K/UL (ref 164–446)
PMV BLD AUTO: 10.6 FL (ref 9–12.9)
POTASSIUM SERPL-SCNC: 4.6 MMOL/L (ref 3.6–5.5)
PROT SERPL-MCNC: 6.8 G/DL (ref 6–8.2)
RBC # BLD AUTO: 4.85 M/UL (ref 4.2–5.4)
SODIUM SERPL-SCNC: 139 MMOL/L (ref 135–145)
T3FREE SERPL-MCNC: 3.85 PG/ML (ref 2.4–4.2)
T4 FREE SERPL-MCNC: 1.05 NG/DL (ref 0.53–1.43)
TSH SERPL DL<=0.005 MIU/L-ACNC: 0.27 UIU/ML (ref 0.38–5.33)
WBC # BLD AUTO: 4 K/UL (ref 4.8–10.8)

## 2018-02-02 PROCEDURE — 80053 COMPREHEN METABOLIC PANEL: CPT

## 2018-02-02 PROCEDURE — 84443 ASSAY THYROID STIM HORMONE: CPT

## 2018-02-02 PROCEDURE — 84481 FREE ASSAY (FT-3): CPT

## 2018-02-02 PROCEDURE — 36415 COLL VENOUS BLD VENIPUNCTURE: CPT

## 2018-02-02 PROCEDURE — 85025 COMPLETE CBC W/AUTO DIFF WBC: CPT

## 2018-02-02 PROCEDURE — 84439 ASSAY OF FREE THYROXINE: CPT

## 2018-02-06 ENCOUNTER — OFFICE VISIT (OUTPATIENT)
Dept: ENDOCRINOLOGY | Facility: MEDICAL CENTER | Age: 68
End: 2018-02-06
Payer: MEDICARE

## 2018-02-06 VITALS
BODY MASS INDEX: 34.87 KG/M2 | WEIGHT: 217 LBS | HEIGHT: 66 IN | OXYGEN SATURATION: 94 % | DIASTOLIC BLOOD PRESSURE: 96 MMHG | SYSTOLIC BLOOD PRESSURE: 136 MMHG | HEART RATE: 93 BPM

## 2018-02-06 DIAGNOSIS — E05.90 HYPERTHYROIDISM: ICD-10-CM

## 2018-02-06 PROCEDURE — 99213 OFFICE O/P EST LOW 20 MIN: CPT | Performed by: INTERNAL MEDICINE

## 2018-02-06 NOTE — PROGRESS NOTES
Chief Complaint   Patient presents with   • Thyrotoxicosis     Graves' disease        HPI:    Hyperthyroidism, Graves' disease          The patient is feeling significantly better today. Her thyroid levels have come up a little bit by reducing her methimazole to 2.5 mg every other day. Her TSH went down from low normal at 0.7 down to 0.2 and her free T4 came up from 0.9 up to 1.0 and free T3 came up 3.5 up to 3.8. Definitely feeling better and not thyrotoxic. I think what we can say since her TSH went down a little bit that she is not in remission and should stay on that small dose of methimazole for now.     ROS:  A large part of her relief came from evacuating her bowels after we discussed that the last time she was in. Her abdomen is more comfortable. Her diet is still very limited and yet she cannot lose weight. She did manage to lose 2 pounds in the last month. She will stay on that limited diet.    I offered to send her to our new medical weight management program and she feels that she knows works best for her. So she declined    All other systems reported as negative or unchanged since last exam      Allergies: No Known Allergies    Current medicines including changes today:  Current Outpatient Prescriptions   Medication Sig Dispense Refill   • methimazole (TAPAZOLE) 5 MG Tab Take 0.5 Tabs by mouth every 48 hours. 30 Tab 3   • losartan (COZAAR) 50 MG Tab Take 1 Tab by mouth every day. 90 Tab 3   • MAGNESIUM PO Take  by mouth.     • DIGESTIVE ENZYMES PO Take  by mouth.     • aspirin (ASA) 81 MG Chew Tab chewable tablet Take 81 mg by mouth every day.     • ascorbic acid (ASCORBIC ACID) 500 MG Tab Take 500 mg by mouth every day.     • vitamin D (CHOLECALCIFEROL) 1000 UNIT Tab Take 1,000 Units by mouth every day.     • acetaminophen (TYLENOL) 500 MG Tab Take 500-1,000 mg by mouth every 6 hours as needed.     • non-formulary med Vibrant Detox Tablet - 3 tabs at night       No current facility-administered  "medications for this visit.         Past Medical History:   Diagnosis Date   • Hyperlipidemia    • Hypertension    • Hyperthyroidism     Graves' disease / thyrotropin receptor antibody = 6.2       PHYSICAL EXAM:    /96   Pulse 93   Ht 1.676 m (5' 6\")   Wt 98.4 kg (217 lb)   SpO2 94%   BMI 35.02 kg/m²      Gen.   appears healthy     Skin   appropriate for sex and age    HEENT  no eye signs of Graves' disease    Neck   thyroid gland is partially substernal and difficult to palpate. I don't think it's a large gland    Heart  regular    Extremities  no edema    Neuro  gait and station normal    Psych  appropriate    ASSESSMENT AND RECOMMENDATIONS    1. Hyperthyroidism, Graves' disease             Controlled with methimazole 2.5 mg every other day. She is not in remission..  - FREE THYROXINE; Future  - T3 FREE; Future  - TSH; Future  - THYROTROPIN RECEP AB; Future      DISPOSITION:   Return in one month       Denny Ireland M.D.    Copies to: Pia Dominguez M.D. [phone]  "

## 2018-02-28 ENCOUNTER — HOSPITAL ENCOUNTER (OUTPATIENT)
Dept: LAB | Facility: MEDICAL CENTER | Age: 68
End: 2018-02-28
Attending: INTERNAL MEDICINE
Payer: MEDICARE

## 2018-02-28 DIAGNOSIS — E05.90 HYPERTHYROIDISM: ICD-10-CM

## 2018-02-28 LAB
T3FREE SERPL-MCNC: 3.72 PG/ML (ref 2.4–4.2)
T4 FREE SERPL-MCNC: 1.02 NG/DL (ref 0.53–1.43)
TSH SERPL DL<=0.005 MIU/L-ACNC: 0.35 UIU/ML (ref 0.38–5.33)

## 2018-02-28 PROCEDURE — 84443 ASSAY THYROID STIM HORMONE: CPT

## 2018-02-28 PROCEDURE — 84481 FREE ASSAY (FT-3): CPT

## 2018-02-28 PROCEDURE — 84439 ASSAY OF FREE THYROXINE: CPT

## 2018-02-28 PROCEDURE — 83520 IMMUNOASSAY QUANT NOS NONAB: CPT

## 2018-02-28 PROCEDURE — 36415 COLL VENOUS BLD VENIPUNCTURE: CPT

## 2018-03-02 LAB — TSH RECEP AB SER-ACNC: 5.02 IU/L

## 2018-03-06 ENCOUNTER — OFFICE VISIT (OUTPATIENT)
Dept: ENDOCRINOLOGY | Facility: MEDICAL CENTER | Age: 68
End: 2018-03-06
Payer: MEDICARE

## 2018-03-06 VITALS
OXYGEN SATURATION: 97 % | SYSTOLIC BLOOD PRESSURE: 128 MMHG | HEIGHT: 66 IN | DIASTOLIC BLOOD PRESSURE: 80 MMHG | WEIGHT: 216 LBS | HEART RATE: 93 BPM | BODY MASS INDEX: 34.72 KG/M2

## 2018-03-06 DIAGNOSIS — E66.9 OBESITY (BMI 30-39.9): Primary | ICD-10-CM

## 2018-03-06 DIAGNOSIS — E05.90 HYPERTHYROIDISM: ICD-10-CM

## 2018-03-06 PROCEDURE — 99213 OFFICE O/P EST LOW 20 MIN: CPT | Performed by: INTERNAL MEDICINE

## 2018-03-07 NOTE — PROGRESS NOTES
"Chief Complaint   Patient presents with   • Thyrotoxicosis        HPI:    See assessment and recommendations below    ROS:   Patient is absolutely beside herself because of her inability to control her weight. She is on a very severe diet which her  attests and cannot lose.  I will screen for other disorders such as Cushing's and a pituitary insufficiency that might suggest that the TSH is misleading my assessment    I also discussed referral to the [facility] weight control [facility]      Allergies: No Known Allergies    Current medicines including changes today:  Current Outpatient Prescriptions   Medication Sig Dispense Refill   • methimazole (TAPAZOLE) 5 MG Tab Take 0.5 Tabs by mouth every 48 hours. 30 Tab 3   • losartan (COZAAR) 50 MG Tab Take 1 Tab by mouth every day. 90 Tab 3   • MAGNESIUM PO Take  by mouth.     • DIGESTIVE ENZYMES PO Take  by mouth.     • aspirin (ASA) 81 MG Chew Tab chewable tablet Take 81 mg by mouth every day.     • ascorbic acid (ASCORBIC ACID) 500 MG Tab Take 500 mg by mouth every day.     • vitamin D (CHOLECALCIFEROL) 1000 UNIT Tab Take 1,000 Units by mouth every day.     • acetaminophen (TYLENOL) 500 MG Tab Take 500-1,000 mg by mouth every 6 hours as needed.     • non-formulary med Vibrant Detox Tablet - 3 tabs at night       No current facility-administered medications for this visit.         Past Medical History:   Diagnosis Date   • Hyperlipidemia    • Hypertension    • Hyperthyroidism     Graves' disease / thyrotropin receptor antibody = 6.2       PHYSICAL EXAM:    /80   Pulse 93   Ht 1.676 m (5' 6\")   Wt 98 kg (216 lb)   SpO2 97%   BMI 34.86 kg/m²      Gen.   Obese. No specific cushingoid features    Skin   appropriate for sex and age    HEENT  no joo MYOPATHY    Neck   thyroid gland is about normal size    Heart  regular    Extremities  no edema    Neuro  gait and station normal, no tremor    Psych    weepy concerning her inability to control her " weight    ASSESSMENT AND RECOMMENDATIONS    1. Hyperthyroidism, Graves' disease            Positive thyrotropin receptor antibody has declined only a little. It is 5.0 and initially was 5.6            Current TSH is low normal at 0.3    free thyroxine is mid range at 1.0 and free T3 is upper normal at 3.7.             Continue low-dose methimazole  I.e.  2.5 mg every other day             Rule out pituitary insufficiency that might suggest TSH is not valid measure of thyroid function  - FREE THYROXINE; Future  - T3 FREE; Future  - TSH; Future  -    2. Obesity (BMI 30-39.9)             Rule out Cushing's disease  - REFERRAL TO  Dr. Mor[facility] medical weight clinic      DISPOSITION:  Return in one month       Denny Ireland M.D.    Copies to: Pia Dominguez M.D. [phone]

## 2018-03-08 ENCOUNTER — HOSPITAL ENCOUNTER (OUTPATIENT)
Facility: MEDICAL CENTER | Age: 68
End: 2018-03-08
Attending: INTERNAL MEDICINE
Payer: MEDICARE

## 2018-03-08 ENCOUNTER — HOSPITAL ENCOUNTER (OUTPATIENT)
Dept: LAB | Facility: MEDICAL CENTER | Age: 68
End: 2018-03-08
Attending: INTERNAL MEDICINE
Payer: MEDICARE

## 2018-03-08 DIAGNOSIS — E66.9 OBESITY (BMI 30-39.9): ICD-10-CM

## 2018-03-08 LAB
CORTIS SERPL-MCNC: 7.8 UG/DL (ref 0–23)
FSH SERPL-ACNC: 51.6 MIU/ML

## 2018-03-08 PROCEDURE — 36415 COLL VENOUS BLD VENIPUNCTURE: CPT

## 2018-03-08 PROCEDURE — 83001 ASSAY OF GONADOTROPIN (FSH): CPT

## 2018-03-08 PROCEDURE — 82533 TOTAL CORTISOL: CPT

## 2018-03-08 PROCEDURE — 84305 ASSAY OF SOMATOMEDIN: CPT

## 2018-03-08 PROCEDURE — 82024 ASSAY OF ACTH: CPT

## 2018-03-09 ENCOUNTER — HOSPITAL ENCOUNTER (OUTPATIENT)
Facility: MEDICAL CENTER | Age: 68
End: 2018-03-09
Attending: INTERNAL MEDICINE
Payer: MEDICARE

## 2018-03-09 DIAGNOSIS — E66.9 OBESITY (BMI 30-39.9): ICD-10-CM

## 2018-03-09 PROCEDURE — 82530 CORTISOL FREE: CPT

## 2018-03-10 LAB
ACTH PLAS-MCNC: 12 PG/ML (ref 6–58)
IGF-I SERPL-MCNC: 104 NG/ML (ref 32–238)
IGF-I Z-SCORE SERPL: 0

## 2018-03-13 LAB
ANNOTATION COMMENT IMP: NORMAL
COLLECT DURATION TIME SPEC: 24 HRS
CORTIS F 24H UR HPLC-MCNC: 8.07 UG/L
CORTIS F 24H UR-MRATE: 16.1 UG/D
CORTIS F/CREAT 24H UR: 15.82 UG/G CRT
CREAT 24H UR-MCNC: 51 MG/DL
CREAT 24H UR-MRATE: 1020 MG/D (ref 500–1400)
SPECIMEN VOL ?TM UR: 2000 ML
TEST NAME 95000: NORMAL

## 2018-03-15 ENCOUNTER — TELEPHONE (OUTPATIENT)
Dept: ENDOCRINOLOGY | Facility: MEDICAL CENTER | Age: 68
End: 2018-03-15

## 2018-03-15 DIAGNOSIS — R79.89 ABNORMAL CORTISOL LEVEL: ICD-10-CM

## 2018-03-15 RX ORDER — DEXAMETHASONE 0.5 MG/1
TABLET ORAL
Qty: 2 TAB | Refills: 0 | Status: SHIPPED | OUTPATIENT
Start: 2018-03-15 | End: 2018-06-25

## 2018-03-15 NOTE — TELEPHONE ENCOUNTER
Partial test results discussed with patient.    A.m. plasma cortisol and ACTH are normal.    Midnight salivary cortisol is abnormal. It is 0.2 and normal should be less than 0.11.    24 hour urinary cortisol is pending.    We will proceed with a 1 mg dexamethasone suppression test.    Denny Ireland M.D.

## 2018-03-20 ENCOUNTER — HOSPITAL ENCOUNTER (OUTPATIENT)
Dept: LAB | Facility: MEDICAL CENTER | Age: 68
End: 2018-03-20
Attending: INTERNAL MEDICINE
Payer: MEDICARE

## 2018-03-20 DIAGNOSIS — R79.89 ABNORMAL CORTISOL LEVEL: ICD-10-CM

## 2018-03-20 LAB — CORTIS SERPL-MCNC: 1.2 UG/DL (ref 0–23)

## 2018-03-20 PROCEDURE — 82533 TOTAL CORTISOL: CPT

## 2018-03-20 PROCEDURE — 36415 COLL VENOUS BLD VENIPUNCTURE: CPT

## 2018-04-05 ENCOUNTER — OFFICE VISIT (OUTPATIENT)
Dept: HEALTH INFORMATION MANAGEMENT | Facility: MEDICAL CENTER | Age: 68
End: 2018-04-05
Payer: MEDICARE

## 2018-04-05 VITALS
SYSTOLIC BLOOD PRESSURE: 140 MMHG | DIASTOLIC BLOOD PRESSURE: 98 MMHG | HEART RATE: 94 BPM | OXYGEN SATURATION: 95 % | BODY MASS INDEX: 35.05 KG/M2 | WEIGHT: 218.1 LBS | HEIGHT: 66 IN

## 2018-04-05 DIAGNOSIS — E66.9 OBESITY (BMI 30-39.9): ICD-10-CM

## 2018-04-05 DIAGNOSIS — I10 ESSENTIAL HYPERTENSION: ICD-10-CM

## 2018-04-05 DIAGNOSIS — E05.00 GRAVES DISEASE: ICD-10-CM

## 2018-04-05 DIAGNOSIS — E78.2 MIXED HYPERLIPIDEMIA: ICD-10-CM

## 2018-04-05 PROCEDURE — 93000 ELECTROCARDIOGRAM COMPLETE: CPT | Performed by: INTERNAL MEDICINE

## 2018-04-05 PROCEDURE — 99205 OFFICE O/P NEW HI 60 MIN: CPT | Mod: 25 | Performed by: INTERNAL MEDICINE

## 2018-04-05 NOTE — PROGRESS NOTES
Bariatric Medicine H&P  Chief Complaint   Patient presents with   • Weight Gain       Referred by:  Denny Ireland M.D.    History of Present Illness:   Jasiel Garrett is a 67 y.o.  female who presents with her  for weight management and to help address co-morbidities related to overweight, including hyperlipidemia, hypertension, knee osteoarthritis. She also has hyperthyroidism, has been on methimazole for treatment of Graves' disease.    Patient is frustrated by ongoing obesity. She was hopeful that with her diagnosis of hyperthyroidism, she would be able to lose weight, but has not been able to. She is most concerned about constipation and bloating since 2016. She feels very uncomfortable, has noted constipation on abdominal x-rays, does not know what to do about it. She has been lately using MiraLAX, sometimes takes Colace or senna, and has tried other remedies without significant relief.    The patient was in a weight loss program, using Ideal protein shakes for about 6 months. She did lose 28 pounds, but when she stopped using the Ideal protein, she regained the weight again. She reached a plateau after about 3 months and could not lose anymore weight. Part of that regimen was ketosis, where she was to take less than 40 g of carbs per day, which she found hard to sustain.    She has been embarrassed when relatives visit and has experienced body shaming, so in the last week trying to eat differently, in hopes of losing some weight before her family members arrive for visit. She is eating an orange or prune juice or prunes for breakfast, is often not hungry at this time. For lunch she has oatmeal with fruit, or cottage cheese with veggies and crackers. Sometimes she'll have a turkey sandwich with chips. For dinner she has a protein with vegetables, one piece of dark chocolate. She snacks on low-salt proteins and nut mixes. She snacks in between meals and after dinner. She mostly drinks water,  "some electrolyte drinks or coffee.    She sometimes skips a meal, thinking that will help her lose weight but it usually does not.    She seems reluctant to change, can't believe she has a weight issue. She is willing to consider a meal replacement shake for breakfast, maybe adding a second one if she does well on one. She would consider a weight loss medication if needed. Her  is very supportive, encouraging.    Stable on methimazole. Continues on vitamin D repletion. Blood pressure stable on Cozaar, although high today.      Behavior-Related History:  Binge eating screen: Negative       Exercise:   Recumbent bike or elliptical  40 minutes 3 times per week    Life Style Changes:  Significant history of infertility, has not had children.     Review of Systems   Positive for constipation, joint pains and stiffness. Snores while asleep.  Sleep apnea screen: Was diagnosed with sleep apnea in the past, more recent pulse ox normal, not using CPAP and does not think she has EKTA any more.  All other ROS were reviewed with patient today and are negative.      PMH/PSH:  I have reviewed the patient's medical, social and family history, allergies, and medications today.  Prior records reviewed.  Personal Hx of Bariatric Surgery: Negative  Retired schoolteacher, .      Physical Exam:   /98   Pulse 94   Ht 1.676 m (5' 6\")   Wt 98.9 kg (218 lb 1.6 oz)   SpO2 95%   BMI 35.20 kg/m²    Waist: 44 in  Body fat % 51.8  REE 1558 kcal/day    Constitutional: Oriented to person, place, and time and well-developed, well-nourished, and in no distress.    HENT: No facial plethora.  No Cushingoid features.  No scalloped tongue.  No dental erosions.  No swollen parotids.  Head: Normocephalic.   Eyes: EOM are normal. Pupils are equal, round, and reactive to light. No periorbital edema.  No lateral thinning of eyebrows.  No vertical nystagmus.  Neck: Normal range of motion. Neck supple. No thyromegaly present. No " buffalo hump.  Cardiovascular: Normal rate and regular rhythm.  No murmur heard.  Pulmonary/Chest: Effort normal and breath sounds normal. No wheezes.   Abdominal: Soft. Bowel sounds are normal. Grade 1 pannus.  No ascites.  No hepatosplenomegaly.  No red striae.  Musculoskeletal: Normal range of motion. No edema.   Neurological: Alert and oriented to person, place, and time. Normal reflexes. No cranial nerve deficit. No muscle weakness.  Gait normal.   Skin: Warm and dry. Not diaphoretic. No hirsuitism.  No acanthosis nigricans.  Not excessively dry, scaly.  No acne.  No bruising/ecchymosis.  No hyperpigmentation.  No xanthomas or acrochordon.  No violaceous striae.  No keratosis pilaris.  Psychiatric: Mood, memory, and judgment normal.  Blunted affect.    Laboratory:   Prior labs reviewed.  EKG: Sinus, rate 80, no concerning ST T abnormalities, PA-C, corrected QT 0.430  Ordered and reviewed by me today.    Dietitian Assessment: I have reviewed the Dietitian's assessment related to this encounter.       ASSESSMENT/PLAN:  Body mass index is 35.2 kg/m².   Obesity Stage ([place]) 1; Class 2    1. Obesity (BMI 30-39.9)  EKG   2. Essential hypertension  EKG   3. Mixed hyperlipidemia         The patient is very distraught about her obesity. Doubt Graves' disease playing a significant role in her obesity. Calorie intake likely higher than she thinks, especially given her resting energy expenditure. She seems reluctant to change, although has some knowledge about reducing carbohydrate intake from a previous ketogenic diet which should help her moving forward as we reintroduce a very low carb diet. I suspect she is very sensitive to carbohydrates. She will need to make this reduction more permanent. Encouraged meeting with dietitian via IBT. Will also start adding 1 meal replacement for breakfast, which she is often skipping, so she does not snack as much during the day.    The patient and I have discussed at length and  agree to the following recommendations, which are all addressing the above diagnoses:    Weight Goal: 5% wt loss at one month after start (pt goal weight is 175 lb)  Diet: Low carb/high-protein lunch and dinner  2 low carb/high-protein snacks in between meals as per list given today; will also try Robard snack bar  Start one Robard meal replacement shake for breakfast  Physical Activity: Continue elliptical /recumbent bike 40 minutes 3 days per week  Will increase exercise to more days weekly going forward  Risk level for moderate/vigorous exercise program: Low  New Rx: None. Consider adding weight loss medication pending course  Consider antidepressant such as Wellbutrin.  Colace twice a day for constipation  Side Effects: Will review consent if applicable.  Behavior change: Continue to assess patient's readiness to change  Follow-up: 2 weeks  IBT visit scheduled    Face to face time spent 60 minutes,  with >50% of time devoted to one on one counseling on weight management issues, as documented above.      Thank you for your referral!

## 2018-04-13 ENCOUNTER — HOSPITAL ENCOUNTER (OUTPATIENT)
Dept: LAB | Facility: MEDICAL CENTER | Age: 68
End: 2018-04-13
Attending: INTERNAL MEDICINE
Payer: MEDICARE

## 2018-04-13 DIAGNOSIS — E05.90 HYPERTHYROIDISM: ICD-10-CM

## 2018-04-13 LAB
T3FREE SERPL-MCNC: 3.48 PG/ML (ref 2.4–4.2)
T4 FREE SERPL-MCNC: 1.11 NG/DL (ref 0.53–1.43)
TSH SERPL DL<=0.005 MIU/L-ACNC: 0.22 UIU/ML (ref 0.38–5.33)

## 2018-04-13 PROCEDURE — 84439 ASSAY OF FREE THYROXINE: CPT

## 2018-04-13 PROCEDURE — 36415 COLL VENOUS BLD VENIPUNCTURE: CPT

## 2018-04-13 PROCEDURE — 84481 FREE ASSAY (FT-3): CPT

## 2018-04-13 PROCEDURE — 84443 ASSAY THYROID STIM HORMONE: CPT

## 2018-04-16 ENCOUNTER — OFFICE VISIT (OUTPATIENT)
Dept: ENDOCRINOLOGY | Facility: MEDICAL CENTER | Age: 68
End: 2018-04-16
Payer: MEDICARE

## 2018-04-16 VITALS
HEIGHT: 66 IN | WEIGHT: 215 LBS | OXYGEN SATURATION: 94 % | BODY MASS INDEX: 34.55 KG/M2 | DIASTOLIC BLOOD PRESSURE: 92 MMHG | SYSTOLIC BLOOD PRESSURE: 134 MMHG | HEART RATE: 94 BPM

## 2018-04-16 DIAGNOSIS — E05.00 GRAVES DISEASE: Primary | ICD-10-CM

## 2018-04-16 DIAGNOSIS — E05.90 HYPERTHYROIDISM: ICD-10-CM

## 2018-04-16 PROCEDURE — 99213 OFFICE O/P EST LOW 20 MIN: CPT | Performed by: INTERNAL MEDICINE

## 2018-04-17 NOTE — PROGRESS NOTES
"Chief Complaint   Patient presents with   • Thyrotoxicosis        HPI:    See assessment and recommendations below    ROS:   Very pleased with her success under Dr. Monsivais's care. Feeling so much better already.  All other systems reported as negative or unchanged since last exam      Allergies: Not on File    Current medicines including changes today:  Current Outpatient Prescriptions   Medication Sig Dispense Refill   • methimazole (TAPAZOLE) 5 MG Tab Take 0.5 Tabs by mouth every 48 hours. 30 Tab 3   • losartan (COZAAR) 50 MG Tab Take 1 Tab by mouth every day. 90 Tab 3   • MAGNESIUM PO Take  by mouth.     • aspirin (ASA) 81 MG Chew Tab chewable tablet Take 81 mg by mouth every day.     • ascorbic acid (ASCORBIC ACID) 500 MG Tab Take 500 mg by mouth every day.     • vitamin D (CHOLECALCIFEROL) 1000 UNIT Tab Take 1,000 Units by mouth every day.     • acetaminophen (TYLENOL) 500 MG Tab Take 500-1,000 mg by mouth every 6 hours as needed.     • dexamethasone (DECADRON) 0.5 MG Tab Take 2 tablets by mouth at midnight for test in a.m. (Patient not taking: Reported on 4/16/2018) 2 Tab 0   • non-formulary med Vibrant Detox Tablet - 3 tabs at night     • DIGESTIVE ENZYMES PO Take  by mouth.       No current facility-administered medications for this visit.         Past Medical History:   Diagnosis Date   • Hyperlipidemia    • Hypertension    • Hyperthyroidism     Graves' disease / thyrotropin receptor antibody = 6.2       PHYSICAL EXAM:    /92   Pulse 94   Ht 1.676 m (5' 6\")   Wt 97.5 kg (215 lb)   SpO2 94%   BMI 34.70 kg/m²       Gen.   Overweight but otherwise appears healthy     Skin   appropriate for sex and age    HEENT  unremarkable    Neck   thyroid gland is difficult to palpate. It is mostly substernal    Heart  regular    Extremities  no edema    Neuro  gait and station normal    Psych  appropriate, good spirits      ASSESSMENT AND RECOMMENDATIONS    1. Hyperthyroidism, Graves' disease           " Asymptomatic taking methimazole 2.5 mg every other day.           TSH is still suppressed at 0.2.  Free T4 is mid range at 1.1 and free T3 also midrange at 3.4.           No dose change indicated. Recheck in one month.           Update thyrotropin receptor antibodies and see if we can anticipate a remission and discontinue methimazole.            - FREE THYROXINE; Future  - T3 FREE; Future  - TSH; Future    2. Graves disease           No significant ophthalmopathy  - THYROTROPIN RECEP AB; Future      DISPOSITION: Return in one month       Denny Ireland M.D.    Copies to: Pia Dominguez M.D. [phone]

## 2018-04-19 ENCOUNTER — OFFICE VISIT (OUTPATIENT)
Dept: HEALTH INFORMATION MANAGEMENT | Facility: MEDICAL CENTER | Age: 68
End: 2018-04-19
Payer: MEDICARE

## 2018-04-19 VITALS
HEIGHT: 66 IN | BODY MASS INDEX: 34.57 KG/M2 | HEART RATE: 87 BPM | WEIGHT: 215.1 LBS | SYSTOLIC BLOOD PRESSURE: 146 MMHG | OXYGEN SATURATION: 95 % | DIASTOLIC BLOOD PRESSURE: 90 MMHG

## 2018-04-19 DIAGNOSIS — E66.9 OBESITY (BMI 30-39.9): ICD-10-CM

## 2018-04-19 DIAGNOSIS — I10 ESSENTIAL HYPERTENSION: ICD-10-CM

## 2018-04-19 DIAGNOSIS — E78.2 MIXED HYPERLIPIDEMIA: ICD-10-CM

## 2018-04-19 DIAGNOSIS — K59.01 SLOW TRANSIT CONSTIPATION: ICD-10-CM

## 2018-04-19 PROCEDURE — 99213 OFFICE O/P EST LOW 20 MIN: CPT | Performed by: INTERNAL MEDICINE

## 2018-04-19 NOTE — PROGRESS NOTES
Bariatric Medicine Follow Up  Chief Complaint   Patient presents with   • Weight Gain       History of Present Illness:   Jasiel Garrett is a 68 y.o. female who presents for weight management follow-up with her  and to help address co-morbidities related to overweight, including hypertension, hyperlipidemia.    During the patient's last visit, the following were discussed and recommended:  Weight Goal: 5% wt loss at one month after start (pt goal weight is 175 lb)  Diet: Low carb/high-protein lunch and dinner  2 low carb/high-protein snacks in between meals as per list given today; will also try Robard snack bar  Start one Robard meal replacement shake for breakfast  Physical Activity: Continue elliptical /recumbent bike 40 minutes 3 days per week  Will increase exercise to more days weekly going forward  Risk level for moderate/vigorous exercise program: Low  New Rx: None. Consider adding weight loss medication pending course  Consider antidepressant such as Wellbutrin.  Colace twice a day for constipation  Side Effects: Will review consent if applicable.  Behavior change: Continue to assess patient's readiness to change  Follow-up: 2 weeks  IBT visit scheduled    The patient appears much happier today. She has lost 3 pounds, 2 inches off her waist since her visit 2 weeks ago. She is having a Robard meal replacement for breakfast (will switch to premier protein as she prefers the taste), late morning snack, small high-protein/low-carb lunch. She is not feeling hungry. She is having a midafternoon protein snack, and high-protein/low-carb dinner. She likes to have a Robard snack bar after dinner, as this was the time she was snacking most. Overall, she is not feeling hungry, feels much better, and happy she is back to this program. She realizes she cannot tolerate carbohydrates nearly as much as she thought. She wonders if she can eliminate the mid morning snack as she is not feeling hungry.    Blood  "pressure control fair on losartan. Continues on vitamin D repletion. Not requiring a statin at this time. Last lipids checked July 2017. Constipation still a problem despite twice daily Colace. Switched to senna, which seems to be working better for her. Patient's Endocrinologist feels her medication dosing is appropriate, given her Graves' disease.    Feels this is close to her ketogenic diet, which she had success with in the past, feels encouraged to continue as she had had weight loss in the last two weeks.    Using 1500 mg of Tylenol per day, wonders if she needs to take that with food, does help her joint pain.    Exercise:   Walking more, using elliptical, recumbent bike mostly.     Review of Systems   Constipation but controlled now with senna. Joint aches, come and go.  All other ROS were reviewed and are otherwise unchanged from my previous visit with patient.    Physical Exam:    /90   Pulse 87   Ht 1.676 m (5' 6\")   Wt 97.6 kg (215 lb 1.6 oz)   SpO2 95%   BMI 34.72 kg/m²    Waist: 42 in  Body fat % 51.5  REE 1544 kcal/day    Weight change since last visit: -3 lb   Waist Circum change since last visit: -2 in     Constitutional: Oriented to person, place, and time and well-developed, well-nourished, and in no distress.    Head: Normocephalic.   Musculoskeletal: Normal range of motion. No edema.   Neurological: Alert and oriented to person, place, and time. No muscle weakness.  Gait normal.   Skin: Warm and dry. Not diaphoretic.   Psychiatric: Mood, memory, affect and judgment normal.     Laboratory:   Recent labs reviewed. CMP normal 11/2017  Lipids due 7/2018      ASSESSMENT/PLAN:  Body mass index is 34.72 kg/m².    Obesity Stage ([place]):  1; Class 1    1. Obesity (BMI 30-39.9)     2. Essential hypertension     3. Mixed hyperlipidemia     4. Slow transit constipation       The patient appears very encouraged with her progress so far, appears ready to make more permanent change to her diet " and eating behaviors. Will continue to monitor blood pressure, lipids, constipation. May need to increase losartan if weight loss does not reduce blood pressure to normal. Constipation appears controlled.     The patient and I have discussed at length and agree to the following recommendations, which are all addressing the above diagnoses:    Weight Goal: 3-5% wt loss each month (pt goal weight is 175 lb)  Diet: Premier protein meal replacement for breakfast  Can skip late morning protein snack if too close to lunch and not hungry  High-protein/low-carb lunch and dinner  Robard snack bar after dinner and high-protein snack midafternoon  Continue 64+ ounces water per day  Physical Activity: Continue elliptical, recumbent bike, walking 40 minutes at least 3 days per week, goal is daily  Risk level for moderate/vigorous exercise program: Low  New Rx: None  Senna OTC for constipation  Consider increasing losartan if blood pressure remains elevated.  Side Effects: Will review consent if applicable.  Behavior change: Tracking, mindful eating, stimulus narrowing  Follow-up: One month per patient request  IBT visit next week.  Recheck BMP, lipids 7/2018

## 2018-04-23 ENCOUNTER — NON-PROVIDER VISIT (OUTPATIENT)
Dept: MEDICAL GROUP | Facility: LAB | Age: 68
End: 2018-04-23
Payer: MEDICARE

## 2018-04-23 VITALS — BODY MASS INDEX: 34.63 KG/M2 | WEIGHT: 215.5 LBS | HEIGHT: 66 IN

## 2018-04-23 PROCEDURE — G0447 BEHAVIOR COUNSEL OBESITY 15M: HCPCS | Performed by: PHYSICIAN ASSISTANT

## 2018-04-23 NOTE — PROGRESS NOTES
"IBT INITIAL ASSESMENT    Author: Jeanie MIRZACesar Sherman Date & Time created: 4/23/2018  10:48 AM   Visit #: 1  Referring Provider: Pia Dominguez,*  Patient Age: 68 y.o.  Time in/Out:  [id number]    ASSESS:  Vitals:    04/23/18 1046   Weight: 97.8 kg (215 lb 8 oz)   Height: 1.676 m (5' 6\")      Vitals:    04/23/18 1046   Weight: 97.8 kg (215 lb 8 oz)   Height: 1.676 m (5' 6\")    Body mass index is 34.78 kg/m². Goal Weight:  175-180 lbs  The patient states overall health as a motivator for weight loss and has tried [place] program & ketosis for weight loss in the past with no lasting success.  Comorbidities include HLD, HTN, osteoarthritis in both knees.     Current Dietary/Exercise Habits  1.  How many servings of fruits and vegetables do you eat in a typical day?  2-3  2. How many servings of whole grains do you eat in a typical day?  1  3. How many times in a typical week do you eat foods high in fat or eat at a fast food restaurant?  0  4. How many times in a typical week do you eat red meat, pork, or processed meat?  0-2  5. How many days a week do you participate in moderately intense physical activity for 30 minutes?  0  6. How many days a week do you participate in vigorously intense physical activity for 20 minutes?  0  7. How many days a week do you do strength training exercise?  0    Diet Recall  B - Ideal shake  S - 0   L - protein + veggies  S - 1T PB or protein bar  D - protein + vegetable + 1/2 cup whole grains (quinoa, brown rice, squash or sweet potatoes)  S - berries in seltzer water in place of alcoholic beverage     Estimated Stage of Change   Actionas evidenced by exercising regularly and seeing Dr. Monsivais for weight loss.  ADVISE:    Physical Activity:  Increase ellipitical and stationary bike to 20 minutes 3 days a week. Walk on days in between.   Dietary Guidelines:  Follow plate guide for meals 4-6 ounces protein, 0-1 carb from carbs to choose from list, 1 fat, and 1/2 plate " non-starchy vegetables. 1-2 snacks a day - 1oz protein + NS vegetable or protein bar. 64 ounces of water a day. Don't skip meals.     The patient has been advised of how weight management and physical activity impacts their health and will help to reduce complications and health risk factors.      AGREE:  New Goals:  1. Continue recommendations from [facility] program  2. Increase ellipitical and stationary bike from 18 minutes to 20 min 3 days a week, walk on in between days    ASSIST:  Jazmin has already started to make improvements to her diet since first meeting with Dr. Monsivais for Medical Weight Management. She struggles with constipation and is on a regular bowel regimen. From her diet recall she is likely getting enough fiber and meeting her daily water intake goals - her constipation does not seem to be diet related. Her REE is 1558 and prior to the start of the MWM program she was likely exceeding her estimated energy needs. She is working on increasing her physical activity now that she is feeling better and her energy has improved. Jasiel does not like to track and would prefer not to keep a food journal ~ if she hits a plateau she has agreed to restart the journal. She is going to continue with her current plan and we will address any questions or concerns on her next visit. We will continue to work on increasing her daily physical activity and finding a regimen with her diet and exercise that is sustainable for her long-term.     ARRANGE:     Return for follow-up in 4 weeks    The patient was assisted in making follow-up appointment per orders.

## 2018-05-11 ENCOUNTER — HOSPITAL ENCOUNTER (OUTPATIENT)
Dept: LAB | Facility: MEDICAL CENTER | Age: 68
End: 2018-05-11
Attending: INTERNAL MEDICINE
Payer: MEDICARE

## 2018-05-11 DIAGNOSIS — E05.00 GRAVES DISEASE: ICD-10-CM

## 2018-05-11 DIAGNOSIS — E05.90 HYPERTHYROIDISM: ICD-10-CM

## 2018-05-11 LAB
T3FREE SERPL-MCNC: 3.93 PG/ML (ref 2.4–4.2)
T4 FREE SERPL-MCNC: 1.03 NG/DL (ref 0.53–1.43)
TSH SERPL DL<=0.005 MIU/L-ACNC: 0.3 UIU/ML (ref 0.38–5.33)

## 2018-05-11 PROCEDURE — 84443 ASSAY THYROID STIM HORMONE: CPT

## 2018-05-11 PROCEDURE — 84439 ASSAY OF FREE THYROXINE: CPT

## 2018-05-11 PROCEDURE — 83520 IMMUNOASSAY QUANT NOS NONAB: CPT

## 2018-05-11 PROCEDURE — 84481 FREE ASSAY (FT-3): CPT

## 2018-05-11 PROCEDURE — 36415 COLL VENOUS BLD VENIPUNCTURE: CPT

## 2018-05-14 LAB — TSH RECEP AB SER-ACNC: 4.69 IU/L

## 2018-05-15 ENCOUNTER — OFFICE VISIT (OUTPATIENT)
Dept: ENDOCRINOLOGY | Facility: MEDICAL CENTER | Age: 68
End: 2018-05-15
Payer: MEDICARE

## 2018-05-15 VITALS
OXYGEN SATURATION: 94 % | HEART RATE: 81 BPM | WEIGHT: 209 LBS | HEIGHT: 66 IN | BODY MASS INDEX: 33.59 KG/M2 | DIASTOLIC BLOOD PRESSURE: 88 MMHG | SYSTOLIC BLOOD PRESSURE: 122 MMHG

## 2018-05-15 DIAGNOSIS — E05.90 HYPERTHYROIDISM: ICD-10-CM

## 2018-05-15 PROCEDURE — 99213 OFFICE O/P EST LOW 20 MIN: CPT | Performed by: INTERNAL MEDICINE

## 2018-05-16 NOTE — PROGRESS NOTES
"Chief Complaint   Patient presents with   • Thyrotoxicosis     Graves' disease        HPI:    See assessment and recommendations below    ROS:   Continuing on her very successful diet program. Weight is down 6 pounds since April. She is very comfortable with her diet. Bowels are working satisfactorily  All other systems reported as negative or unchanged since last exam      Allergies: No Known Allergies    Current medicines including changes today:  Current Outpatient Prescriptions   Medication Sig Dispense Refill   • methimazole (TAPAZOLE) 5 MG Tab Take 0.5 Tabs by mouth every 48 hours. 30 Tab 3   • losartan (COZAAR) 50 MG Tab Take 1 Tab by mouth every day. 90 Tab 3   • MAGNESIUM PO Take  by mouth.     • aspirin (ASA) 81 MG Chew Tab chewable tablet Take 81 mg by mouth every day.     • ascorbic acid (ASCORBIC ACID) 500 MG Tab Take 500 mg by mouth every day.     • vitamin D (CHOLECALCIFEROL) 1000 UNIT Tab Take 1,000 Units by mouth every day.     • acetaminophen (TYLENOL) 500 MG Tab Take 500-1,000 mg by mouth every 6 hours as needed.     • dexamethasone (DECADRON) 0.5 MG Tab Take 2 tablets by mouth at midnight for test in a.m. (Patient not taking: Reported on 4/16/2018) 2 Tab 0   • non-formulary med Vibrant Detox Tablet - 3 tabs at night     • DIGESTIVE ENZYMES PO Take  by mouth.       No current facility-administered medications for this visit.         Past Medical History:   Diagnosis Date   • Hyperlipidemia    • Hypertension    • Hyperthyroidism     Graves' disease / thyrotropin receptor antibody = 6.2       PHYSICAL EXAM:    /88   Pulse 81   Ht 1.676 m (5' 5.98\")   Wt 94.8 kg (209 lb)   SpO2 94%   BMI 33.75 kg/m²      Gen.  Obese but otherwise appears healthy     Skin   appropriate for sex and age    HEENT  unremarkable    Neck   thyroid gland is small and difficult to palpate. We are hoping for remission with medical management    Heart  regular, no atrial fibrillation    Extremities  no edema    Neuro "  gait and station normal, no tremor    Psych  appropriate, good spirits    ASSESSMENT AND RECOMMENDATIONS    1. Hyperthyroidism           Low-dose methimazole 2.5 mg daily.           TSH is up to low normal at 0.3. Free T4 is mid range at 1.0 and free T3 is upper normal at 3.9.            We are hopeful that she can go into remission with medical management only. Thyrotropin receptor antibody however is still positive at 4.6. This implies no remission soon           I want to monitor her thyroid levels on a monthly basis but I do not have to physically see her. I will set up a standing order to be done monthly and we will communicate by my chart  - FREE THYROXINE; Standing  - T3 FREE; Standing  - TSH; Standing      DISPOSITION: Check thyroid levels monthly. Return to clinic in 3 months       Denny Ireland M.D.    Copies to: Pia Dominguez M.D. [phone]

## 2018-05-21 ENCOUNTER — OFFICE VISIT (OUTPATIENT)
Dept: HEALTH INFORMATION MANAGEMENT | Facility: MEDICAL CENTER | Age: 68
End: 2018-05-21
Payer: MEDICARE

## 2018-05-21 ENCOUNTER — APPOINTMENT (OUTPATIENT)
Dept: MEDICAL GROUP | Facility: LAB | Age: 68
End: 2018-05-21
Payer: MEDICARE

## 2018-05-21 VITALS
OXYGEN SATURATION: 95 % | DIASTOLIC BLOOD PRESSURE: 82 MMHG | BODY MASS INDEX: 33.52 KG/M2 | HEART RATE: 79 BPM | WEIGHT: 208.6 LBS | SYSTOLIC BLOOD PRESSURE: 136 MMHG | HEIGHT: 66 IN

## 2018-05-21 DIAGNOSIS — E78.2 MIXED HYPERLIPIDEMIA: ICD-10-CM

## 2018-05-21 DIAGNOSIS — E66.9 OBESITY (BMI 30-39.9): ICD-10-CM

## 2018-05-21 DIAGNOSIS — I10 ESSENTIAL HYPERTENSION: ICD-10-CM

## 2018-05-21 PROCEDURE — 99213 OFFICE O/P EST LOW 20 MIN: CPT | Performed by: INTERNAL MEDICINE

## 2018-05-21 NOTE — PROGRESS NOTES
Bariatric Medicine Follow Up  Chief Complaint   Patient presents with   • Weight Gain       History of Present Illness:   Jasiel Garrett is a 68 y.o. female who presents for weight management follow-up and to help address co-morbidities related to overweight, including hypertension, hyperlipidemia.    During the patient's last visit, the following were discussed and recommended:  Weight Goal: 3-5% wt loss each month (pt goal weight is 175 lb)  Diet: Premier protein meal replacement for breakfast  Can skip late morning protein snack if too close to lunch and not hungry  High-protein/low-carb lunch and dinner  Robard snack bar after dinner and high-protein snack midafternoon  Continue 64+ ounces water per day  Physical Activity: Continue elliptical, recumbent bike, walking 40 minutes at least 3 days per week, goal is daily  Risk level for moderate/vigorous exercise program: Low  New Rx: None  Senna OTC for constipation  Consider increasing losartan if blood pressure remains elevated.  Side Effects: Will review consent if applicable.  Behavior change: Tracking, mindful eating, stimulus narrowing  Follow-up: One month per patient request  IBT visit next week.  Recheck BMP, lipids 7/2018    The patient is really happy with her progress so far.  She has lost another 7 pounds, 10 altogether since starting the program.  Her blood pressure is better controlled.  Her waist circumference is down.  She had difficulty with low energy the first 2 weeks, now feels much improved and her energy level has increased.  She has felt more like exercising.  She is still having some constipation, has been a problem since her hypothyroidism was diagnosed.  MiraLAX on occasion helps, also senna.    Continues to have Premier protein for breakfast, Robard snack bar after dinner.  Not feeling hungry.  Feels very satisfied and very happy with the program.    Blood pressure controlled on losartan.  Continues vitamin D repletion.  Not currently on a  "statin.    Exercise:   40 minutes on elliptical or bike 3 times per week     Review of Systems   Denies lightheadedness, fatigue.  Constipation controlled.  All other ROS were reviewed and are otherwise unchanged from my previous visit with patient.    Physical Exam:    /82   Pulse 79   Ht 1.676 m (5' 6\")   Wt 94.6 kg (208 lb 9.6 oz)   SpO2 95%   BMI 33.67 kg/m²   Waist: 40.5 in  Body fat % 49.7  REE 1518 kcal/day    Weight change since last visit: -7.4 lb (-9.4 lb total)  Waist Circum change since last visit: -1.5 in (-3.5 in total)    Constitutional: Oriented to person, place, and time and well-developed, well-nourished, and in no distress.    Head: Normocephalic.   Musculoskeletal: Normal range of motion. No edema.   Neurological: Alert and oriented to person, place, and time. No muscle weakness.  Gait normal.   Skin: Warm and dry. Not diaphoretic.   Psychiatric: Mood, memory, affect and judgment normal.       ASSESSMENT/PLAN:  Body mass index is 33.67 kg/m².    Obesity Stage ([place]): 1; Class 1    1. Obesity (BMI 30-39.9)     2. Essential hypertension     3. Mixed hyperlipidemia       The patient continues to make positive change.  Responding well to stimulus narrowing, mindful eating.  Blood pressure under much better control.  Recheck lipids after next visit.      The patient and I have discussed at length and agree to the following recommendations, which are all addressing the above diagnoses:    Weight Goal: 3-5% wt loss each month (pt goal weight is 175 lb)  Diet: Premier protein meal replacement for breakfast  Can skip late morning protein snack if too close to lunch and not hungry  High-protein/low-carb lunch and dinner  Robard snack bar after dinner and high-protein snack midafternoon  Continue 64+ ounces water per day  Physical Activity: Recumbent bike or elliptical ×40 minutes 3 times per week  Increase to 60 minutes after next visit  Risk level for moderate/vigorous exercise program: " Low  New Rx: None  Side Effects: Will review consent if applicable.  Behavior change: Mindful eating, stimulus narrowing  Follow-up: One month  Patient refusing dietitian visits.  Recheck BMP, lipids 7/2018.      Patient's body mass index is 33.67 kg/m². Exercise and nutrition counseling were performed at this visit.

## 2018-06-15 ENCOUNTER — HOSPITAL ENCOUNTER (OUTPATIENT)
Dept: LAB | Facility: MEDICAL CENTER | Age: 68
End: 2018-06-15
Attending: INTERNAL MEDICINE
Payer: MEDICARE

## 2018-06-15 DIAGNOSIS — E05.90 HYPERTHYROIDISM: ICD-10-CM

## 2018-06-15 LAB
T3FREE SERPL-MCNC: 3.55 PG/ML (ref 2.4–4.2)
T4 FREE SERPL-MCNC: 0.94 NG/DL (ref 0.53–1.43)
TSH SERPL DL<=0.005 MIU/L-ACNC: 0.59 UIU/ML (ref 0.38–5.33)

## 2018-06-15 PROCEDURE — 84443 ASSAY THYROID STIM HORMONE: CPT

## 2018-06-15 PROCEDURE — 36415 COLL VENOUS BLD VENIPUNCTURE: CPT

## 2018-06-15 PROCEDURE — 84481 FREE ASSAY (FT-3): CPT

## 2018-06-15 PROCEDURE — 84439 ASSAY OF FREE THYROXINE: CPT

## 2018-06-25 ENCOUNTER — OFFICE VISIT (OUTPATIENT)
Dept: HEALTH INFORMATION MANAGEMENT | Facility: MEDICAL CENTER | Age: 68
End: 2018-06-25
Payer: MEDICARE

## 2018-06-25 VITALS
HEART RATE: 75 BPM | OXYGEN SATURATION: 96 % | BODY MASS INDEX: 32.87 KG/M2 | WEIGHT: 204.5 LBS | HEIGHT: 66 IN | DIASTOLIC BLOOD PRESSURE: 84 MMHG | SYSTOLIC BLOOD PRESSURE: 124 MMHG

## 2018-06-25 DIAGNOSIS — K59.01 SLOW TRANSIT CONSTIPATION: ICD-10-CM

## 2018-06-25 DIAGNOSIS — I10 ESSENTIAL HYPERTENSION: ICD-10-CM

## 2018-06-25 DIAGNOSIS — E66.9 OBESITY (BMI 30-39.9): ICD-10-CM

## 2018-06-25 DIAGNOSIS — E05.90 HYPERTHYROIDISM: ICD-10-CM

## 2018-06-25 DIAGNOSIS — E78.2 MIXED HYPERLIPIDEMIA: ICD-10-CM

## 2018-06-25 PROCEDURE — 99214 OFFICE O/P EST MOD 30 MIN: CPT | Performed by: INTERNAL MEDICINE

## 2018-06-25 NOTE — PROGRESS NOTES
"Bariatric Medicine Follow Up  Chief Complaint   Patient presents with   • Weight Gain       History of Present Illness:   Jasiel Garrett is a 68 y.o. female who presents for weight management follow-up and to help address co-morbidities related to overweight, including hyperlipidemia, hypertension.    During the patient's last visit, the following were discussed and recommended:  Weight Goal: 3-5% wt loss each month (pt goal weight is 175 lb)  Diet: Premier protein meal replacement for breakfast  Can skip late morning protein snack if too close to lunch and not hungry  High-protein/low-carb lunch and dinner  Robard snack bar after dinner and high-protein snack midafternoon  Continue 64+ ounces water per day  Physical Activity: Recumbent bike or elliptical ×40 minutes 3 times per week  Increase to 60 minutes after next visit  Risk level for moderate/vigorous exercise program: Low  New Rx: None  Side Effects: Will review consent if applicable.  Behavior change: Mindful eating, stimulus narrowing  Follow-up: One month  Patient refusing dietitian visits.  Recheck BMP, lipids 7/2018    Digestion much improved.   BMs normal. Feels much better.  Patient very happy with her progress, even if she has not lost a lot of weight yet.    PP am.  High protein bar after dinner.  Having some more fruit, but usually having berries with some grapes and a small amount of orange with her sparkling water instead of having wine.    Reduced BP med dosing.  Blood pressure much better controlled.  Not on statin.  No recent lipid profile.  Continues methimazole, followed by endocrinology.  Constipation resolved.    Exercise:   Recumbent bike or elliptical ×40 minutes 3 times per week     Review of Systems   Normal BMs, sleeping better.  Denies lightheadedness, increasing fatigue.  All other ROS were reviewed and are otherwise unchanged from my previous visit with patient.    Physical Exam:    /84   Pulse 75   Ht 1.676 m (5' 6\")   Wt " 92.8 kg (204 lb 8 oz)   SpO2 96%   BMI 33.01 kg/m²   Waist: 39.7 in  Body fat % 49  REE 1501 kcal/day    Weight change since last visit: -4 lb (-14 lb total)  Waist Circum change since last visit: -1 in (-5 in total)    Constitutional: Oriented to person, place, and time and well-developed, well-nourished, and in no distress.    Head: Normocephalic.   Musculoskeletal: Normal range of motion. No edema.   Neurological: Alert and oriented to person, place, and time. No muscle weakness.  Gait normal.   Skin: Warm and dry. Not diaphoretic.   Psychiatric: Mood, memory, affect and judgment normal.     Laboratory:   Recent labs reviewed.  TFTs normal 6/15/18      ASSESSMENT/PLAN:  Body mass index is 33.01 kg/m².    Obesity Stage ([place]): 1; Class 1    1. Mixed hyperlipidemia  LIPID PROFILE   2. Essential hypertension  BASIC METABOLIC PANEL   3. Obesity (BMI 30-39.9)  BASIC METABOLIC PANEL    LIPID PROFILE   4. Hyperthyroidism     5. Slow transit constipation       The patient overall much improved.  Continue stimulus narrowing, portion control.  Thyroid function stable.  Constipation resolved.  Blood pressure under much better control with reduced antihypertensive medication requirements.  Recheck lipids at next visit.    The patient and I have discussed at length and agree to the following recommendations, which are all addressing the above diagnoses:    Weight Goal: 3-5% wt loss each month (pt goal weight is 175 lb)  Diet: Premier protein meal replacement for breakfast  Can skip late morning protein snack if too close to lunch and not hungry  High-protein/low-carb lunch and dinner  Robard snack bar after dinner  Continue 64+ ounces water per day  Physical Activity:  Recumbent bike or elliptical ×40 minutes 3 times per week  Risk level for moderate/vigorous exercise program: Low  New Rx: None  Side Effects: Will review consent if applicable.  Behavior change: Continue mindful eating, portion control, stimulus  narrowing  Follow-up: One month  Patient not going to IBT as per her request.    Patient's body mass index is 33.01 kg/m². Exercise and nutrition counseling were performed at this visit.

## 2018-07-13 ENCOUNTER — HOSPITAL ENCOUNTER (OUTPATIENT)
Dept: LAB | Facility: MEDICAL CENTER | Age: 68
End: 2018-07-13
Attending: INTERNAL MEDICINE
Payer: MEDICARE

## 2018-07-13 DIAGNOSIS — E66.9 OBESITY (BMI 30-39.9): ICD-10-CM

## 2018-07-13 DIAGNOSIS — E05.90 HYPERTHYROIDISM: ICD-10-CM

## 2018-07-13 DIAGNOSIS — E78.2 MIXED HYPERLIPIDEMIA: ICD-10-CM

## 2018-07-13 DIAGNOSIS — I10 ESSENTIAL HYPERTENSION: ICD-10-CM

## 2018-07-13 LAB
ANION GAP SERPL CALC-SCNC: 8 MMOL/L (ref 0–11.9)
BUN SERPL-MCNC: 19 MG/DL (ref 8–22)
CALCIUM SERPL-MCNC: 9.6 MG/DL (ref 8.5–10.5)
CHLORIDE SERPL-SCNC: 107 MMOL/L (ref 96–112)
CHOLEST SERPL-MCNC: 190 MG/DL (ref 100–199)
CO2 SERPL-SCNC: 26 MMOL/L (ref 20–33)
CREAT SERPL-MCNC: 0.9 MG/DL (ref 0.5–1.4)
GLUCOSE SERPL-MCNC: 102 MG/DL (ref 65–99)
HDLC SERPL-MCNC: 51 MG/DL
LDLC SERPL CALC-MCNC: 120 MG/DL
POTASSIUM SERPL-SCNC: 4.4 MMOL/L (ref 3.6–5.5)
SODIUM SERPL-SCNC: 141 MMOL/L (ref 135–145)
T3FREE SERPL-MCNC: 3.3 PG/ML (ref 2.4–4.2)
T4 FREE SERPL-MCNC: 0.9 NG/DL (ref 0.53–1.43)
TRIGL SERPL-MCNC: 94 MG/DL (ref 0–149)
TSH SERPL DL<=0.005 MIU/L-ACNC: 1.04 UIU/ML (ref 0.38–5.33)

## 2018-07-13 PROCEDURE — 80048 BASIC METABOLIC PNL TOTAL CA: CPT

## 2018-07-13 PROCEDURE — 36415 COLL VENOUS BLD VENIPUNCTURE: CPT

## 2018-07-13 PROCEDURE — 80061 LIPID PANEL: CPT

## 2018-07-13 PROCEDURE — 84481 FREE ASSAY (FT-3): CPT

## 2018-07-13 PROCEDURE — 84439 ASSAY OF FREE THYROXINE: CPT

## 2018-07-13 PROCEDURE — 84443 ASSAY THYROID STIM HORMONE: CPT

## 2018-07-25 ENCOUNTER — TELEPHONE (OUTPATIENT)
Dept: MEDICAL GROUP | Facility: LAB | Age: 68
End: 2018-07-25

## 2018-07-25 NOTE — TELEPHONE ENCOUNTER
ESTABLISHED PATIENT PRE-VISIT PLANNING     Note: Patient will not be contacted if there is no indication to call.     1.  Reviewed notes from the last few office visits within the medical group: Yes    2.  If any orders were placed at last visit or intended to be done for this visit (i.e. 6 mos follow-up), do we have Results/Consult Notes?        •  Labs - Labs were not ordered at last office visit.       •  Imaging - Imaging was not ordered at last office visit.       •  Referrals - No referrals were ordered at last office visit.    3. Is this appointment scheduled as a Hospital Follow-Up? No    4.  Immunizations were updated in Epic using WebIZ?: Epic matches WebIZ       •  Web Iz Recommendations: TDAP and ZOSTAVAX (Shingles)    5.  Patient is due for the following Health Maintenance Topics:   Health Maintenance Due   Topic Date Due   • IMM DTaP/Tdap/Td Vaccine (1 - Tdap) 04/10/1970   • IMM ZOSTER VACCINES (1 of 2) 04/10/2001   • IMM PNEUMOCOCCAL 65+ (ADULT) LOW/MEDIUM RISK SERIES (1 of 2 - PCV13) 04/10/2016   • BONE DENSITY  08/31/2017       - Patient has completed FLU Immunization(s) per WebIZ. Chart has been updated.    6.  MDX printed for Provider? NO    7.  Patient was NOT informed to arrive 15 min prior to their scheduled appointment and bring in their medication bottles.

## 2018-07-26 ENCOUNTER — OFFICE VISIT (OUTPATIENT)
Dept: MEDICAL GROUP | Facility: LAB | Age: 68
End: 2018-07-26
Payer: MEDICARE

## 2018-07-26 VITALS
BODY MASS INDEX: 32.3 KG/M2 | WEIGHT: 201 LBS | HEIGHT: 66 IN | OXYGEN SATURATION: 96 % | TEMPERATURE: 97.1 F | HEART RATE: 84 BPM | RESPIRATION RATE: 12 BRPM | SYSTOLIC BLOOD PRESSURE: 132 MMHG | DIASTOLIC BLOOD PRESSURE: 82 MMHG

## 2018-07-26 DIAGNOSIS — Z78.0 POSTMENOPAUSAL ESTROGEN DEFICIENCY: ICD-10-CM

## 2018-07-26 DIAGNOSIS — Z00.00 MEDICARE ANNUAL WELLNESS VISIT, SUBSEQUENT: ICD-10-CM

## 2018-07-26 DIAGNOSIS — I10 ESSENTIAL HYPERTENSION: ICD-10-CM

## 2018-07-26 DIAGNOSIS — E78.2 MIXED HYPERLIPIDEMIA: ICD-10-CM

## 2018-07-26 DIAGNOSIS — M17.0 PRIMARY OSTEOARTHRITIS OF BOTH KNEES: ICD-10-CM

## 2018-07-26 DIAGNOSIS — Z23 NEED FOR VACCINATION: ICD-10-CM

## 2018-07-26 DIAGNOSIS — E05.90 HYPERTHYROIDISM: ICD-10-CM

## 2018-07-26 DIAGNOSIS — E66.9 OBESITY (BMI 30-39.9): ICD-10-CM

## 2018-07-26 DIAGNOSIS — E05.00 GRAVES DISEASE: ICD-10-CM

## 2018-07-26 DIAGNOSIS — Z12.31 SCREENING MAMMOGRAM, ENCOUNTER FOR: ICD-10-CM

## 2018-07-26 PROBLEM — K59.01 SLOW TRANSIT CONSTIPATION: Status: RESOLVED | Noted: 2017-08-01 | Resolved: 2018-07-26

## 2018-07-26 PROBLEM — Z91.81 RISK FOR FALLS: Status: RESOLVED | Noted: 2017-08-01 | Resolved: 2018-07-26

## 2018-07-26 PROCEDURE — G0009 ADMIN PNEUMOCOCCAL VACCINE: HCPCS | Performed by: FAMILY MEDICINE

## 2018-07-26 PROCEDURE — 99214 OFFICE O/P EST MOD 30 MIN: CPT | Mod: 25 | Performed by: FAMILY MEDICINE

## 2018-07-26 PROCEDURE — 90670 PCV13 VACCINE IM: CPT | Performed by: FAMILY MEDICINE

## 2018-07-26 RX ORDER — LOSARTAN POTASSIUM 25 MG/1
25 TABLET ORAL DAILY
Qty: 90 TAB | Refills: 3 | Status: SHIPPED | OUTPATIENT
Start: 2018-07-26 | End: 2019-08-08 | Stop reason: SDUPTHER

## 2018-07-26 ASSESSMENT — ACTIVITIES OF DAILY LIVING (ADL): BATHING_REQUIRES_ASSISTANCE: 0

## 2018-07-26 ASSESSMENT — PATIENT HEALTH QUESTIONNAIRE - PHQ9: CLINICAL INTERPRETATION OF PHQ2 SCORE: 0

## 2018-07-26 ASSESSMENT — ENCOUNTER SYMPTOMS: GENERAL WELL-BEING: GOOD

## 2018-07-26 NOTE — PROGRESS NOTES
Chief Complaint   Patient presents with   • Annual Exam         HPI:  Jasiel is a 68 y.o. here for Medicare Annual Wellness Visit     Patient Active Problem List    Diagnosis Date Noted   • Graves disease 04/05/2018   • Hyperthyroidism 07/14/2017   • Mixed hyperlipidemia 03/01/2017   • Essential hypertension 03/01/2017   • Obesity (BMI 30-39.9) 03/01/2017   • Primary osteoarthritis of both knees 03/01/2017       Current medicines including changes today:   Current Outpatient Prescriptions   Medication Sig Dispense Refill   • Zoster Vac Recomb Adjuvanted (SHINGRIX) 50 MCG Recon Susp 0.5 mL by Intramuscular route Once for 1 dose. 0.5 mL 1   • losartan (COZAAR) 25 MG Tab Take 1 Tab by mouth every day. 90 Tab 3   • methimazole (TAPAZOLE) 5 MG Tab Take 0.5 Tabs by mouth every 48 hours. 30 Tab 3   • MAGNESIUM PO Take  by mouth.     • aspirin (ASA) 81 MG Chew Tab chewable tablet Take 81 mg by mouth every day.     • ascorbic acid (ASCORBIC ACID) 500 MG Tab Take 500 mg by mouth every day.     • vitamin D (CHOLECALCIFEROL) 1000 UNIT Tab Take 1,000 Units by mouth every day.     • acetaminophen (TYLENOL) 500 MG Tab Take 500-1,000 mg by mouth every 6 hours as needed.     • non-formulary med Vibrant Detox Tablet - 3 tabs at night     • DIGESTIVE ENZYMES PO Take  by mouth.       No current facility-administered medications for this visit.         The patient reports adherence to this regimen   Current supplements as per medication list.   Chronic narcotic pain medicines: no     Allergies: Patient has no known allergies.    Current social contact/activities: , travels   Exercise: Bikes 40 mins TID    She  reports that she quit smoking about 21 years ago. She has a 10.00 pack-year smoking history. She has never used smokeless tobacco. She reports that she drinks about 1.2 oz of alcohol per week . She reports that she does not use drugs.  Counseling given: Not Answered        DPA/Advanced directive: Yes - HAS POLST, NO  ADVANCED DIRECTIVE    ROS:    Gait: Uses no assistive device   Ostomy: no   Other tubes: no   Amputations: no   Chronic oxygen use no   Last eye exam yearly   : Denies incontinence.     Screening:  Depression Screening    Little interest or pleasure in doing things?  0 - not at all  Feeling down, depressed , or hopeless? 0 - not at all  Patient Health Questionnaire Score: 0     If depressive symptoms identified deferred to follow up visit unless specifically addressed in assessment and plan.    Interpretation of PHQ-9 Total Score   Score Severity   1-4 No Depression   5-9 Mild Depression   10-14 Moderate Depression   15-19 Moderately Severe Depression   20-27 Severe Depression    Screening for Cognitive Impairment    Three Minute Recall (leader, season, table) 3/3    Thor clock face with all 12 numbers and set the hands to show 10 past 11.  Yes    Cognitive concerns identified deferred for follow up unless specifically addressed in assessment and plan.    Fall Risk Assessment    Has the patient had two or more falls in the last year or any fall with injury in the last year?  No    Safety Assessment    Throw rugs on floor.  No  Handrails on all stairs.  Yes  Good lighting in all hallways.  Yes  Difficulty hearing.  No  Patient counseled about all safety risks that were identified.    Functional Assessment ADLs    Are there any barriers preventing you from cooking for yourself or meeting nutritional needs?  No.    Are there any barriers preventing you from driving safely or obtaining transportation?  No.    Are there any barriers preventing you from using a telephone or calling for help?  No.    Are there any barriers preventing you from shopping?  No.    Are there any barriers preventing you from taking care of your own finances?  No.    Are there any barriers preventing you from managing your medications?  No.    Are there any barriers preventing you from showering, bathing or dressing yourself?  No.    Are you  "currently engaging in any exercise or physical activity?  Yes.     What is your perception of your health?  Good.      Health Maintenance Summary                IMM DTaP/Tdap/Td Vaccine Overdue 4/10/1970     IMM ZOSTER VACCINES Overdue 4/10/2001     IMM PNEUMOCOCCAL 65+ (ADULT) LOW/MEDIUM RISK SERIES Overdue 4/10/2016     BONE DENSITY Overdue 8/31/2017      Done 8/31/2012 DS-BONE DENSITY STUDY (DEXA)    Annual Wellness Visit Next Due 8/2/2018      Done 8/1/2017 Visit Dx: Medicare annual wellness visit, initial    IMM INFLUENZA Next Due 9/1/2018      Done 10/4/2017 Imm Admin: Influenza Vaccine Quad Inj (Pf)     Patient has more history with this topic...    MAMMOGRAM Next Due 5/9/2019      Done 5/9/2017 MA-MAMMO SCREENING BILAT W/TOMOSYNTHESIS W/CAD     Patient has more history with this topic...    COLONOSCOPY Next Due 2/20/2026      Done 2/20/2016 REFERRAL TO GI FOR COLONOSCOPY     Patient has more history with this topic...          Patient Care Team:  Pia Dominguez M.D. as PCP - General (Family Medicine)  Denny Ireland M.D. (Endocrinology)        Social History   Substance Use Topics   • Smoking status: Former Smoker     Packs/day: 0.50     Years: 20.00     Quit date: 3/1/1997   • Smokeless tobacco: Never Used   • Alcohol use 1.2 oz/week     2 Glasses of wine per week     Family History   Problem Relation Age of Onset   • Arthritis Mother    • Heart Disease Father         arteriosclerosis   • Alcohol/Drug Sister    • Alcohol/Drug Brother      She  has a past medical history of Hyperlipidemia; Hypertension; and Hyperthyroidism. She also has no past medical history of Encounter for long-term (current) use of other medications.   Past Surgical History:   Procedure Laterality Date   • HYSTEROSCOPY WITH VIDEO DIAGNOSTIC             Exam:     Blood pressure 132/82, pulse 84, temperature 36.2 °C (97.1 °F), resp. rate 12, height 1.676 m (5' 6\"), weight 91.2 kg (201 lb), SpO2 96 %. Body mass index is 32.44 " kg/m².    Hearing good.    Dentition good  Alert, oriented in no acute distress.  Eye contact is good, speech goal directed, affect calm  CV: RRR no m/r/g  Pulm: CTAB    Assessment and Plan. The following treatment and monitoring plan is recommended:      1. Medicare annual wellness visit, subsequent  Age-appropriate counseling given   HRA reviewed  HCM updated with mammogram, PCV 13, shingrix, DEXA  Information for advanced directive given today, she does have a POLST form    2. Obesity (BMI 30-39.9)  Chronic, improving with a 15 pound weight loss over the last several months.  She is having her thyroid treated and is working with Dr. Cornell    3. Hyperthyroidism  Chronic, stable and following with endocrinology.  Continues methimazole    4. Screening mammogram, encounter for  - MA-SCREEN MAMMO W/CAD-BILAT; Future    5. Need for vaccination  - PNEUMOCOCCAL CONJUGATE VACCINE 13-VALENT  - Zoster Vac Recomb Adjuvanted (SHINGRIX) 50 MCG Recon Susp; 0.5 mL by Intramuscular route Once for 1 dose.  Dispense: 0.5 mL; Refill: 1    6. Postmenopausal estrogen deficiency  Chronic, stable and has not had bone density scan  - DS-BONE DENSITY STUDY (DEXA); Future    7. Essential hypertension  Chronic, elevated initially here today, improved on recheck.  She will continue losartan 25 mg daily  - losartan (COZAAR) 25 MG Tab; Take 1 Tab by mouth every day.  Dispense: 90 Tab; Refill: 3    8. Mixed hyperlipidemia  Chronic, stable, labs reviewed from this month.  Not currently on medications    9. Graves disease  Chronic, stable and following with Dr. Ireland, on methimazole    10. Primary osteoarthritis of both knees  Chronic, stable and improving with more exercise        Services needed: None  Health Care Screening recommendations as per orders if indicated.  Referrals offered: PT/OT/Nutrition counseling/Behavioral Health/Smoking cessation as per orders if indicated.    Discussion today about general wellness and lifestyle  habits:    · Prevent falls and reduce trip hazards; Cautioned about securing or removing rugs.  · Have a working fire alarm and carbon monoxide detector;   · Engage in regular physical activity and social activities        Follow-up: Return in about 6 months (around 1/26/2019) for HTN.  5 YEAR PLAN:  Flu vaccine advised every year.  Colon cancer screening per GI recommendation .

## 2018-07-31 ENCOUNTER — OFFICE VISIT (OUTPATIENT)
Dept: HEALTH INFORMATION MANAGEMENT | Facility: MEDICAL CENTER | Age: 68
End: 2018-07-31
Payer: MEDICARE

## 2018-07-31 VITALS
SYSTOLIC BLOOD PRESSURE: 122 MMHG | BODY MASS INDEX: 32.69 KG/M2 | HEIGHT: 66 IN | WEIGHT: 203.4 LBS | HEART RATE: 79 BPM | DIASTOLIC BLOOD PRESSURE: 78 MMHG | OXYGEN SATURATION: 96 %

## 2018-07-31 DIAGNOSIS — I10 ESSENTIAL HYPERTENSION: ICD-10-CM

## 2018-07-31 DIAGNOSIS — R73.9 HYPERGLYCEMIA: ICD-10-CM

## 2018-07-31 DIAGNOSIS — E66.9 OBESITY (BMI 30-39.9): ICD-10-CM

## 2018-07-31 DIAGNOSIS — E78.2 MIXED HYPERLIPIDEMIA: ICD-10-CM

## 2018-07-31 PROCEDURE — 99214 OFFICE O/P EST MOD 30 MIN: CPT | Performed by: INTERNAL MEDICINE

## 2018-07-31 NOTE — PROGRESS NOTES
Bariatric Medicine Follow Up  Chief Complaint   Patient presents with   • Weight Gain       History of Present Illness:   Jasiel Garrett is a 68 y.o. female who presents for weight management follow-up and to help address co-morbidities related to overweight, including HTN, HLD.    During the patient's last visit, the following were discussed and recommended:  Weight Goal: 3-5% wt loss each month (pt goal weight is 175 lb)  Diet: Premier protein meal replacement for breakfast  Can skip late morning protein snack if too close to lunch and not hungry  High-protein/low-carb lunch and dinner  Robard snack bar after dinner  Continue 64+ ounces water per day  Physical Activity:  Recumbent bike or elliptical ×40 minutes 3 times per week  Risk level for moderate/vigorous exercise program: Low  New Rx: None  Side Effects: Will review consent if applicable.  Behavior change: Continue mindful eating, portion control, stimulus narrowing  Follow-up: One month  Patient not going to IBT as per her request.    Still very pleased with her progress.  By her scale and at her primary care physician's, pt is down to 201 pounds, which is a 3 pound weight loss since her last visit.      She is still using a Premier protein meal replacement for breakfast, Robard snack bar after dinner.  Really wants to stay on track, is even trying to eat well with company visiting, family trips.  Feels much happier.    Sticking to the same diet as previous.  Watching CHO intake.    No problems with constipation or abd pain.  Blood pressure well controlled on losartan.  Continues vitamin D repletion.  Not on a statin, with last LDL slightly high.  Blood glucose under much better control on most recent labs, near normal.  Not on glucose lowering agent.    Exercise:   Added wts on alternate days  Still using recumbent bike, elliptical x 40 min 3/wk     Review of Systems   Denies constipation, lightheadedness, worsening fatigue.  Overall energy level good.  All  "other ROS were reviewed and are otherwise unchanged from my previous visit with patient.    Physical Exam:    /78   Pulse 79   Ht 1.676 m (5' 6\")   Wt 92.3 kg (203 lb 6.4 oz)   SpO2 96%   BMI 32.83 kg/m²   Waist: 39 in  Body fat % 47.9  REE 1495 kcal/day    Weight change since last visit: -1 lb (-15 lb total)  Waist Circum change since last visit: -1 in (-5 in total)    Constitutional: Oriented to person, place, and time and well-developed, well-nourished, and in no distress.    Head: Normocephalic.   Musculoskeletal: Normal range of motion. No edema.   Neurological: Alert and oriented to person, place, and time. No muscle weakness.  Gait normal.   Skin: Warm and dry. Not diaphoretic.   Psychiatric: Mood, memory, affect and judgment normal.     Laboratory:   Recent labs reviewed.      ASSESSMENT/PLAN:  Body mass index is 32.83 kg/m².    Obesity Stage ([place]): 1; Class 1    1. Obesity (BMI 30-39.9)     2. Essential hypertension     3. Mixed hyperlipidemia     4. Hyperglycemia       The patient continues to make great progress.  She feels much improved, blood glucose under much better control.  LDL still somewhat high but should improve with further weight loss.  We will continue to monitor lipids, blood glucose.  Blood pressure well controlled.  Mood appears much improved as well.  Continue stimulus narrowing, CHO reduction.    The patient and I have discussed at length and agree to the following recommendations, which are all addressing the above diagnoses:    Weight Goal: 3-5% wt loss each month (pt goal weight is 175 lb)  Diet: High Protein/Low Carb Meals and 2 snacks between meals daily  Premier protein for breakfast, Robard snack bar after dinner  >100 g protein, <100 g total carbs daily  64+ oz water per day  Avoid sweet drinks and sodas  Track daily intake for next visit if possible  Physical Activity: Increase recumbent bike, weights as possible, currently at 40 minutes 3 times per week  Risk " level for moderate/vigorous exercise program: Low  New Rx: None  Behavior change: Continue mindful eating, stimulus narrowing, planning  Follow-up: One month  Not seeing RD.    Patient's body mass index is 32.83 kg/m². Exercise and nutrition counseling were performed at this visit.

## 2018-08-07 ENCOUNTER — HOSPITAL ENCOUNTER (OUTPATIENT)
Dept: LAB | Facility: MEDICAL CENTER | Age: 68
End: 2018-08-07
Attending: INTERNAL MEDICINE
Payer: MEDICARE

## 2018-08-07 LAB
T3FREE SERPL-MCNC: 3.37 PG/ML (ref 2.4–4.2)
T4 FREE SERPL-MCNC: 0.95 NG/DL (ref 0.53–1.43)
TSH SERPL DL<=0.005 MIU/L-ACNC: 0.35 UIU/ML (ref 0.38–5.33)

## 2018-08-07 PROCEDURE — 84481 FREE ASSAY (FT-3): CPT

## 2018-08-07 PROCEDURE — 36415 COLL VENOUS BLD VENIPUNCTURE: CPT

## 2018-08-07 PROCEDURE — 84439 ASSAY OF FREE THYROXINE: CPT

## 2018-08-07 PROCEDURE — 84443 ASSAY THYROID STIM HORMONE: CPT

## 2018-08-14 ENCOUNTER — OFFICE VISIT (OUTPATIENT)
Dept: ENDOCRINOLOGY | Facility: MEDICAL CENTER | Age: 68
End: 2018-08-14
Payer: MEDICARE

## 2018-08-14 VITALS
DIASTOLIC BLOOD PRESSURE: 96 MMHG | RESPIRATION RATE: 16 BRPM | BODY MASS INDEX: 32.78 KG/M2 | WEIGHT: 204 LBS | HEIGHT: 66 IN | OXYGEN SATURATION: 94 % | SYSTOLIC BLOOD PRESSURE: 150 MMHG | HEART RATE: 95 BPM

## 2018-08-14 DIAGNOSIS — E05.90 HYPERTHYROIDISM: ICD-10-CM

## 2018-08-14 PROCEDURE — 99213 OFFICE O/P EST LOW 20 MIN: CPT | Performed by: INTERNAL MEDICINE

## 2018-08-14 NOTE — PROGRESS NOTES
Chief Complaint   Patient presents with   • Thyrotoxicosis     Graves' disease        HPI:    Thyrotoxicosis          Clinically euthyroid taking methimazole 2.5 mg every other day. TSH is low normal 0.3 and free thyroxine mid range at 0.9 and free T3 upper normal at 3.3 (2.4-4.2). She still has positive thyrotropin receptor antibody as of May. I can't tell if she is in remission and she would like to find out.    Obesity         Her current program is working wonderfully. She has lost 5 pounds in the last 3 months. She wonders how she would be if her thyroid levels came up a little bit. She understands that she might not be in remission and if she did not take methimazole she will relapse. This is not predictable so we will try without methimazole. She will monitor levels monthly    ROS:   All other systems reported as negative or unchanged since last exam      Allergies: No Known Allergies    Current medicines including changes today:  Current Outpatient Prescriptions   Medication Sig Dispense Refill   • losartan (COZAAR) 25 MG Tab Take 1 Tab by mouth every day. 90 Tab 3   • methimazole (TAPAZOLE) 5 MG Tab Take 0.5 Tabs by mouth every 48 hours. 30 Tab 3   • MAGNESIUM PO Take 250 mg by mouth every day.     • DIGESTIVE ENZYMES PO Take  by mouth.     • aspirin (ASA) 81 MG Chew Tab chewable tablet Take 81 mg by mouth every day.     • ascorbic acid (ASCORBIC ACID) 500 MG Tab Take 500 mg by mouth every day.     • vitamin D (CHOLECALCIFEROL) 1000 UNIT Tab Take 1,000 Units by mouth every day.     • acetaminophen (TYLENOL) 500 MG Tab Take 500-1,000 mg by mouth every 6 hours as needed.     • non-formulary med Vibrant Detox Tablet - 3 tabs at night       No current facility-administered medications for this visit.         Past Medical History:   Diagnosis Date   • Hyperlipidemia    • Hypertension    • Hyperthyroidism     Graves' disease / thyrotropin receptor antibody = 6.2       PHYSICAL EXAM:    /96   Pulse 95    "Resp 16   Ht 1.676 m (5' 6\")   Wt 92.5 kg (204 lb)   SpO2 94%   BMI 32.93 kg/m²       Gen.   appears healthy     Skin   appropriate for sex and age    HEENT   no loss of myopathy    Neck   I cannot palpate her thyroid gland. I think it is mostly substernal.    Heart  regular    Extremities  no edema    Neuro  gait and station normal, no tremor    Psych  appropriate, calm, pleasant    ASSESSMENT AND RECOMMENDATIONS    1. Hyperthyroidism, Graves' disease              Trial off of methimazole.              Monitor her thyroid blood levels monthly and report to my chart      - FREE THYROXINE; Standing  - T3 FREE; Standing  - TSH; Standing      DISPOSITION: Follow-up in 3 months       Denny Ireland M.D.    Copies to: Pia Dominguez M.D. [phone]  "

## 2018-08-22 ENCOUNTER — TELEPHONE (OUTPATIENT)
Dept: ENDOCRINOLOGY | Facility: MEDICAL CENTER | Age: 68
End: 2018-08-22

## 2018-08-22 NOTE — TELEPHONE ENCOUNTER
1. Caller Name: Denny Garrett             Call Back Number: [phone] (home)         Patient approves a detailed voicemail message: yes    Patient's  called and lvm asking if it is ok for her to start taking the medication Celebrex. They want to make sure it won't interfere with her thyroid.    Please advise

## 2018-08-29 ENCOUNTER — HOSPITAL ENCOUNTER (OUTPATIENT)
Dept: RADIOLOGY | Facility: MEDICAL CENTER | Age: 68
End: 2018-08-29
Attending: FAMILY MEDICINE
Payer: MEDICARE

## 2018-08-29 DIAGNOSIS — Z78.0 POSTMENOPAUSAL ESTROGEN DEFICIENCY: ICD-10-CM

## 2018-08-29 DIAGNOSIS — Z12.31 SCREENING MAMMOGRAM, ENCOUNTER FOR: ICD-10-CM

## 2018-08-29 PROCEDURE — 77080 DXA BONE DENSITY AXIAL: CPT

## 2018-08-29 PROCEDURE — 77067 SCR MAMMO BI INCL CAD: CPT

## 2018-08-31 ENCOUNTER — HOSPITAL ENCOUNTER (OUTPATIENT)
Dept: RADIOLOGY | Facility: MEDICAL CENTER | Age: 68
End: 2018-08-31
Attending: FAMILY MEDICINE
Payer: MEDICARE

## 2018-08-31 DIAGNOSIS — R92.8 ABNORMAL MAMMOGRAM: ICD-10-CM

## 2018-08-31 PROCEDURE — 77065 DX MAMMO INCL CAD UNI: CPT | Mod: LT

## 2018-08-31 PROCEDURE — 76642 ULTRASOUND BREAST LIMITED: CPT | Mod: LT

## 2018-09-14 ENCOUNTER — HOSPITAL ENCOUNTER (OUTPATIENT)
Dept: LAB | Facility: MEDICAL CENTER | Age: 68
End: 2018-09-14
Attending: INTERNAL MEDICINE
Payer: MEDICARE

## 2018-09-14 PROCEDURE — 84443 ASSAY THYROID STIM HORMONE: CPT

## 2018-09-14 PROCEDURE — 84439 ASSAY OF FREE THYROXINE: CPT

## 2018-09-14 PROCEDURE — 84481 FREE ASSAY (FT-3): CPT

## 2018-09-14 PROCEDURE — 36415 COLL VENOUS BLD VENIPUNCTURE: CPT

## 2018-09-15 LAB
T3FREE SERPL-MCNC: 3.4 PG/ML (ref 2.4–4.2)
T4 FREE SERPL-MCNC: 1.13 NG/DL (ref 0.53–1.43)
TSH SERPL DL<=0.005 MIU/L-ACNC: 0.16 UIU/ML (ref 0.38–5.33)

## 2018-10-15 ENCOUNTER — HOSPITAL ENCOUNTER (OUTPATIENT)
Dept: LAB | Facility: MEDICAL CENTER | Age: 68
End: 2018-10-15
Attending: INTERNAL MEDICINE
Payer: MEDICARE

## 2018-10-15 ENCOUNTER — NON-PROVIDER VISIT (OUTPATIENT)
Dept: MEDICAL GROUP | Facility: LAB | Age: 68
End: 2018-10-15
Payer: MEDICARE

## 2018-10-15 DIAGNOSIS — Z23 NEED FOR VACCINATION: Primary | ICD-10-CM

## 2018-10-15 PROCEDURE — 36415 COLL VENOUS BLD VENIPUNCTURE: CPT

## 2018-10-15 PROCEDURE — 84443 ASSAY THYROID STIM HORMONE: CPT

## 2018-10-15 PROCEDURE — 84481 FREE ASSAY (FT-3): CPT

## 2018-10-15 PROCEDURE — 84439 ASSAY OF FREE THYROXINE: CPT

## 2018-10-15 PROCEDURE — 90686 IIV4 VACC NO PRSV 0.5 ML IM: CPT | Performed by: NURSE PRACTITIONER

## 2018-10-15 PROCEDURE — G0008 ADMIN INFLUENZA VIRUS VAC: HCPCS | Performed by: NURSE PRACTITIONER

## 2018-10-15 NOTE — PROGRESS NOTES
I have placed the below orders and discussed them with Dr. Dominguez. the MA is performing the below orders under the direction of Dr. KIM

## 2018-10-15 NOTE — PROGRESS NOTES
"Jasiel Garrett is a 68 y.o. female here for a non-provider visit for:   FLU    Reason for immunization: Annual Flu Vaccine  Immunization records indicate need for vaccine: Yes, confirmed with Epic  Minimum interval has been met for this vaccine: Yes  ABN completed: Not Indicated    Order and dose verified by:   VIS Dated  8/7/15 was given to patient: Yes  All IAC Questionnaire questions were answered \"No.\"    Patient tolerated injection and no adverse effects were observed or reported: Yes    Pt scheduled for next dose in series: Not Indicated  "

## 2018-10-16 LAB
T3FREE SERPL-MCNC: 3.82 PG/ML (ref 2.4–4.2)
T4 FREE SERPL-MCNC: 1.09 NG/DL (ref 0.53–1.43)
TSH SERPL DL<=0.005 MIU/L-ACNC: 0.21 UIU/ML (ref 0.38–5.33)

## 2018-11-05 ENCOUNTER — TELEPHONE (OUTPATIENT)
Dept: ENDOCRINOLOGY | Facility: MEDICAL CENTER | Age: 68
End: 2018-11-05

## 2018-11-05 DIAGNOSIS — E05.90 HYPERTHYROIDISM: Primary | ICD-10-CM

## 2018-11-05 NOTE — TELEPHONE ENCOUNTER
1. Caller Name: Denny Garrett           Call Back Number: [phone] (home)               Patient approves a detailed voicemail message: N\A      Patient's  called stating patient needs lab orders placed for her upcoming appt.

## 2018-11-20 ENCOUNTER — HOSPITAL ENCOUNTER (OUTPATIENT)
Dept: LAB | Facility: MEDICAL CENTER | Age: 68
End: 2018-11-20
Attending: INTERNAL MEDICINE
Payer: MEDICARE

## 2018-11-20 DIAGNOSIS — E05.90 HYPERTHYROIDISM: ICD-10-CM

## 2018-11-20 LAB
T3FREE SERPL-MCNC: 4.01 PG/ML (ref 2.4–4.2)
T4 FREE SERPL-MCNC: 1.1 NG/DL (ref 0.53–1.43)
TSH SERPL DL<=0.005 MIU/L-ACNC: 0.14 UIU/ML (ref 0.38–5.33)

## 2018-11-20 PROCEDURE — 36415 COLL VENOUS BLD VENIPUNCTURE: CPT

## 2018-11-20 PROCEDURE — 84481 FREE ASSAY (FT-3): CPT

## 2018-11-20 PROCEDURE — 84443 ASSAY THYROID STIM HORMONE: CPT

## 2018-11-20 PROCEDURE — 84439 ASSAY OF FREE THYROXINE: CPT

## 2018-11-27 ENCOUNTER — OFFICE VISIT (OUTPATIENT)
Dept: ENDOCRINOLOGY | Facility: MEDICAL CENTER | Age: 68
End: 2018-11-27
Payer: MEDICARE

## 2018-11-27 VITALS
SYSTOLIC BLOOD PRESSURE: 136 MMHG | BODY MASS INDEX: 34.16 KG/M2 | OXYGEN SATURATION: 95 % | HEART RATE: 110 BPM | DIASTOLIC BLOOD PRESSURE: 100 MMHG | RESPIRATION RATE: 16 BRPM | WEIGHT: 205 LBS | HEIGHT: 65 IN

## 2018-11-27 DIAGNOSIS — E05.90 HYPERTHYROIDISM: ICD-10-CM

## 2018-11-27 PROCEDURE — 99213 OFFICE O/P EST LOW 20 MIN: CPT | Performed by: INTERNAL MEDICINE

## 2018-11-27 RX ORDER — CELECOXIB 200 MG/1
200 CAPSULE ORAL 2 TIMES DAILY
COMMUNITY
End: 2020-10-27

## 2018-11-27 NOTE — PROGRESS NOTES
Chief Complaint   Patient presents with   • Thyrotoxicosis     Graves' disease        HPI:    Thyrotoxicosis          Patient is feeling very well.  She has good energy.  She is in a regular exercise program and doing her best with diet as well.          She is currently taking methimazole 2.5 mg 2 days a week.  She is clinically euthyroid.           Current free T3 is upper normal at 4.0 (2.4-4.2) and free T4 mid range at 1.1 (0.5-1.4).           TSH is still somewhat suppressed at 0.1.            She is anticipating limited knee surgery with probably spinal anesthesia.  This will be done at a specialty clinic in [place] that comes highly recommended.  There will be some anesthesia and I think we should try to get her TSH closer to normal even though I feel she is basically euthyroid.  For 2 weeks prior to her surgery she will increase her methimazole to 2.5 mg daily.  She may go back to our current regimen after surgery.    ROS:  All other systems reported as negative or unchanged since last exam      Allergies: No Known Allergies    Current medicines including changes today:  Current Outpatient Prescriptions   Medication Sig Dispense Refill   • celecoxib (CELEBREX) 200 MG Cap Take 200 mg by mouth 2 times a day.     • Calcium Citrate (CITRACAL PO) Take  by mouth.     • losartan (COZAAR) 25 MG Tab Take 1 Tab by mouth every day. 90 Tab 3   • ascorbic acid (ASCORBIC ACID) 500 MG Tab Take 500 mg by mouth every day.     • vitamin D (CHOLECALCIFEROL) 1000 UNIT Tab Take 1,000 Units by mouth every day.     • methimazole (TAPAZOLE) 5 MG Tab Take 0.5 Tabs by mouth every 48 hours. (Patient not taking: Reported on 11/27/2018) 30 Tab 3   • non-formulary med Vibrant Detox Tablet - 3 tabs at night     • MAGNESIUM PO Take 250 mg by mouth every day.     • DIGESTIVE ENZYMES PO Take  by mouth.     • aspirin (ASA) 81 MG Chew Tab chewable tablet Take 81 mg by mouth every day.     • acetaminophen (TYLENOL) 500 MG Tab Take 500-1,000  "mg by mouth every 6 hours as needed.       No current facility-administered medications for this visit.         Past Medical History:   Diagnosis Date   • Hyperlipidemia    • Hypertension    • Hyperthyroidism     Graves' disease / thyrotropin receptor antibody = 6.2       PHYSICAL EXAM:    /100 (BP Location: Left arm, Patient Position: Sitting, BP Cuff Size: Large adult)   Pulse (!) 110   Resp 16   Ht 1.651 m (5' 5\")   Wt 93 kg (205 lb)   SpO2 95%   BMI 34.11 kg/m²   Heart rate during my examination is 95 and regular    Gen.   obese but otherwise appears healthy    Skin   appropriate for sex and age    HEENT no eye signs of Graves' disease    Neck   thyroid gland is difficult to palpate but about normal size    Heart  regular    Extremities  no edema    Neuro  gait and station normal, no tremor    Psych  appropriate, calm, pleasant    ASSESSMENT AND RECOMMENDATIONS    1. Hyperthyroidism, Graves' disease            Currently clinically euthyroid taking methimazole 2.5 mg twice weekly (see HPI)           Discussed adjusting methimazole prior to surgery  - FREE THYROXINE; Future  - T3 FREE; Future  - TSH; Future      DISPOSITION: She will rehab after her surgery in January.  I will see her again in February and update lab      Denny Ireland M.D.    Copies to: Pia Dominguez M.D. [phone]  "

## 2018-12-11 ENCOUNTER — OFFICE VISIT (OUTPATIENT)
Dept: MEDICAL GROUP | Facility: LAB | Age: 68
End: 2018-12-11
Payer: MEDICARE

## 2018-12-11 VITALS
HEART RATE: 93 BPM | BODY MASS INDEX: 34.66 KG/M2 | SYSTOLIC BLOOD PRESSURE: 132 MMHG | OXYGEN SATURATION: 96 % | WEIGHT: 208 LBS | HEIGHT: 65 IN | DIASTOLIC BLOOD PRESSURE: 88 MMHG | TEMPERATURE: 97.6 F

## 2018-12-11 DIAGNOSIS — R73.03 PREDIABETES: ICD-10-CM

## 2018-12-11 DIAGNOSIS — Z96.651 HISTORY OF RIGHT KNEE JOINT REPLACEMENT: ICD-10-CM

## 2018-12-11 DIAGNOSIS — Z01.818 PREOPERATIVE CLEARANCE: ICD-10-CM

## 2018-12-11 DIAGNOSIS — E66.9 OBESITY (BMI 30-39.9): ICD-10-CM

## 2018-12-11 DIAGNOSIS — M17.0 PRIMARY OSTEOARTHRITIS OF BOTH KNEES: ICD-10-CM

## 2018-12-11 PROCEDURE — 99215 OFFICE O/P EST HI 40 MIN: CPT | Performed by: FAMILY MEDICINE

## 2018-12-11 NOTE — PROGRESS NOTES
Subjective:   Jasiel Garrett is a 68 y.o. female here today for   Chief Complaint   Patient presents with   • Procedure     surgical clearance       1. Primary osteoarthritis of both knees  2. Preoperative clearance  New   Scheduled to have a right knee replacement on 1/14/19 at [facility] by Dr. Jakub Ro   Here for preop clearance  EKG today  METS - can walk more than 2 blocks and up stairs without SOB or CP, exercise tolerance related to joint pain, exercising to HR of 130s for 45 minutes daily   No known EKTA or CAD   No h/o issues with surgery in the past  Currently taking celebrex and ASA   No other blood thinners   Functionally independent  Last labs 7/2018    3. Obesity (BMI 30-39.9)  Chronic  Was seeing Dr. Monsivais, bariatric medicine, has stopped seeing her this summer    Weight is slowly increasing  Exercising regularly 45 mins of recumbent bike and the elliptical     4. Prediabetes  Chronic  Fasting labs have shown elevated glucose in the past  Last A1c 5.5%  Weight slightly increased over last 6 months    Current medicines (including changes today)  Current Outpatient Prescriptions   Medication Sig Dispense Refill   • NON SPECIFIED 4 wheel walker with seat 1 Each 0   • methimazole (TAPAZOLE) 5 MG Tab Take 0.5 Tabs by mouth every 48 hours. 30 Tab 3   • celecoxib (CELEBREX) 200 MG Cap Take 200 mg by mouth 2 times a day.     • Calcium Citrate (CITRACAL PO) Take  by mouth.     • losartan (COZAAR) 25 MG Tab Take 1 Tab by mouth every day. 90 Tab 3   • non-formulary med Vibrant Detox Tablet - 3 tabs at night     • MAGNESIUM PO Take 250 mg by mouth every day.     • DIGESTIVE ENZYMES PO Take  by mouth.     • aspirin (ASA) 81 MG Chew Tab chewable tablet Take 81 mg by mouth every day.     • ascorbic acid (ASCORBIC ACID) 500 MG Tab Take 500 mg by mouth every day.     • vitamin D (CHOLECALCIFEROL) 1000 UNIT Tab Take 1,000 Units by mouth every day.     • acetaminophen (TYLENOL) 500 MG Tab Take  "500-1,000 mg by mouth every 6 hours as needed.       No current facility-administered medications for this visit.      She  has a past medical history of Hyperlipidemia; Hypertension; and Hyperthyroidism. She also has no past medical history of Encounter for long-term (current) use of other medications.    ROS   No fevers  No bowel changes  No LE edema       Objective:     Blood pressure 132/88, pulse 93, temperature 36.4 °C (97.6 °F), temperature source Temporal, height 1.651 m (5' 5\"), weight 94.3 kg (208 lb), SpO2 96 %. Body mass index is 34.61 kg/m².   Physical Exam:  Constitutional: Alert, no distress.  Skin: Warm, dry, good turgor, no rashes in visible areas.  Eye: Equal, round and reactive, conjunctiva clear, lids normal.  ENMT: Lips without lesions, good dentition, oropharynx clear.  Neck: Trachea midline, no masses, no thyromegaly. No cervical or supraclavicular lymphadenopathy  Respiratory: Unlabored respiratory effort, lungs clear to auscultation, no wheezes, no ronchi.  Cardiovascular: Normal S1, S2, RRR, no murmur, no edema.  Abdomen: Soft, non-tender, no masses, no hepatosplenomegaly.  Psych: Alert and oriented x3, normal affect and mood.    EKG Interpretation    Interpreted by Pia Dominguez M.D.    Rhythm: normal sinus   Rate: normal  Axis: normal  Ectopy: none  Conduction: normal  ST Segments: normal  T Waves: normal  Q Waves: none    Clinical Impression: Normal sinus rhythm      Assessment and Plan:   The following treatment plan was discussed    1. Primary osteoarthritis of both knees  2. Preoperative clearance  Patient plans for right knee replacement in 1 month.  Call clearance today included EKG which was normal.  We discussed stopping her NSAID continuing her antihypertensives.  I reviewed her blood pressure at home and they are in the 130s over 80s.  She does have whitecoat hypertension.  She has never had issues with surgery in the past.  She has a low cardiovascular risk for the " surgery may proceed.  She will have labs done next week ordered a walker for her postop appointment.  - HEMOGLOBIN A1C; Future  - DME Walker  - NON SPECIFIED; 4 wheel walker with seat  Dispense: 1 Each; Refill: 0  - EKG; Future  - HEMOGLOBIN A1C; Future    3. Obesity (BMI 30-39.9)  Chronic, currently stable and is exercising.  Discussed high intensity interval training after the surgery  - HEMOGLOBIN A1C; Future    4. Prediabetes  Chronic, stable.  Recheck labs  - HEMOGLOBIN A1C; Future    5. History of right knee joint replacement  - DME Walker  - NON SPECIFIED; 4 wheel walker with seat  Dispense: 1 Each; Refill: 0        Followup: Return in about 6 weeks (around 1/22/2019), or if symptoms worsen or fail to improve, for postop wound check .        This note was created using voice recognition software. I have made every reasonable attempt to correct errors, however, I do anticipate some grammatical errors.     Total 40 minutes face-to-face time spent with patient not including any procedure, with greater than 50% of the total time discussing patient's issues and symptoms as listed above in assessment and plan, as well as managing coordination of care for future evaluation and treatment.            Face to Face Note  -  Durable Medical Equipment    Pia Dominguez M.D. - NPI: [id number]  I certify that this patient is under my care and that they had a durable medical equipment(DME)face to face encounter by myself that meets the physician DME face-to-face encounter requirements with this patient on:    Date of encounter:   Patient:                    MRN:                       YOB: 2017  Jasiel Garrett  [id number]  1950     The encounter with the patient was in whole, or in part, for the following medical condition, which is the primary reason for durable medical equipment:  Post-Op Surgery    I certify that, based on my findings, the following durable medical equipment is medically  necessary:  Walkers.    HOME O2 Saturation Measurements:(Values must be present for Home Oxygen orders)         ,     ,         My Clinical findings support the need for the above equipment due to:  Abnormal Gait    Supporting Symptoms: Plans for surgery on 1/14/19 and will need a walker

## 2018-12-13 ENCOUNTER — TELEPHONE (OUTPATIENT)
Dept: MEDICAL GROUP | Facility: LAB | Age: 68
End: 2018-12-13

## 2018-12-13 NOTE — TELEPHONE ENCOUNTER
[facility]   [phone]

## 2018-12-17 ENCOUNTER — HOSPITAL ENCOUNTER (OUTPATIENT)
Dept: LAB | Facility: MEDICAL CENTER | Age: 68
End: 2018-12-17
Attending: FAMILY MEDICINE
Payer: MEDICARE

## 2018-12-17 ENCOUNTER — HOSPITAL ENCOUNTER (OUTPATIENT)
Dept: LAB | Facility: MEDICAL CENTER | Age: 68
End: 2018-12-17
Payer: MEDICARE

## 2018-12-17 DIAGNOSIS — R73.03 PREDIABETES: ICD-10-CM

## 2018-12-17 DIAGNOSIS — Z01.818 PREOPERATIVE CLEARANCE: ICD-10-CM

## 2018-12-17 DIAGNOSIS — M17.0 PRIMARY OSTEOARTHRITIS OF BOTH KNEES: ICD-10-CM

## 2018-12-17 DIAGNOSIS — E66.9 OBESITY (BMI 30-39.9): ICD-10-CM

## 2018-12-17 LAB
ANION GAP SERPL CALC-SCNC: 5 MMOL/L (ref 0–11.9)
APPEARANCE UR: CLEAR
BASOPHILS # BLD AUTO: 1.3 % (ref 0–1.8)
BASOPHILS # BLD: 0.06 K/UL (ref 0–0.12)
BILIRUB UR QL STRIP.AUTO: NEGATIVE
BUN SERPL-MCNC: 21 MG/DL (ref 8–22)
CALCIUM SERPL-MCNC: 9.7 MG/DL (ref 8.5–10.5)
CHLORIDE SERPL-SCNC: 107 MMOL/L (ref 96–112)
CO2 SERPL-SCNC: 28 MMOL/L (ref 20–33)
COLOR UR: YELLOW
CREAT SERPL-MCNC: 0.88 MG/DL (ref 0.5–1.4)
EOSINOPHIL # BLD AUTO: 0.28 K/UL (ref 0–0.51)
EOSINOPHIL NFR BLD: 6.3 % (ref 0–6.9)
ERYTHROCYTE [DISTWIDTH] IN BLOOD BY AUTOMATED COUNT: 45.8 FL (ref 35.9–50)
GLUCOSE SERPL-MCNC: 89 MG/DL (ref 65–99)
GLUCOSE UR STRIP.AUTO-MCNC: NEGATIVE MG/DL
HCT VFR BLD AUTO: 48.9 % (ref 37–47)
HGB BLD-MCNC: 15.6 G/DL (ref 12–16)
IMM GRANULOCYTES # BLD AUTO: 0.01 K/UL (ref 0–0.11)
IMM GRANULOCYTES NFR BLD AUTO: 0.2 % (ref 0–0.9)
KETONES UR STRIP.AUTO-MCNC: NEGATIVE MG/DL
LEUKOCYTE ESTERASE UR QL STRIP.AUTO: NEGATIVE
LYMPHOCYTES # BLD AUTO: 1.39 K/UL (ref 1–4.8)
LYMPHOCYTES NFR BLD: 31.2 % (ref 22–41)
MCH RBC QN AUTO: 30.8 PG (ref 27–33)
MCHC RBC AUTO-ENTMCNC: 31.9 G/DL (ref 33.6–35)
MCV RBC AUTO: 96.6 FL (ref 81.4–97.8)
MICRO URNS: NORMAL
MONOCYTES # BLD AUTO: 0.42 K/UL (ref 0–0.85)
MONOCYTES NFR BLD AUTO: 9.4 % (ref 0–13.4)
NEUTROPHILS # BLD AUTO: 2.29 K/UL (ref 2–7.15)
NEUTROPHILS NFR BLD: 51.6 % (ref 44–72)
NITRITE UR QL STRIP.AUTO: NEGATIVE
NRBC # BLD AUTO: 0 K/UL
NRBC BLD-RTO: 0 /100 WBC
PH UR STRIP.AUTO: 6.5 [PH]
PLATELET # BLD AUTO: 244 K/UL (ref 164–446)
PMV BLD AUTO: 10.6 FL (ref 9–12.9)
POTASSIUM SERPL-SCNC: 4.3 MMOL/L (ref 3.6–5.5)
PROT UR QL STRIP: NEGATIVE MG/DL
RBC # BLD AUTO: 5.06 M/UL (ref 4.2–5.4)
RBC UR QL AUTO: NEGATIVE
SODIUM SERPL-SCNC: 140 MMOL/L (ref 135–145)
SP GR UR STRIP.AUTO: 1.02
UROBILINOGEN UR STRIP.AUTO-MCNC: 0.2 MG/DL
WBC # BLD AUTO: 4.5 K/UL (ref 4.8–10.8)

## 2018-12-17 PROCEDURE — 36415 COLL VENOUS BLD VENIPUNCTURE: CPT

## 2018-12-17 PROCEDURE — 80048 BASIC METABOLIC PNL TOTAL CA: CPT

## 2018-12-17 PROCEDURE — 83036 HEMOGLOBIN GLYCOSYLATED A1C: CPT | Mod: GA

## 2018-12-17 PROCEDURE — 81003 URINALYSIS AUTO W/O SCOPE: CPT

## 2018-12-17 PROCEDURE — 85025 COMPLETE CBC W/AUTO DIFF WBC: CPT

## 2018-12-18 LAB
EST. AVERAGE GLUCOSE BLD GHB EST-MCNC: 111 MG/DL
HBA1C MFR BLD: 5.5 % (ref 0–5.6)

## 2019-01-30 ENCOUNTER — OFFICE VISIT (OUTPATIENT)
Dept: MEDICAL GROUP | Facility: LAB | Age: 69
End: 2019-01-30
Payer: MEDICARE

## 2019-01-30 VITALS
SYSTOLIC BLOOD PRESSURE: 130 MMHG | RESPIRATION RATE: 16 BRPM | HEIGHT: 65 IN | HEART RATE: 118 BPM | WEIGHT: 208 LBS | BODY MASS INDEX: 34.66 KG/M2 | DIASTOLIC BLOOD PRESSURE: 82 MMHG | OXYGEN SATURATION: 95 % | TEMPERATURE: 98.3 F

## 2019-01-30 DIAGNOSIS — I10 ESSENTIAL HYPERTENSION: ICD-10-CM

## 2019-01-30 DIAGNOSIS — E66.9 OBESITY (BMI 30-39.9): ICD-10-CM

## 2019-01-30 DIAGNOSIS — Z48.02 VISIT FOR SUTURE REMOVAL: ICD-10-CM

## 2019-01-30 DIAGNOSIS — Z96.651 STATUS POST RIGHT KNEE REPLACEMENT: ICD-10-CM

## 2019-01-30 PROCEDURE — 99214 OFFICE O/P EST MOD 30 MIN: CPT | Mod: 25 | Performed by: FAMILY MEDICINE

## 2019-01-30 ASSESSMENT — PATIENT HEALTH QUESTIONNAIRE - PHQ9: CLINICAL INTERPRETATION OF PHQ2 SCORE: 0

## 2019-01-31 NOTE — PROGRESS NOTES
Subjective:   Jasiel Garrett is a 68 y.o. female here today for knee replacement f/u    1. Status post right knee replacement  New to me.  Patient had a knee replacement 17 days ago.  She is doing well.  She is in physical therapy twice a week.  This is going well for her.  She is not having significant swelling.  She does still have some pain but this is well controlled on her NSAIDs.  She is still on baby aspirin for 21 days after surgery.  She needs sutures removed today.    2. Essential hypertension  This is chronic. Stable. Monitoring BP at home. her home BP readings are on average 120/80. Currently taking losartan 25 mg daily as directed. Also taking baby aspirin. Denies lightheadedness, vision changes, headache, palpitations, chest pain, or leg swelling.    3. Obesity (BMI 30-39.9)  Chronic.  Patient is excited to start exercising once her knee heals.  Her weight is currently stable.        Current medicines (including changes today)  Current Outpatient Prescriptions   Medication Sig Dispense Refill   • NON SPECIFIED 4 wheel walker with seat 1 Each 0   • celecoxib (CELEBREX) 200 MG Cap Take 200 mg by mouth 2 times a day.     • Calcium Citrate (CITRACAL PO) Take  by mouth.     • losartan (COZAAR) 25 MG Tab Take 1 Tab by mouth every day. 90 Tab 3   • methimazole (TAPAZOLE) 5 MG Tab Take 0.5 Tabs by mouth every 48 hours. 30 Tab 3   • aspirin (ASA) 81 MG Chew Tab chewable tablet Take 81 mg by mouth every day.     • vitamin D (CHOLECALCIFEROL) 1000 UNIT Tab Take 1,000 Units by mouth every day.     • acetaminophen (TYLENOL) 500 MG Tab Take 500-1,000 mg by mouth every 6 hours as needed.     • non-formulary med Vibrant Detox Tablet - 3 tabs at night     • MAGNESIUM PO Take 250 mg by mouth every day.     • DIGESTIVE ENZYMES PO Take  by mouth.     • ascorbic acid (ASCORBIC ACID) 500 MG Tab Take 500 mg by mouth every day.       No current facility-administered medications for this visit.      She  has a past medical  "history of Hyperlipidemia; Hypertension; and Hyperthyroidism. She also has no past medical history of Encounter for long-term (current) use of other medications.    ROS   No fevers  No bowel changes  Mild LE edema       Objective:     Blood pressure 130/82, pulse (!) 118, temperature 36.8 °C (98.3 °F), temperature source Temporal, resp. rate 16, height 1.651 m (5' 5\"), weight 94.3 kg (208 lb), SpO2 95 %. Body mass index is 34.61 kg/m².   Physical Exam:  Constitutional: Alert, no distress.  Skin: Warm, dry, good turgor, no rashes in visible areas.  Eye: Equal, round and reactive, conjunctiva clear, lids normal.  ENMT: Lips without lesions, good dentition, oropharynx clear.  Neck: Trachea midline, no masses, no thyromegaly. No cervical or supraclavicular lymphadenopathy  Respiratory: Unlabored respiratory effort, lungs clear to auscultation, no wheezes, no ronchi.  Cardiovascular: Normal S1, S2, RRR, no murmur, no edema.  Abdomen: Soft, non-tender, no masses  Psych: Alert and oriented x3, normal affect and mood.  MSK: Right leg examined.  There are 3 sutures in the thigh and 2 sutures below the knee.  There are internal sutures covered by Steri-Strips across the patella.  There is very minimal ecchymosis, no erythema or fluctuance.  Trace lower extremity edema      Assessment and Plan:   The following treatment plan was discussed    1. Status post right knee replacement  Chronic, stable, sutures removed today    2. Essential hypertension  Chronic, currently stable on losartan 25 mg daily    3. Obesity (BMI 30-39.9)  Chronic, weight is stable, we will monitor this every 6 months    4. Visit for suture removal  5 nylon sutures removed without complication.  Skin is well approximated      Followup: Return in about 9 months (around 10/30/2019) for Medicare Annual.       This note was created using voice recognition software. I have made every reasonable attempt to correct errors, however, I do anticipate some grammatical " errors.

## 2019-02-14 ENCOUNTER — HOSPITAL ENCOUNTER (OUTPATIENT)
Dept: LAB | Facility: MEDICAL CENTER | Age: 69
End: 2019-02-14
Attending: INTERNAL MEDICINE
Payer: MEDICARE

## 2019-02-14 DIAGNOSIS — E05.90 HYPERTHYROIDISM: ICD-10-CM

## 2019-02-14 LAB
T3FREE SERPL-MCNC: 4.25 PG/ML (ref 2.4–4.2)
T4 FREE SERPL-MCNC: 1.22 NG/DL (ref 0.53–1.43)
TSH SERPL DL<=0.005 MIU/L-ACNC: 0.11 UIU/ML (ref 0.38–5.33)

## 2019-02-14 PROCEDURE — 36415 COLL VENOUS BLD VENIPUNCTURE: CPT

## 2019-02-14 PROCEDURE — 84443 ASSAY THYROID STIM HORMONE: CPT

## 2019-02-14 PROCEDURE — 84481 FREE ASSAY (FT-3): CPT

## 2019-02-14 PROCEDURE — 84439 ASSAY OF FREE THYROXINE: CPT

## 2019-02-19 ENCOUNTER — OFFICE VISIT (OUTPATIENT)
Dept: ENDOCRINOLOGY | Facility: MEDICAL CENTER | Age: 69
End: 2019-02-19
Payer: MEDICARE

## 2019-02-19 VITALS
BODY MASS INDEX: 34.82 KG/M2 | WEIGHT: 209 LBS | DIASTOLIC BLOOD PRESSURE: 90 MMHG | HEIGHT: 65 IN | SYSTOLIC BLOOD PRESSURE: 130 MMHG | HEART RATE: 94 BPM | OXYGEN SATURATION: 95 %

## 2019-02-19 DIAGNOSIS — E05.00 GRAVES DISEASE: ICD-10-CM

## 2019-02-19 DIAGNOSIS — E05.90 HYPERTHYROIDISM: ICD-10-CM

## 2019-02-19 PROCEDURE — 99213 OFFICE O/P EST LOW 20 MIN: CPT | Performed by: INTERNAL MEDICINE

## 2019-02-19 NOTE — PROGRESS NOTES
Chief Complaint   Patient presents with   • Thyrotoxicosis     Graves' disease        HPI:        1. Thyrotoxicosis, Grave’s disease.    The patient went through her knee replacement just fine without incident.  She had taken her methimazole 2.5 mg every day up to the time of her anesthesia.  She is now back to 2.5 mg about twice a week.  Her current TSH is suppressed at 0.1 but her free thyroxin is mid range at 1.2 and her free T3 is upper normal at 4.2.  She is very comfortable on this dose.  When I increase the dose of methimazole she struggles with fatigue and difficulty with weight control.  I am hopeful that this is going into remission before too long.  It goes back to 2017 and her gland as far as I can tell is small.  It is not a large gland so I am hopeful for remission.  At her next blood draw, I will do thyrotropin receptor antibodies which should give us some indication.     She does not have any Grave’s ophthalmopathy.  She definitely does not want a thyroidectomy or I-131 therapy.  So this is a compromise.  At home, she monitors her blood pressure which she tells me is normal and her pulse usually is running in the 70s.  We will continue to monitor monthly and hopefully she will be in remission before too long and off the methimazole.      She understands the excessive thyroid hormone may accelerate blood loss. Her bone density done August 2018 showed a T-score of -1.3 in the lumbar spine and -1.3 at the left hip.  Nothing serious but we will want to watch this over time and check it again probably next fall.  In the meantime, monitor thyroid levels monthly.     ROS:  Recovering from knee replacement very well.  Getting into regular exercise.      Allergies: No Known Allergies    Current medicines including changes today:  Current Outpatient Prescriptions   Medication Sig Dispense Refill   • celecoxib (CELEBREX) 200 MG Cap Take 200 mg by mouth 2 times a day.     • Calcium Citrate (CITRACAL PO) Take  by  "mouth.     • losartan (COZAAR) 25 MG Tab Take 1 Tab by mouth every day. 90 Tab 3   • methimazole (TAPAZOLE) 5 MG Tab Take 0.5 Tabs by mouth every 48 hours. 30 Tab 3   • ascorbic acid (ASCORBIC ACID) 500 MG Tab Take 500 mg by mouth every day.     • vitamin D (CHOLECALCIFEROL) 1000 UNIT Tab Take 1,000 Units by mouth every day.     • NON SPECIFIED 4 wheel walker with seat 1 Each 0   • non-formulary med Vibrant Detox Tablet - 3 tabs at night     • MAGNESIUM PO Take 250 mg by mouth every day.     • DIGESTIVE ENZYMES PO Take  by mouth.     • aspirin (ASA) 81 MG Chew Tab chewable tablet Take 81 mg by mouth every day.     • acetaminophen (TYLENOL) 500 MG Tab Take 500-1,000 mg by mouth every 6 hours as needed.       No current facility-administered medications for this visit.         Past Medical History:   Diagnosis Date   • Hyperlipidemia    • Hypertension    • Hyperthyroidism     Graves' disease / thyrotropin receptor antibody = 6.2       PHYSICAL EXAM:    /90 (BP Location: Right arm, Patient Position: Sitting)   Pulse 94   Ht 1.651 m (5' 5\")   Wt 94.8 kg (209 lb)   SpO2 95%   BMI 34.78 kg/m² Heart rate during my examination    Gen.   appears healthy, does not appear to be overtly thyrotoxic    Skin   appropriate for sex and age    HEENT no eye signs of Graves' disease    Neck   thyroid gland is difficult to palpate.  It is possibly substernal    Heart  regular    Extremities  no edema    Neuro  gait and station normal, no tremor    Psych  appropriate, calm, pleasant    ASSESSMENT AND RECOMMENDATIONS    1. Hyperthyroidism              Clinically euthyroid taking methimazole 2.5 mg twice weekly              Continue to monitor blood levels monthly    2. Graves disease             Check thyrotropin receptor antibodies to see if she may be close to remission      DISPOSITION: Return in 3 months      Denny Ireland M.D.    Copies to: Pia Dominguez M.D. [phone]  "

## 2019-03-12 DIAGNOSIS — E05.90 HYPERTHYROIDISM: ICD-10-CM

## 2019-03-12 NOTE — TELEPHONE ENCOUNTER
Was the patient seen in the last year in this department? Yes  LOV 1/30/19  Does patient have an active prescription for medications requested? No     Received Request Via: Pharmacy

## 2019-03-14 RX ORDER — METHIMAZOLE 5 MG/1
TABLET ORAL
Qty: 270 TAB | Refills: 3 | OUTPATIENT
Start: 2019-03-14

## 2019-03-14 NOTE — TELEPHONE ENCOUNTER
Managed by Dr. Ireland. On 2.5mg twice a week per his note. Last refill from him 1/16 with 3 refills.  Pia Dominguez M.D.

## 2019-03-15 ENCOUNTER — HOSPITAL ENCOUNTER (OUTPATIENT)
Dept: LAB | Facility: MEDICAL CENTER | Age: 69
End: 2019-03-15
Attending: INTERNAL MEDICINE
Payer: MEDICARE

## 2019-03-15 DIAGNOSIS — E05.90 HYPERTHYROIDISM: ICD-10-CM

## 2019-03-15 DIAGNOSIS — E05.00 GRAVES DISEASE: ICD-10-CM

## 2019-03-15 PROCEDURE — 84481 FREE ASSAY (FT-3): CPT

## 2019-03-15 PROCEDURE — 84443 ASSAY THYROID STIM HORMONE: CPT

## 2019-03-15 PROCEDURE — 84439 ASSAY OF FREE THYROXINE: CPT

## 2019-03-15 PROCEDURE — 83520 IMMUNOASSAY QUANT NOS NONAB: CPT

## 2019-03-15 PROCEDURE — 36415 COLL VENOUS BLD VENIPUNCTURE: CPT

## 2019-03-16 DIAGNOSIS — E05.90 HYPERTHYROIDISM: ICD-10-CM

## 2019-03-16 LAB
T3FREE SERPL-MCNC: 3.68 PG/ML (ref 2.4–4.2)
T4 FREE SERPL-MCNC: 1.28 NG/DL (ref 0.53–1.43)
TSH SERPL DL<=0.005 MIU/L-ACNC: 0.05 UIU/ML (ref 0.38–5.33)

## 2019-03-18 LAB — TSH RECEP AB SER-ACNC: 3.79 IU/L

## 2019-03-18 RX ORDER — METHIMAZOLE 5 MG/1
TABLET ORAL
Qty: 90 TAB | Refills: 2 | OUTPATIENT
Start: 2019-03-18

## 2019-03-20 DIAGNOSIS — E05.90 HYPERTHYROIDISM: ICD-10-CM

## 2019-03-20 RX ORDER — METHIMAZOLE 5 MG/1
2.5 TABLET ORAL
Qty: 30 TAB | Refills: 3 | Status: SHIPPED | OUTPATIENT
Start: 2019-03-20 | End: 2020-06-29 | Stop reason: SDUPTHER

## 2019-03-20 NOTE — TELEPHONE ENCOUNTER
1. Caller Name: Denny                                         Call Back Number: [phone] (home)       Patient approves a detailed voicemail message: yes    Med refill     Was the patient seen in the last year in this department? Yes    Does patient have an active prescription for medications requested? No     Received Request Via: Patient     methimazole (TAPAZOLE) 5 MG Tab     Sig - Route: Take 0.5 Tabs by mouth 3 days a week

## 2019-04-11 ENCOUNTER — HOSPITAL ENCOUNTER (OUTPATIENT)
Dept: LAB | Facility: MEDICAL CENTER | Age: 69
End: 2019-04-11
Attending: INTERNAL MEDICINE
Payer: MEDICARE

## 2019-04-11 PROCEDURE — 36415 COLL VENOUS BLD VENIPUNCTURE: CPT

## 2019-04-11 PROCEDURE — 83520 IMMUNOASSAY QUANT NOS NONAB: CPT

## 2019-04-11 PROCEDURE — 84439 ASSAY OF FREE THYROXINE: CPT

## 2019-04-11 PROCEDURE — 84481 FREE ASSAY (FT-3): CPT

## 2019-04-11 PROCEDURE — 84443 ASSAY THYROID STIM HORMONE: CPT

## 2019-04-12 LAB
T3FREE SERPL-MCNC: 3.89 PG/ML (ref 2.4–4.2)
T4 FREE SERPL-MCNC: 1 NG/DL (ref 0.53–1.43)
TSH SERPL DL<=0.005 MIU/L-ACNC: 0.26 UIU/ML (ref 0.38–5.33)

## 2019-04-14 LAB — TSH RECEP AB SER-ACNC: 3.88 IU/L

## 2019-07-12 ENCOUNTER — HOSPITAL ENCOUNTER (OUTPATIENT)
Dept: LAB | Facility: MEDICAL CENTER | Age: 69
End: 2019-07-12
Attending: INTERNAL MEDICINE
Payer: MEDICARE

## 2019-07-12 LAB
T3FREE SERPL-MCNC: 3.95 PG/ML (ref 2.4–4.2)
T4 FREE SERPL-MCNC: 0.93 NG/DL (ref 0.53–1.43)
TSH SERPL DL<=0.005 MIU/L-ACNC: 0.58 UIU/ML (ref 0.38–5.33)

## 2019-07-12 PROCEDURE — 83520 IMMUNOASSAY QUANT NOS NONAB: CPT

## 2019-07-12 PROCEDURE — 84481 FREE ASSAY (FT-3): CPT

## 2019-07-12 PROCEDURE — 84439 ASSAY OF FREE THYROXINE: CPT

## 2019-07-12 PROCEDURE — 84443 ASSAY THYROID STIM HORMONE: CPT

## 2019-07-12 PROCEDURE — 36415 COLL VENOUS BLD VENIPUNCTURE: CPT

## 2019-07-15 LAB — TSH RECEP AB SER-ACNC: 3.14 IU/L

## 2019-07-16 ENCOUNTER — OFFICE VISIT (OUTPATIENT)
Dept: ENDOCRINOLOGY | Facility: MEDICAL CENTER | Age: 69
End: 2019-07-16
Payer: MEDICARE

## 2019-07-16 VITALS
WEIGHT: 214.4 LBS | HEIGHT: 65 IN | HEART RATE: 102 BPM | DIASTOLIC BLOOD PRESSURE: 90 MMHG | BODY MASS INDEX: 35.72 KG/M2 | SYSTOLIC BLOOD PRESSURE: 126 MMHG | OXYGEN SATURATION: 93 %

## 2019-07-16 DIAGNOSIS — E05.90 HYPERTHYROIDISM: ICD-10-CM

## 2019-07-16 DIAGNOSIS — E05.00 GRAVES DISEASE: ICD-10-CM

## 2019-07-16 PROCEDURE — 99213 OFFICE O/P EST LOW 20 MIN: CPT | Performed by: INTERNAL MEDICINE

## 2019-07-16 NOTE — PROGRESS NOTES
"Chief Complaint   Patient presents with   • Thyrotoxicosis     Graves' disease        HPI:    See assessment and recommendations below    ROS:  Recovering from the knee replacement surgery.  Had a very nice trip to [place] and enjoyed it      Allergies: No Known Allergies    Current medicines including changes today:  Current Outpatient Prescriptions   Medication Sig Dispense Refill   • methimazole (TAPAZOLE) 5 MG Tab Take 0.5 Tabs by mouth every 48 hours. 30 Tab 3   • Calcium Citrate (CITRACAL PO) Take 1,200 Units by mouth.     • losartan (COZAAR) 25 MG Tab Take 1 Tab by mouth every day. (Patient taking differently: Take 50 mg by mouth every day.) 90 Tab 3   • ascorbic acid (ASCORBIC ACID) 500 MG Tab Take 500 mg by mouth every day.     • acetaminophen (TYLENOL) 500 MG Tab Take 500-1,000 mg by mouth every 6 hours as needed.     • NON SPECIFIED 4 wheel walker with seat 1 Each 0   • celecoxib (CELEBREX) 200 MG Cap Take 200 mg by mouth 2 times a day.     • non-formulary med Vibrant Detox Tablet - 3 tabs at night     • MAGNESIUM PO Take 250 mg by mouth every day.     • DIGESTIVE ENZYMES PO Take  by mouth.     • aspirin (ASA) 81 MG Chew Tab chewable tablet Take 81 mg by mouth every day.     • vitamin D (CHOLECALCIFEROL) 1000 UNIT Tab Take 1,000 Units by mouth every day.       No current facility-administered medications for this visit.         Past Medical History:   Diagnosis Date   • Hyperlipidemia    • Hypertension    • Hyperthyroidism     Graves' disease / thyrotropin receptor antibody = 6.2       PHYSICAL EXAM:    /90 (BP Location: Left arm, Patient Position: Sitting, BP Cuff Size: Adult)   Pulse (!) 102   Ht 1.651 m (5' 5\")   Wt 97.3 kg (214 lb 6.4 oz)   SpO2 93%   BMI 35.68 kg/m²   Heart rate during my examination    Gen.   appears healthy     Skin   appropriate for sex and age    HEENT  unremarkable    Neck   thyroid gland is relatively small and difficult to palpate.  It is partly substernal.  It is " not a large gland    Heart  regular    Extremities  no edema    Neuro  gait and station normal, no tremor    Psych  appropriate    ASSESSMENT AND RECOMMENDATIONS    1. Hyperthyroidism             Patient has responded very well to methimazole and is clinically euthyroid taking 2.5 mg 2 days a week.              When we went off the methimazole the TSH went down and she still has thyrotropin receptor antibody present.  Not likely in remission even though it takes only a small dose of methimazole to control her output.              We will continue as is and monitor every 2 months.              I will see her again in 6 months  - FREE THYROXINE; Standing  - T3 FREE; Standing  - TSH; Standing    2. Graves disease             No significant ophthalmolpathy             I am watching the thyrotropin receptor antibody as a predictor of remission  - THYROTROPIN RECEP AB; Standing      DISPOSITION: Monitor blood levels every 2 months and report by my chart                            Return in 6 months      Denny Ireland M.D.    Copies to: Pia Dominguez M.D. [phone]

## 2019-08-08 DIAGNOSIS — I10 ESSENTIAL HYPERTENSION: ICD-10-CM

## 2019-08-08 RX ORDER — LOSARTAN POTASSIUM 25 MG/1
TABLET ORAL
Qty: 90 TAB | Refills: 1 | Status: SHIPPED | OUTPATIENT
Start: 2019-08-08 | End: 2019-10-04 | Stop reason: SDUPTHER

## 2019-08-08 NOTE — TELEPHONE ENCOUNTER
Was the patient seen in the last year in this department? Yes 7-    Does patient have an active prescription for medications requested? No     Received Request Via: Pharmacy

## 2019-09-30 ENCOUNTER — TELEPHONE (OUTPATIENT)
Dept: MEDICAL GROUP | Facility: LAB | Age: 69
End: 2019-09-30

## 2019-10-01 ENCOUNTER — HOSPITAL ENCOUNTER (OUTPATIENT)
Dept: LAB | Facility: MEDICAL CENTER | Age: 69
End: 2019-10-01
Attending: INTERNAL MEDICINE
Payer: MEDICARE

## 2019-10-01 LAB
T3FREE SERPL-MCNC: 3.7 PG/ML (ref 2.4–4.2)
T4 FREE SERPL-MCNC: 1.21 NG/DL (ref 0.53–1.43)
TSH SERPL DL<=0.005 MIU/L-ACNC: 0.2 UIU/ML (ref 0.38–5.33)

## 2019-10-01 PROCEDURE — 84439 ASSAY OF FREE THYROXINE: CPT

## 2019-10-01 PROCEDURE — 84443 ASSAY THYROID STIM HORMONE: CPT

## 2019-10-01 PROCEDURE — 36415 COLL VENOUS BLD VENIPUNCTURE: CPT

## 2019-10-01 PROCEDURE — 84481 FREE ASSAY (FT-3): CPT

## 2019-10-01 NOTE — TELEPHONE ENCOUNTER
----- Message from Jasiel Garrett sent at 9/30/2019  5:11 PM PDT -----  Regarding: Prescription Question  Contact: [phone]  DR. Dominguez,         Would you please send in a Renewal for my Losartin 50 mg prescription to [facility].            Thank you and hope to see you soon!               Jasiel Garrett

## 2019-10-04 DIAGNOSIS — I10 ESSENTIAL HYPERTENSION: ICD-10-CM

## 2019-10-04 RX ORDER — LOSARTAN POTASSIUM 25 MG/1
25 TABLET ORAL
Qty: 30 TAB | Refills: 2 | Status: SHIPPED | OUTPATIENT
Start: 2019-10-04 | End: 2019-10-22

## 2019-10-04 NOTE — TELEPHONE ENCOUNTER
----- Message from Jasiel Garrett sent at 10/4/2019  8:12 AM PDT -----  Regarding: RE: Prescription Question  Contact: [phone]  Yes, you are correct as the Prescription was for 25 mg Losartan. I Presently only have 10 days remaining on this script.   I was scheduled for my Annual visit with Dr. Dominguez on October 8th but she had to cancel.  Given the option to be placed on her waitlist, I elected to wait. We have been with Dr. Dominguez for 3 years now and are very happy with our relationship and close association with her. Again, as such we have elected to wait to see Dr. Dominguez however, I only have 10 days remaining of Losartan on my last prescription.  Thank You, Jasiel GARRETT  ----- Message -----  From: Dora Richter, Med Ass't  Sent: 9/30/2019  5:13 PM PDT  To: Jasiel Garrett  Subject: RE: Prescription Question  The last note states -2. Essential hypertension  Chronic, currently stable on losartan 25 mg daily       ----- Message -----     From: Jasiel Garrett     Sent: 9/30/2019  5:11 PM PDT       To: Pia Dominguez M.D.  Subject: Prescription Question    DR. Dominguez,         Would you please send in a Renewal for my Losartin 50 mg prescription to [facility].            Thank you and hope to see you soon!               Jasiel Garrett

## 2019-10-04 NOTE — TELEPHONE ENCOUNTER
Was the patient seen in the last year in this department? Yes 1/30/19    Does patient have an active prescription for medications requested? No     Received Request Via: Patient

## 2019-10-12 DIAGNOSIS — Z78.0 POST-MENOPAUSAL: ICD-10-CM

## 2019-10-12 DIAGNOSIS — M85.89 OSTEOPENIA OF MULTIPLE SITES: ICD-10-CM

## 2019-10-16 ENCOUNTER — TELEPHONE (OUTPATIENT)
Dept: MEDICAL GROUP | Facility: MEDICAL CENTER | Age: 69
End: 2019-10-16

## 2019-10-16 NOTE — TELEPHONE ENCOUNTER
Left message for patient to call back regarding pre-visit planning. Please transfer call to [phone].

## 2019-10-16 NOTE — TELEPHONE ENCOUNTER
Future Appointments       Provider Department Center    10/22/2019 4:00 PM Maryse James M.D.; [facility]  [facility] [place]     1/14/2020 10:20 AM Denny Ireland M.D. [facility]          ANNUAL WELLNESS VISIT PRE-VISIT PLANNING WITHOUT OUTREACH    1.  Reviewed note from last office visit with PCP: YES    2.  If any orders were placed at last visit, do we have Results/Consult Notes?        •  Labs - Labs were not ordered at last office visit.  Note: If patient appointment is for lab review and patient did not complete labs, check with provider if OK to reschedule patient until labs completed.       •  Imaging - Imaging was not ordered at last office visit.       •  Referrals - No referrals were ordered at last office visit.    3.  Immunizations were updated in Epic using WebIZ?: Epic matches WebIZ       •  WebIZ Recommendations: FLU, PNEUMOVAX (PPSV23), TDAP, SHINGRIX (Shingles) and CPOX        •  Is patient due for Tdap? YES. Patient was not notified of copay/out of pocket cost.       •  Is patient due for Shingrix? YES. Patient was not notified of copay/out of pocket cost.     4.  Patient is due for the following Health Maintenance Topics:   Health Maintenance Due   Topic Date Due   • HEPATITIS C SCREENING  1950   • IMM DTaP/Tdap/Td Vaccine (1 - Tdap) 04/10/1970   • IMM ZOSTER VACCINES (1 of 2) 04/10/2001   • IMM PNEUMOCOCCAL VACCINE: 65+ Years (2 of 2 - PPSV23) 07/26/2019   • Annual Wellness Visit  07/27/2019   • IMM INFLUENZA (1) 09/01/2019     5.  Reviewed/Updated the following with patient:       •   Preferred Pharmacy? NO       •   Preferred Lab? NO       •   Preferred Communication? NO       •   Allergies? NO       •   Medications? NO       •   Social History? NO       •   Family History (document living status of immediate family members and if + hx of  cancer, diabetes, hypertension, hyperlipidemia, heart attack, stroke)  NO    6.  Care Team Updated:       •   DME Company (gait device, O2, CPAP, etc.): NO       •   Other Specialists (eye doctor, derm, GYN, cardiology, endo, etc): NO    7. Orders for overdue Health Maintenance topics pended in Pre-Charting? N\A    8.  Patient has the following Care Path diagnoses on Problem List:  NONE    9.  Patient was not advised: “This is a free wellness visit. The provider will screen for medical conditions to help you stay healthy. If you have other concerns to address you may be asked to discuss these at a separate visit or there may be an additional fee.”     10.  Patient was NOT informed to arrive 15 min prior to their scheduled appointment and bring in their medication bottles.

## 2019-10-22 ENCOUNTER — OFFICE VISIT (OUTPATIENT)
Dept: MEDICAL GROUP | Facility: LAB | Age: 69
End: 2019-10-22
Payer: MEDICARE

## 2019-10-22 VITALS
WEIGHT: 217.2 LBS | RESPIRATION RATE: 16 BRPM | BODY MASS INDEX: 36.19 KG/M2 | TEMPERATURE: 98 F | HEART RATE: 86 BPM | OXYGEN SATURATION: 96 % | SYSTOLIC BLOOD PRESSURE: 128 MMHG | DIASTOLIC BLOOD PRESSURE: 84 MMHG | HEIGHT: 65 IN

## 2019-10-22 DIAGNOSIS — Z12.31 ENCOUNTER FOR SCREENING MAMMOGRAM FOR BREAST CANCER: ICD-10-CM

## 2019-10-22 DIAGNOSIS — Z13.0 SCREENING FOR DEFICIENCY ANEMIA: ICD-10-CM

## 2019-10-22 DIAGNOSIS — E78.2 MIXED HYPERLIPIDEMIA: ICD-10-CM

## 2019-10-22 DIAGNOSIS — I10 ESSENTIAL HYPERTENSION: ICD-10-CM

## 2019-10-22 DIAGNOSIS — Z86.010 HISTORY OF COLONIC POLYPS: ICD-10-CM

## 2019-10-22 DIAGNOSIS — R73.03 PREDIABETES: ICD-10-CM

## 2019-10-22 DIAGNOSIS — E05.90 HYPERTHYROIDISM: ICD-10-CM

## 2019-10-22 DIAGNOSIS — E05.00 GRAVES DISEASE: ICD-10-CM

## 2019-10-22 DIAGNOSIS — Z12.11 SCREENING FOR COLON CANCER: ICD-10-CM

## 2019-10-22 DIAGNOSIS — Z23 NEED FOR VACCINATION: ICD-10-CM

## 2019-10-22 DIAGNOSIS — Z00.00 MEDICARE ANNUAL WELLNESS VISIT, SUBSEQUENT: ICD-10-CM

## 2019-10-22 DIAGNOSIS — Z11.59 NEED FOR HEPATITIS C SCREENING TEST: ICD-10-CM

## 2019-10-22 DIAGNOSIS — M85.89 OSTEOPENIA OF MULTIPLE SITES: ICD-10-CM

## 2019-10-22 PROBLEM — Z86.0100 HISTORY OF COLONIC POLYPS: Status: ACTIVE | Noted: 2019-10-22

## 2019-10-22 PROCEDURE — G0009 ADMIN PNEUMOCOCCAL VACCINE: HCPCS | Performed by: FAMILY MEDICINE

## 2019-10-22 PROCEDURE — 90732 PPSV23 VACC 2 YRS+ SUBQ/IM: CPT | Performed by: FAMILY MEDICINE

## 2019-10-22 PROCEDURE — G0439 PPPS, SUBSEQ VISIT: HCPCS | Performed by: FAMILY MEDICINE

## 2019-10-22 RX ORDER — LOSARTAN POTASSIUM 50 MG/1
50 TABLET ORAL DAILY
Qty: 90 TAB | Refills: 3 | Status: SHIPPED | OUTPATIENT
Start: 2019-10-22 | End: 2020-09-23

## 2019-10-22 RX ORDER — INFLUENZA A VIRUS A/BRISBANE/02/2018 IVR-190 (H1N1) ANTIGEN (FORMALDEHYDE INACTIVATED), INFLUENZA A VIRUS A/KANSAS/14/2017 X-327 (H3N2) ANTIGEN (FORMALDEHYDE INACTIVATED), INFLUENZA B VIRUS B/PHUKET/3073/2013 ANTIGEN (FORMALDEHYDE INACTIVATED), AND INFLUENZA B VIRUS B/MARYLAND/15/2016 BX-69A ANTIGEN (FORMALDEHYDE INACTIVATED) 15; 15; 15; 15 UG/.5ML; UG/.5ML; UG/.5ML; UG/.5ML
0.5 INJECTION, SUSPENSION INTRAMUSCULAR
COMMUNITY
Start: 2019-10-10 | End: 2020-10-27

## 2019-10-22 ASSESSMENT — PATIENT HEALTH QUESTIONNAIRE - PHQ9: CLINICAL INTERPRETATION OF PHQ2 SCORE: 0

## 2019-10-22 ASSESSMENT — ACTIVITIES OF DAILY LIVING (ADL): BATHING_REQUIRES_ASSISTANCE: 0

## 2019-10-22 ASSESSMENT — ENCOUNTER SYMPTOMS: GENERAL WELL-BEING: EXCELLENT

## 2019-10-22 NOTE — PROGRESS NOTES
Chief Complaint   Patient presents with   • Health Risk Assessments For Seniors       Jasiel is a regular patient of Dr. Dominguez's  HPI:  Jasiel is a 69 y.o. here for Medicare Annual Wellness Visit        Patient Active Problem List    Diagnosis Date Noted   • Post-menopause 10/24/2019   • History of colonic polyps 10/22/2019   • Osteopenia of multiple sites 10/12/2019   • Prediabetes 07/31/2018   • Graves disease 04/05/2018   • Hyperthyroidism 07/14/2017   • Mixed hyperlipidemia 03/01/2017   • Essential hypertension 03/01/2017   • Obesity (BMI 30-39.9) 03/01/2017   • Primary osteoarthritis of both knees 03/01/2017       Current Outpatient Medications   Medication Sig Dispense Refill   • losartan (COZAAR) 50 MG Tab Take 1 Tab by mouth every day. 90 Tab 3   • methimazole (TAPAZOLE) 5 MG Tab Take 0.5 Tabs by mouth every 48 hours. 30 Tab 3   • Calcium Citrate (CITRACAL PO) Take 1,200 Units by mouth.     • non-formulary med Vibrant Detox Tablet - 3 tabs at night     • DIGESTIVE ENZYMES PO Take  by mouth.     • ascorbic acid (ASCORBIC ACID) 500 MG Tab Take 500 mg by mouth every day.     • vitamin D (CHOLECALCIFEROL) 1000 UNIT Tab Take 1,000 Units by mouth every day.     • acetaminophen (TYLENOL) 500 MG Tab Take 500-1,000 mg by mouth every 6 hours as needed.     • FLUZONE QUADRIVALENT 0.5 ML Suspension injection 0.5 mL by Injection route.     • NON SPECIFIED 4 wheel walker with seat 1 Each 0   • celecoxib (CELEBREX) 200 MG Cap Take 200 mg by mouth 2 times a day.     • MAGNESIUM PO Take 250 mg by mouth every day.     • aspirin (ASA) 81 MG Chew Tab chewable tablet Take 81 mg by mouth every day.       No current facility-administered medications for this visit.         Patient is taking medications as noted in medication list.  Current supplements as per medication list.     Allergies: Patient has no known allergies.    Current social contact/activities: Lift weights x3 a week, rides a bike for 32 mins x3 a week and  walks x2 a week due to knee replacement  Is patient current with immunizations? No, due for PNEUMOVAX (PPSV23). Patient is interested in receiving PNEUMOVAX (PPSV23) today.    She  reports that she quit smoking about 22 years ago. She has a 10.00 pack-year smoking history. She has never used smokeless tobacco. She reports that she drinks about 1.2 oz of alcohol per week. She reports that she does not use drugs.  Counseling given: Yes        DPA/Advanced directive: Patient has Durable Power of  on file.     ROS:    Gait: Uses no assistive device   Ostomy: No   Other tubes: No   Amputations: No   Chronic oxygen use No   Last eye exam 5-6 years  Wears hearing aids: No   : Reports urinary leakage during the last 6 months that has somewhat interfered with their daily activities or sleep.      Screening:        Depression Screening    Little interest or pleasure in doing things?  0 - not at all  Feeling down, depressed, or hopeless? 0 - not at all  Trouble falling or staying asleep, or sleeping too much?     Feeling tired or having little energy?     Poor appetite or overeating?     Feeling bad about yourself - or that you are a failure or have let yourself or your family down?    Trouble concentrating on things, such as reading the newspaper or watching television?    Moving or speaking so slowly that other people could have noticed.  Or the opposite - being so fidgety or restless that you have been moving around a lot more than usual?     Thoughts that you would be better off dead, or of hurting yourself?     Patient Health Questionnaire Score:        If depressive symptoms identified deferred to follow up visit unless specifically addressed in assessment and plan.    Interpretation of PHQ-9 Total Score   Score Severity   1-4 No Depression   5-9 Mild Depression   10-14 Moderate Depression   15-19 Moderately Severe Depression   20-27 Severe Depression      Screening for Cognitive Impairment    Three Minute  Recall (village, kitchen, baby)  33/3 3/3  Draw clock face with all 12 numbers and set the hands to show 10 past 10.  Yes 5/55/5  If cognitive concerns identified, deferred for follow up unless specifically addressed in assessment and plan.    Fall Risk Assessment    Has the patient had two or more falls in the last year or any fall with injury in the last year?  Yes  If fall risk identified, deferred for follow up unless specifically addressed in assessment and plan.      Safety Assessment    Throw rugs on floor.  NoN  Handrails on all stairs.  Mitchell  Good lighting in all hallways.  Mitchell  Difficulty hearing.  No N  Patient counseled about all safety risks that were identified.    Functional Assessment ADLs    Are there any barriers preventing you from cooking for yourself or meeting nutritional needs?  No.    Are there any barriers preventing you from driving safely or obtaining transportation?  No.    Are there any barriers preventing you from using a telephone or calling for help?  No.    Are there any barriers preventing you from shopping?  No.    Are there any barriers preventing you from taking care of your own finances?  No.    Are there any barriers preventing you from managing your medications?    No.    Are there any barriers preventing you from showering, bathing or dressing yourself?  No.    Are you currently engaging in any exercise or physical activity?  Yes.     What is your perception of your health?  Excellent.    Health Maintenance Summary                HEPATITIS C SCREENING Overdue 1950     IMM DTaP/Tdap/Td Vaccine Overdue 4/10/1970     COLONOSCOPY Overdue 2/20/2019      Done 2/20/2016 REFERRAL TO GI FOR COLONOSCOPY     Patient has more history with this topic...    IMM ZOSTER VACCINES Postponed 3/24/2020 Originally 4/10/2001. System: vaccine not available, other system reasons    MAMMOGRAM Next Due 8/31/2020      Done 8/31/2018 MA-DIAGNOSTIC MAMMO-UNILAT     Patient has more history with  "this topic...    Annual Wellness Visit Next Due 10/22/2020      Done 10/22/2019 SUBSEQUENT ANNUAL WELLNESS VISIT-INCLUDES PPPS ()     Patient has more history with this topic...    BONE DENSITY Next Due 2023      Done 2018 DS-BONE DENSITY STUDY (DEXA)     Patient has more history with this topic...          Patient Care Team:  Pia Dominguez M.D. as PCP - General (Family Medicine)  Denny Ireland M.D. (Endocrinology)      Social History     Tobacco Use   • Smoking status: Former Smoker     Packs/day: 0.50     Years: 20.00     Pack years: 10.00     Last attempt to quit: 3/1/1997     Years since quittin.6   • Smokeless tobacco: Never Used   Substance Use Topics   • Alcohol use: Yes     Alcohol/week: 1.2 oz     Types: 2 Glasses of wine per week   • Drug use: No     Family History   Problem Relation Age of Onset   • Arthritis Mother    • Heart Disease Father         arteriosclerosis   • Alcohol/Drug Sister    • Alcohol/Drug Brother      She  has a past medical history of Hyperlipidemia, Hypertension, and Hyperthyroidism. She also has no past medical history of Encounter for long-term (current) use of other medications.   Past Surgical History:   Procedure Laterality Date   • HYSTEROSCOPY WITH VIDEO DIAGNOSTIC           Exam:     /84 (BP Location: Right arm, Patient Position: Sitting, BP Cuff Size: Adult)   Pulse 86   Temp 36.7 °C (98 °F) (Temporal)   Resp 16   Ht 1.64 m (5' 4.57\")   Wt 98.5 kg (217 lb 3.2 oz)   SpO2 96%  Body mass index is 36.63 kg/m².    Hearing good.    Dentition good  Alert, oriented in no acute distress.  Eye contact is good, speech goal directed, affect calm  Constitutional: Alert, no distress.  Skin: Warm, dry, good turgor, no rashes in visible areas.  Eye: Equal, round and reactive, conjunctiva clear, lids normal.  ENMT: Lips without lesions, good dentition, oropharynx clear.  Neck: Trachea midline, no masses, no thyromegaly. No cervical or " supraclavicular lymphadenopathy  Respiratory: Unlabored respiratory effort, lungs clear to auscultation, no wheezes, no ronchi.  Cardiovascular: Normal S1, S2, RRR, no murmur, no edema.  Abdomen: Soft, non-tender, no masses, no hepatosplenomegaly.  Psych: Alert and oriented x3, normal affect and mood.        Assessment and Plan. The following treatment and monitoring plan is recommended:   1. Medicare annual wellness visit, subsequent  Routine anticipatory guidance with no special services needed at this time  - Subsequent Annual Wellness Visit - Includes PPPS ()    2. Need for vaccination    - Pneumoccocal Polysaccharide Vaccine 23-Valent =>3yo SQ/IM  - Subsequent Annual Wellness Visit - Includes PPPS ()    3. Essential hypertension  This is a chronic medical condition that is currently stable  This is new problem to me.  Losartan 50 mg daily  - Comp Metabolic Panel; Future  - losartan (COZAAR) 50 MG Tab; Take 1 Tab by mouth every day.  Dispense: 90 Tab; Refill: 3  - Subsequent Annual Wellness Visit - Includes PPPS ()    4. Graves disease  Follow-up with endocrinology as scheduled  - Subsequent Annual Wellness Visit - Includes PPPS ()    5. Hyperthyroidism  Follow-up with endocrinology as scheduled  - Subsequent Annual Wellness Visit - Includes PPPS ()    6. Mixed hyperlipidemia  Discussed statin use for cardiovascular protection.  Check labs.  Dietary counseling done  - Lipid Profile; Future  - Subsequent Annual Wellness Visit - Includes PPPS ()    7. Osteopenia of multiple sites  Check vitamin D level.  Continue weightbearing exercise, calcium and vitamin D supplementation  - VITAMIN D,25 HYDROXY; Future  - Subsequent Annual Wellness Visit - Includes PPPS ()    8. Prediabetes  Dietary counseling done.  Recheck labs  - Comp Metabolic Panel; Future  - HEMOGLOBIN A1C; Future  - Subsequent Annual Wellness Visit - Includes PPPS ()    9. History of colonic polyps  We will get a  copy of the pathology from her previous gastroenterologist.  Refer to Gastroenterology for follow-up colonoscopy  - REFERRAL TO GASTROENTEROLOGY  - Subsequent Annual Wellness Visit - Includes PPPS ()    10. Screening for colon cancer    - REFERRAL TO GASTROENTEROLOGY  - Subsequent Annual Wellness Visit - Includes PPPS ()    11. Need for hepatitis C screening test  Screening labs ordered.  Await results for counseling.    - HEP C VIRUS ANTIBODY; Future  - Subsequent Annual Wellness Visit - Includes PPPS ()    12. Screening for deficiency anemia  Screening labs ordered.  Await results for counseling.    - CBC WITH DIFFERENTIAL; Future  - Subsequent Annual Wellness Visit - Includes PPPS ()    13. Encounter for screening mammogram for breast cancer    - MA-SCREEN MAMMO W/CAD-BILAT; Future  - Subsequent Annual Wellness Visit - Includes PPPS ()      Services suggested: No services needed at this time  Health Care Screening recommendations as per orders if indicated.  Referrals offered: PT/OT/Nutrition counseling/Behavioral Health/Smoking cessation as per orders if indicated.    Discussion today about general wellness and lifestyle habits:    · Prevent falls and reduce trip hazards; Cautioned about securing or removing rugs.  · Have a working fire alarm and carbon monoxide detector;   · Engage in regular physical activity and social activities       Follow-up: Return in about 1 year (around 10/22/2020).

## 2019-10-22 NOTE — PATIENT INSTRUCTIONS
"DASH Eating Plan  DASH stands for \"Dietary Approaches to Stop Hypertension.\" The DASH eating plan is a healthy eating plan that has been shown to reduce high blood pressure (hypertension). Additional health benefits may include reducing the risk of type 2 diabetes mellitus, heart disease, and stroke. The DASH eating plan may also help with weight loss.  What do I need to know about the DASH eating plan?  For the DASH eating plan, you will follow these general guidelines:  · Choose foods with less than 150 milligrams of sodium per serving (as listed on the food label).  · Use salt-free seasonings or herbs instead of table salt or sea salt.  · Check with your health care provider or pharmacist before using salt substitutes.  · Eat lower-sodium products. These are often labeled as \"low-sodium\" or \"no salt added.\"  · Eat fresh foods. Avoid eating a lot of canned foods.  · Eat more vegetables, fruits, and low-fat dairy products.  · Choose whole grains. Look for the word \"whole\" as the first word in the ingredient list.  · Choose fish and skinless chicken or turkey more often than red meat. Limit fish, poultry, and meat to 6 oz (170 g) each day.  · Limit sweets, desserts, sugars, and sugary drinks.  · Choose heart-healthy fats.  · Eat more home-cooked food and less restaurant, buffet, and fast food.  · Limit fried foods.  · Do not sam foods. Cook foods using methods such as baking, boiling, grilling, and broiling instead.  · When eating at a restaurant, ask that your food be prepared with less salt, or no salt if possible.  What foods can I eat?  Seek help from a dietitian for individual calorie needs.  Grains   Whole grain or whole wheat bread. Brown rice. Whole grain or whole wheat pasta. Quinoa, bulgur, and whole grain cereals. Low-sodium cereals. Corn or whole wheat flour tortillas. Whole grain cornbread. Whole grain crackers. Low-sodium crackers.  Vegetables   Fresh or frozen vegetables (raw, steamed, roasted, or " grilled). Low-sodium or reduced-sodium tomato and vegetable juices. Low-sodium or reduced-sodium tomato sauce and paste. Low-sodium or reduced-sodium canned vegetables.  Fruits   All fresh, canned (in natural juice), or frozen fruits.  Meat and Other Protein Products   Ground beef (85% or leaner), grass-fed beef, or beef trimmed of fat. Skinless chicken or turkey. Ground chicken or turkey. Pork trimmed of fat. All fish and seafood. Eggs. Dried beans, peas, or lentils. Unsalted nuts and seeds. Unsalted canned beans.  Dairy   Low-fat dairy products, such as skim or 1% milk, 2% or reduced-fat cheeses, low-fat ricotta or cottage cheese, or plain low-fat yogurt. Low-sodium or reduced-sodium cheeses.  Fats and Oils   Tub margarines without trans fats. Light or reduced-fat mayonnaise and salad dressings (reduced sodium). Avocado. Safflower, olive, or canola oils. Natural peanut or almond butter.  Other   Unsalted popcorn and pretzels.  The items listed above may not be a complete list of recommended foods or beverages. Contact your dietitian for more options.   What foods are not recommended?  Grains   White bread. White pasta. White rice. Refined cornbread. Bagels and croissants. Crackers that contain trans fat.  Vegetables   Creamed or fried vegetables. Vegetables in a cheese sauce. Regular canned vegetables. Regular canned tomato sauce and paste. Regular tomato and vegetable juices.  Fruits   Canned fruit in light or heavy syrup. Fruit juice.  Meat and Other Protein Products   Fatty cuts of meat. Ribs, chicken wings, ramachandran, sausage, bologna, salami, chitterlings, fatback, hot dogs, bratwurst, and packaged luncheon meats. Salted nuts and seeds. Canned beans with salt.  Dairy   Whole or 2% milk, cream, half-and-half, and cream cheese. Whole-fat or sweetened yogurt. Full-fat cheeses or blue cheese. Nondairy creamers and whipped toppings. Processed cheese, cheese spreads, or cheese curds.  Condiments   Onion and garlic  salt, seasoned salt, table salt, and sea salt. Canned and packaged gravies. Worcestershire sauce. Tartar sauce. Barbecue sauce. Teriyaki sauce. Soy sauce, including reduced sodium. Steak sauce. Fish sauce. Oyster sauce. Cocktail sauce. Horseradish. Ketchup and mustard. Meat flavorings and tenderizers. Bouillon cubes. Hot sauce. Tabasco sauce. Marinades. Taco seasonings. Relishes.  Fats and Oils   Butter, stick margarine, lard, shortening, ghee, and ramachandran fat. Coconut, palm kernel, or palm oils. Regular salad dressings.  Other   Pickles and olives. Salted popcorn and pretzels.  The items listed above may not be a complete list of foods and beverages to avoid. Contact your dietitian for more information.   Where can I find more information?  National [employer] [place]: www.nhlbi.nih.gov/health/health-topics/topics/dash/  This information is not intended to replace advice given to you by your health care provider. Make sure you discuss any questions you have with your health care provider.  Document Released: 12/06/2012 Document Revised: 05/25/2017 Document Reviewed: 10/22/2014  Elsevier Interactive Patient Education © 2017 [employer] Inc[employer]

## 2019-10-24 DIAGNOSIS — Z78.0 POST-MENOPAUSE: ICD-10-CM

## 2019-10-24 DIAGNOSIS — E05.90 HYPERTHYROIDISM: ICD-10-CM

## 2019-10-24 DIAGNOSIS — M85.89 OSTEOPENIA OF MULTIPLE SITES: ICD-10-CM

## 2019-10-31 ENCOUNTER — HOSPITAL ENCOUNTER (OUTPATIENT)
Dept: LAB | Facility: MEDICAL CENTER | Age: 69
End: 2019-10-31
Attending: FAMILY MEDICINE
Payer: MEDICARE

## 2019-10-31 DIAGNOSIS — Z13.0 SCREENING FOR DEFICIENCY ANEMIA: ICD-10-CM

## 2019-10-31 DIAGNOSIS — Z11.59 NEED FOR HEPATITIS C SCREENING TEST: ICD-10-CM

## 2019-10-31 DIAGNOSIS — M85.89 OSTEOPENIA OF MULTIPLE SITES: ICD-10-CM

## 2019-10-31 DIAGNOSIS — I10 ESSENTIAL HYPERTENSION: ICD-10-CM

## 2019-10-31 DIAGNOSIS — E78.2 MIXED HYPERLIPIDEMIA: ICD-10-CM

## 2019-10-31 DIAGNOSIS — R73.03 PREDIABETES: ICD-10-CM

## 2019-10-31 LAB
25(OH)D3 SERPL-MCNC: 28 NG/ML (ref 30–100)
ALBUMIN SERPL BCP-MCNC: 4 G/DL (ref 3.2–4.9)
ALBUMIN/GLOB SERPL: 1.4 G/DL
ALP SERPL-CCNC: 62 U/L (ref 30–99)
ALT SERPL-CCNC: 26 U/L (ref 2–50)
ANION GAP SERPL CALC-SCNC: 8 MMOL/L (ref 0–11.9)
AST SERPL-CCNC: 19 U/L (ref 12–45)
BASOPHILS # BLD AUTO: 0.8 % (ref 0–1.8)
BASOPHILS # BLD: 0.04 K/UL (ref 0–0.12)
BILIRUB SERPL-MCNC: 0.6 MG/DL (ref 0.1–1.5)
BUN SERPL-MCNC: 12 MG/DL (ref 8–22)
CALCIUM SERPL-MCNC: 9.2 MG/DL (ref 8.5–10.5)
CHLORIDE SERPL-SCNC: 106 MMOL/L (ref 96–112)
CHOLEST SERPL-MCNC: 201 MG/DL (ref 100–199)
CO2 SERPL-SCNC: 27 MMOL/L (ref 20–33)
CREAT SERPL-MCNC: 0.89 MG/DL (ref 0.5–1.4)
EOSINOPHIL # BLD AUTO: 0.23 K/UL (ref 0–0.51)
EOSINOPHIL NFR BLD: 4.4 % (ref 0–6.9)
ERYTHROCYTE [DISTWIDTH] IN BLOOD BY AUTOMATED COUNT: 47.2 FL (ref 35.9–50)
FASTING STATUS PATIENT QL REPORTED: NORMAL
GLOBULIN SER CALC-MCNC: 2.9 G/DL (ref 1.9–3.5)
GLUCOSE SERPL-MCNC: 88 MG/DL (ref 65–99)
HCT VFR BLD AUTO: 50.1 % (ref 37–47)
HCV AB SER QL: NEGATIVE
HDLC SERPL-MCNC: 43 MG/DL
HGB BLD-MCNC: 16.1 G/DL (ref 12–16)
IMM GRANULOCYTES # BLD AUTO: 0.01 K/UL (ref 0–0.11)
IMM GRANULOCYTES NFR BLD AUTO: 0.2 % (ref 0–0.9)
LDLC SERPL CALC-MCNC: 129 MG/DL
LYMPHOCYTES # BLD AUTO: 1.83 K/UL (ref 1–4.8)
LYMPHOCYTES NFR BLD: 35.2 % (ref 22–41)
MCH RBC QN AUTO: 32.2 PG (ref 27–33)
MCHC RBC AUTO-ENTMCNC: 32.1 G/DL (ref 33.6–35)
MCV RBC AUTO: 100.2 FL (ref 81.4–97.8)
MONOCYTES # BLD AUTO: 0.5 K/UL (ref 0–0.85)
MONOCYTES NFR BLD AUTO: 9.6 % (ref 0–13.4)
NEUTROPHILS # BLD AUTO: 2.59 K/UL (ref 2–7.15)
NEUTROPHILS NFR BLD: 49.8 % (ref 44–72)
NRBC # BLD AUTO: 0 K/UL
NRBC BLD-RTO: 0 /100 WBC
PLATELET # BLD AUTO: 247 K/UL (ref 164–446)
PMV BLD AUTO: 10.6 FL (ref 9–12.9)
POTASSIUM SERPL-SCNC: 4 MMOL/L (ref 3.6–5.5)
PROT SERPL-MCNC: 6.9 G/DL (ref 6–8.2)
RBC # BLD AUTO: 5 M/UL (ref 4.2–5.4)
SODIUM SERPL-SCNC: 141 MMOL/L (ref 135–145)
TRIGL SERPL-MCNC: 144 MG/DL (ref 0–149)
WBC # BLD AUTO: 5.2 K/UL (ref 4.8–10.8)

## 2019-10-31 PROCEDURE — 83036 HEMOGLOBIN GLYCOSYLATED A1C: CPT | Mod: GA

## 2019-10-31 PROCEDURE — 36415 COLL VENOUS BLD VENIPUNCTURE: CPT

## 2019-10-31 PROCEDURE — 82306 VITAMIN D 25 HYDROXY: CPT | Mod: GA

## 2019-10-31 PROCEDURE — 86803 HEPATITIS C AB TEST: CPT

## 2019-10-31 PROCEDURE — 80061 LIPID PANEL: CPT

## 2019-10-31 PROCEDURE — 85025 COMPLETE CBC W/AUTO DIFF WBC: CPT

## 2019-10-31 PROCEDURE — 80053 COMPREHEN METABOLIC PANEL: CPT

## 2019-11-01 LAB
EST. AVERAGE GLUCOSE BLD GHB EST-MCNC: 114 MG/DL
HBA1C MFR BLD: 5.6 % (ref 0–5.6)

## 2019-12-11 ENCOUNTER — HOSPITAL ENCOUNTER (OUTPATIENT)
Dept: RADIOLOGY | Facility: MEDICAL CENTER | Age: 69
End: 2019-12-11
Attending: INTERNAL MEDICINE
Payer: MEDICARE

## 2019-12-11 ENCOUNTER — HOSPITAL ENCOUNTER (OUTPATIENT)
Dept: RADIOLOGY | Facility: MEDICAL CENTER | Age: 69
End: 2019-12-11
Attending: FAMILY MEDICINE
Payer: MEDICARE

## 2019-12-11 DIAGNOSIS — E05.90 HYPERTHYROIDISM: ICD-10-CM

## 2019-12-11 DIAGNOSIS — M85.89 OSTEOPENIA OF MULTIPLE SITES: ICD-10-CM

## 2019-12-11 DIAGNOSIS — Z78.0 POST-MENOPAUSE: ICD-10-CM

## 2019-12-11 DIAGNOSIS — Z12.31 ENCOUNTER FOR SCREENING MAMMOGRAM FOR BREAST CANCER: ICD-10-CM

## 2019-12-11 PROCEDURE — 77080 DXA BONE DENSITY AXIAL: CPT

## 2019-12-11 PROCEDURE — 77063 BREAST TOMOSYNTHESIS BI: CPT

## 2020-01-08 ENCOUNTER — HOSPITAL ENCOUNTER (OUTPATIENT)
Dept: LAB | Facility: MEDICAL CENTER | Age: 70
End: 2020-01-08
Attending: INTERNAL MEDICINE
Payer: MEDICARE

## 2020-01-08 DIAGNOSIS — E05.00 GRAVES DISEASE: ICD-10-CM

## 2020-01-08 DIAGNOSIS — E05.90 HYPERTHYROIDISM: ICD-10-CM

## 2020-01-08 PROCEDURE — 36415 COLL VENOUS BLD VENIPUNCTURE: CPT

## 2020-01-08 PROCEDURE — 83520 IMMUNOASSAY QUANT NOS NONAB: CPT

## 2020-01-08 PROCEDURE — 84439 ASSAY OF FREE THYROXINE: CPT

## 2020-01-08 PROCEDURE — 84481 FREE ASSAY (FT-3): CPT

## 2020-01-08 PROCEDURE — 84443 ASSAY THYROID STIM HORMONE: CPT

## 2020-01-09 LAB
T3FREE SERPL-MCNC: 3.73 PG/ML (ref 2.4–4.2)
T4 FREE SERPL-MCNC: 1.11 NG/DL (ref 0.53–1.43)
TSH SERPL DL<=0.005 MIU/L-ACNC: 0.18 UIU/ML (ref 0.38–5.33)

## 2020-01-12 LAB — TSH RECEP AB SER-ACNC: 2.47 IU/L

## 2020-01-14 ENCOUNTER — OFFICE VISIT (OUTPATIENT)
Dept: ENDOCRINOLOGY | Facility: MEDICAL CENTER | Age: 70
End: 2020-01-14
Payer: MEDICARE

## 2020-01-14 VITALS
OXYGEN SATURATION: 95 % | HEIGHT: 65 IN | HEART RATE: 86 BPM | WEIGHT: 216 LBS | SYSTOLIC BLOOD PRESSURE: 120 MMHG | BODY MASS INDEX: 35.99 KG/M2 | DIASTOLIC BLOOD PRESSURE: 88 MMHG

## 2020-01-14 DIAGNOSIS — E05.00 GRAVES DISEASE: ICD-10-CM

## 2020-01-14 DIAGNOSIS — M85.89 OSTEOPENIA OF MULTIPLE SITES: ICD-10-CM

## 2020-01-14 DIAGNOSIS — E05.90 HYPERTHYROIDISM: ICD-10-CM

## 2020-01-14 PROCEDURE — 99213 OFFICE O/P EST LOW 20 MIN: CPT | Performed by: INTERNAL MEDICINE

## 2020-01-14 NOTE — PROGRESS NOTES
"Chief Complaint   Patient presents with   • Thyrotoxicosis     Graves' disease   • Osteopenia        HPI:    See assessment and recommendations below    ROS:  All other systems reported as negative or unchanged since last exam      Allergies: No Known Allergies    Current medicines including changes today:  Current Outpatient Medications   Medication Sig Dispense Refill   • losartan (COZAAR) 50 MG Tab Take 1 Tab by mouth every day. 90 Tab 3   • methimazole (TAPAZOLE) 5 MG Tab Take 0.5 Tabs by mouth every 48 hours. 30 Tab 3   • Calcium Citrate (CITRACAL PO) Take 1,200 Units by mouth.     • non-formulary med Vibrant Detox Tablet - 3 tabs at night     • DIGESTIVE ENZYMES PO Take  by mouth.     • ascorbic acid (ASCORBIC ACID) 500 MG Tab Take 500 mg by mouth every day.     • acetaminophen (TYLENOL) 500 MG Tab Take 500-1,000 mg by mouth every 6 hours as needed.     • FLUZONE QUADRIVALENT 0.5 ML Suspension injection 0.5 mL by Injection route.     • NON SPECIFIED 4 wheel walker with seat 1 Each 0   • celecoxib (CELEBREX) 200 MG Cap Take 200 mg by mouth 2 times a day.     • MAGNESIUM PO Take 250 mg by mouth every day.     • aspirin (ASA) 81 MG Chew Tab chewable tablet Take 81 mg by mouth every day.     • vitamin D (CHOLECALCIFEROL) 1000 UNIT Tab Take 1,000 Units by mouth every day.       No current facility-administered medications for this visit.         Past Medical History:   Diagnosis Date   • Hyperlipidemia    • Hypertension    • Hyperthyroidism     Graves' disease / thyrotropin receptor antibody = 6.2       PHYSICAL EXAM:    /88 (BP Location: Left arm, Patient Position: Sitting, BP Cuff Size: Adult)   Pulse 86   Ht 1.64 m (5' 4.57\")   Wt 98 kg (216 lb)   SpO2 95%   BMI 36.43 kg/m²     Gen. obese but appears healthy in no distress             Weight is unchanged from 6 months ago but working out regularly.  Might be substituting some muscle for adipose    Skin   appropriate for sex and age    HEENT no eye " signs of Graves' disease    Neck   thyroid gland is relatively small and difficult to palpate    Lungs  clear    Heart  regular    Extremities  no edema    Neuro  gait and station normal, no tremor    Psych  appropriate, calm, pleasant.  Good spirits    ASSESSMENT AND RECOMMENDATIONS    1. Hyperthyroidism             Clinically euthyroid taking methimazole 2.5 mg 2 days a week              Current TSH 0.18 and free T4 mid range at 1.1 and free T3 high normal at 3.7.              She would prefer not to change her methimazole dose.               We are waiting for remission    2. Graves disease              No eye involvement              Current thyrotropin receptor antibody has gone down from 3.8 down to 3.1 and now 2.4 ( <1.7).    3. Osteopenia of multiple sites             Patient is exercising regularly including weights.  Also taking calcium and vitamin D supplements.  Last knee level was October at 28.                  Bone density done last month indicates a 5.6% increase in the lumbar spine and 3.3% increase in the hip.  T score in the spine -0.8 and at the hip -1.1      DISPOSITION: Repeat blood test in 3 months and report by phone                            Return to clinic in 6 months      Denny Ireland M.D.    Copies to: Maryse James M.D. [phone]

## 2020-06-29 DIAGNOSIS — E05.90 HYPERTHYROIDISM: ICD-10-CM

## 2020-06-29 NOTE — TELEPHONE ENCOUNTER
Received request via: Pharmacy    Was the patient seen in the last year in this department? Yes10/22/19    Does the patient have an active prescription (recently filled or refills available) for medication(s) requested? No

## 2020-06-30 RX ORDER — METHIMAZOLE 5 MG/1
2.5 TABLET ORAL
Qty: 30 TAB | Refills: 3 | Status: SHIPPED | OUTPATIENT
Start: 2020-06-30 | End: 2021-08-31 | Stop reason: SDUPTHER

## 2020-07-09 ENCOUNTER — HOSPITAL ENCOUNTER (OUTPATIENT)
Dept: LAB | Facility: MEDICAL CENTER | Age: 70
End: 2020-07-09
Attending: INTERNAL MEDICINE
Payer: MEDICARE

## 2020-07-09 DIAGNOSIS — E05.00 GRAVES DISEASE: ICD-10-CM

## 2020-07-09 DIAGNOSIS — E05.90 HYPERTHYROIDISM: ICD-10-CM

## 2020-07-09 LAB
T3FREE SERPL-MCNC: 3.73 PG/ML (ref 2–4.4)
T4 FREE SERPL-MCNC: 1.65 NG/DL (ref 0.93–1.7)
TSH SERPL DL<=0.005 MIU/L-ACNC: 0.01 UIU/ML (ref 0.38–5.33)

## 2020-07-09 PROCEDURE — 84481 FREE ASSAY (FT-3): CPT

## 2020-07-09 PROCEDURE — 83520 IMMUNOASSAY QUANT NOS NONAB: CPT

## 2020-07-09 PROCEDURE — 36415 COLL VENOUS BLD VENIPUNCTURE: CPT

## 2020-07-09 PROCEDURE — 84439 ASSAY OF FREE THYROXINE: CPT

## 2020-07-09 PROCEDURE — 84443 ASSAY THYROID STIM HORMONE: CPT

## 2020-07-12 LAB — TSH RECEP AB SER-ACNC: 5.65 IU/L

## 2020-07-14 ENCOUNTER — OFFICE VISIT (OUTPATIENT)
Dept: ENDOCRINOLOGY | Facility: MEDICAL CENTER | Age: 70
End: 2020-07-14
Attending: INTERNAL MEDICINE
Payer: MEDICARE

## 2020-07-14 VITALS
BODY MASS INDEX: 35.65 KG/M2 | HEIGHT: 65 IN | HEART RATE: 120 BPM | SYSTOLIC BLOOD PRESSURE: 160 MMHG | DIASTOLIC BLOOD PRESSURE: 106 MMHG | WEIGHT: 214 LBS

## 2020-07-14 DIAGNOSIS — E05.90 HYPERTHYROIDISM: ICD-10-CM

## 2020-07-14 DIAGNOSIS — E05.00 GRAVES DISEASE: ICD-10-CM

## 2020-07-14 DIAGNOSIS — M85.89 OSTEOPENIA OF MULTIPLE SITES: ICD-10-CM

## 2020-07-14 PROCEDURE — 99213 OFFICE O/P EST LOW 20 MIN: CPT | Performed by: INTERNAL MEDICINE

## 2020-07-14 ASSESSMENT — FIBROSIS 4 INDEX: FIB4 SCORE: 1.04

## 2020-07-14 NOTE — PROGRESS NOTES
Chief Complaint   Patient presents with   • Thyrotoxicosis     Graves' disease        HPI:    Thyrotoxicosis        Feeling a bit more stress and irritability  presumably related to COVID-19 and all the other distress occurring almost daily.  However free T4  level is up a little bit and TSH down a little bit.  She will increase her methimazole to 2.5 mg 3 days a week and monitor levels monthly.        She was feeling rushed coming into the office.  Her heart rate settled from 120 down to 100 while I was examining her.    ROS:  All other systems reported as negative or unchanged since last exam      Allergies: No Known Allergies    Current medicines including changes today:  Current Outpatient Medications   Medication Sig Dispense Refill   • methimazole (TAPAZOLE) 5 MG Tab Take 0.5 Tabs by mouth every 48 hours. (Patient taking differently: Take 2.5 mg by mouth every 72 hours.) 30 Tab 3   • losartan (COZAAR) 50 MG Tab Take 1 Tab by mouth every day. 90 Tab 3   • Calcium Citrate (CITRACAL PO) Take 1,200 Units by mouth.     • DIGESTIVE ENZYMES PO Take  by mouth.     • ascorbic acid (ASCORBIC ACID) 500 MG Tab Take 500 mg by mouth every day.     • vitamin D (CHOLECALCIFEROL) 1000 UNIT Tab Take 1,000 Units by mouth every day.     • FLUZONE QUADRIVALENT 0.5 ML Suspension injection 0.5 mL by Injection route.     • NON SPECIFIED 4 wheel walker with seat 1 Each 0   • celecoxib (CELEBREX) 200 MG Cap Take 200 mg by mouth 2 times a day.     • non-formulary med Vibrant Detox Tablet - 3 tabs at night     • MAGNESIUM PO Take 250 mg by mouth every day.     • aspirin (ASA) 81 MG Chew Tab chewable tablet Take 81 mg by mouth every day.     • acetaminophen (TYLENOL) 500 MG Tab Take 500-1,000 mg by mouth every 6 hours as needed.       No current facility-administered medications for this visit.         Past Medical History:   Diagnosis Date   • Hyperlipidemia    • Hypertension    • Hyperthyroidism     Graves' disease / thyrotropin  "receptor antibody = 6.2       PHYSICAL EXAM:    /106 (BP Location: Left arm, Patient Position: Sitting, BP Cuff Size: Adult)   Pulse (!) 120   Ht 1.64 m (5' 4.57\")   Wt 97.1 kg (214 lb)   BMI 36.09 kg/m²   Heart rate during my examination is 100 and regular    Gen. obese but otherwise appears healthy     Skin   appropriate for sex and age    HEENT     no eye signs of Graves' disease    Neck   thyroid gland about normal size      Heart  regular    Extremities  no edema    Neuro  gait and station normal, no tremor of the hands but she feels a slight tremor internally    Psych  appropriate      ASSESSMENT AND RECOMMENDATIONS    1. Hyperthyroidism             Patient still does not want either I-131 or thyroidectomy.  She wishes to continue methimazole to see if she will go in remission.  In this regard her antibody titer has gone up to 2.4 up to 5.6    2. Graves disease             No significant ophthalmopathy    3. Osteopenia             Actually her last bone density was normal.  Bone loss is a concern with ongoing thyrotoxicosis.  She is not experiencing that.         DISPOSITION: Monitor blood levels every 1 to 2 months.  Return to office in 4 months      Denny Ireland M.D.    Copies to: Maryse James M.D. [phone]  "

## 2020-10-23 ENCOUNTER — TELEPHONE (OUTPATIENT)
Dept: MEDICAL GROUP | Facility: LAB | Age: 70
End: 2020-10-23

## 2020-10-23 NOTE — TELEPHONE ENCOUNTER
ANNUAL WELLNESS VISIT PRE-VISIT PLANNING WITHOUT OUTREACH    1.  Reviewed note from last office visit with PCP: YES    2.  If any orders were placed at last visit, do we have Results/Consult Notes?        •  Labs - Labs were not ordered at last office visit.  Note: If patient appointment is for lab review and patient did not complete labs, check with provider if OK to reschedule patient until labs completed.       •  Imaging - Imaging was not ordered at last office visit.       •  Referrals - No referrals were ordered at last office visit.    3.  Immunizations were updated in [facility] using WebIZ?: Yes       •  WebIZ Recommendations: Patient is up to date on all vaccines        •  Is patient due for Tdap? NO       •  Is patient due for Shingrix? NO     4.  Patient is due for the following Health Maintenance Topics:   Health Maintenance Due   Topic Date Due   • Annual Wellness Visit  10/22/2020       - Patient already has appointment scheduled for Annual Wellness Visit (AWV).    5.  Reviewed/Updated the following with patient:       •   Preferred Pharmacy? YES       •   Preferred Lab? YES       •   Preferred Communication? YES       •   Allergies? YES       •   Medications? YES. Was Abstract Encounter opened and chart updated? YES       •   Social History? YES. Was Abstract Encounter opened and chart updated? YES       •   Family History (document living status of immediate family members and if + hx of  cancer, diabetes, hypertension, hyperlipidemia, heart attack, stroke) YES. Was Abstract Encounter opened and chart updated? YES    6.  Care Team Updated:       •   DME Company (gait device, O2, CPAP, etc.): YES       •   Other Specialists (eye doctor, derm, GYN, cardiology, endo, etc): YES    7. Orders for overdue Health Maintenance topics pended in Pre-Charting? NO    8.  Patient has the following Care Path diagnoses on Problem List:  NONE    9.  Patient was advised: “This is a free wellness visit. The provider will  screen for medical conditions to help you stay healthy. If you have other concerns to address you may be asked to discuss these at a separate visit or there may be an additional fee.”     10.  Patient was informed to arrive 15 min prior to their scheduled appointment and bring in their medication bottles.

## 2020-10-27 ENCOUNTER — OFFICE VISIT (OUTPATIENT)
Dept: MEDICAL GROUP | Facility: LAB | Age: 70
End: 2020-10-27
Payer: MEDICARE

## 2020-10-27 ENCOUNTER — HOSPITAL ENCOUNTER (OUTPATIENT)
Dept: LAB | Facility: MEDICAL CENTER | Age: 70
End: 2020-10-27
Attending: FAMILY MEDICINE
Payer: MEDICARE

## 2020-10-27 VITALS
OXYGEN SATURATION: 92 % | TEMPERATURE: 97.9 F | SYSTOLIC BLOOD PRESSURE: 140 MMHG | RESPIRATION RATE: 16 BRPM | WEIGHT: 213 LBS | BODY MASS INDEX: 35.49 KG/M2 | HEIGHT: 65 IN | DIASTOLIC BLOOD PRESSURE: 90 MMHG | HEART RATE: 94 BPM

## 2020-10-27 DIAGNOSIS — R73.03 PREDIABETES: ICD-10-CM

## 2020-10-27 DIAGNOSIS — I10 ESSENTIAL HYPERTENSION: ICD-10-CM

## 2020-10-27 DIAGNOSIS — E66.9 CLASS 2 OBESITY WITHOUT SERIOUS COMORBIDITY WITH BODY MASS INDEX (BMI) OF 35.0 TO 35.9 IN ADULT, UNSPECIFIED OBESITY TYPE: ICD-10-CM

## 2020-10-27 DIAGNOSIS — Z00.00 MEDICARE ANNUAL WELLNESS VISIT, SUBSEQUENT: ICD-10-CM

## 2020-10-27 DIAGNOSIS — E05.90 HYPERTHYROIDISM: ICD-10-CM

## 2020-10-27 DIAGNOSIS — E78.2 MIXED HYPERLIPIDEMIA: ICD-10-CM

## 2020-10-27 DIAGNOSIS — E55.9 VITAMIN D DEFICIENCY: ICD-10-CM

## 2020-10-27 DIAGNOSIS — D58.2 ELEVATED HEMOGLOBIN (HCC): ICD-10-CM

## 2020-10-27 PROBLEM — E66.812 CLASS 2 OBESITY WITHOUT SERIOUS COMORBIDITY WITH BODY MASS INDEX (BMI) OF 35.0 TO 35.9 IN ADULT: Status: ACTIVE | Noted: 2017-03-01

## 2020-10-27 LAB
25(OH)D3 SERPL-MCNC: 42 NG/ML (ref 30–100)
ALBUMIN SERPL BCP-MCNC: 4.1 G/DL (ref 3.2–4.9)
ALBUMIN/GLOB SERPL: 1.3 G/DL
ALP SERPL-CCNC: 69 U/L (ref 30–99)
ALT SERPL-CCNC: 16 U/L (ref 2–50)
ANION GAP SERPL CALC-SCNC: 7 MMOL/L (ref 7–16)
AST SERPL-CCNC: 18 U/L (ref 12–45)
BASOPHILS # BLD AUTO: 1 % (ref 0–1.8)
BASOPHILS # BLD: 0.05 K/UL (ref 0–0.12)
BILIRUB SERPL-MCNC: 0.5 MG/DL (ref 0.1–1.5)
BUN SERPL-MCNC: 16 MG/DL (ref 8–22)
CALCIUM SERPL-MCNC: 9.7 MG/DL (ref 8.5–10.5)
CHLORIDE SERPL-SCNC: 104 MMOL/L (ref 96–112)
CHOLEST SERPL-MCNC: 195 MG/DL (ref 100–199)
CO2 SERPL-SCNC: 29 MMOL/L (ref 20–33)
CREAT SERPL-MCNC: 0.82 MG/DL (ref 0.5–1.4)
EOSINOPHIL # BLD AUTO: 0.22 K/UL (ref 0–0.51)
EOSINOPHIL NFR BLD: 4.2 % (ref 0–6.9)
ERYTHROCYTE [DISTWIDTH] IN BLOOD BY AUTOMATED COUNT: 46.7 FL (ref 35.9–50)
EST. AVERAGE GLUCOSE BLD GHB EST-MCNC: 117 MG/DL
FASTING STATUS PATIENT QL REPORTED: NORMAL
GLOBULIN SER CALC-MCNC: 3.2 G/DL (ref 1.9–3.5)
GLUCOSE SERPL-MCNC: 89 MG/DL (ref 65–99)
HBA1C MFR BLD: 5.7 % (ref 0–5.6)
HCT VFR BLD AUTO: 51.2 % (ref 37–47)
HDLC SERPL-MCNC: 47 MG/DL
HGB BLD-MCNC: 16.3 G/DL (ref 12–16)
IMM GRANULOCYTES # BLD AUTO: 0.01 K/UL (ref 0–0.11)
IMM GRANULOCYTES NFR BLD AUTO: 0.2 % (ref 0–0.9)
LDLC SERPL CALC-MCNC: 130 MG/DL
LYMPHOCYTES # BLD AUTO: 1.59 K/UL (ref 1–4.8)
LYMPHOCYTES NFR BLD: 30.2 % (ref 22–41)
MCH RBC QN AUTO: 31 PG (ref 27–33)
MCHC RBC AUTO-ENTMCNC: 31.8 G/DL (ref 33.6–35)
MCV RBC AUTO: 97.3 FL (ref 81.4–97.8)
MONOCYTES # BLD AUTO: 0.55 K/UL (ref 0–0.85)
MONOCYTES NFR BLD AUTO: 10.5 % (ref 0–13.4)
NEUTROPHILS # BLD AUTO: 2.84 K/UL (ref 2–7.15)
NEUTROPHILS NFR BLD: 53.9 % (ref 44–72)
NRBC # BLD AUTO: 0 K/UL
NRBC BLD-RTO: 0 /100 WBC
PLATELET # BLD AUTO: 230 K/UL (ref 164–446)
PMV BLD AUTO: 10.4 FL (ref 9–12.9)
POTASSIUM SERPL-SCNC: 4.4 MMOL/L (ref 3.6–5.5)
PROT SERPL-MCNC: 7.3 G/DL (ref 6–8.2)
RBC # BLD AUTO: 5.26 M/UL (ref 4.2–5.4)
SODIUM SERPL-SCNC: 140 MMOL/L (ref 135–145)
TRIGL SERPL-MCNC: 91 MG/DL (ref 0–149)
WBC # BLD AUTO: 5.3 K/UL (ref 4.8–10.8)

## 2020-10-27 PROCEDURE — 83036 HEMOGLOBIN GLYCOSYLATED A1C: CPT | Mod: GA

## 2020-10-27 PROCEDURE — 36415 COLL VENOUS BLD VENIPUNCTURE: CPT

## 2020-10-27 PROCEDURE — 80061 LIPID PANEL: CPT

## 2020-10-27 PROCEDURE — 82306 VITAMIN D 25 HYDROXY: CPT

## 2020-10-27 PROCEDURE — G0439 PPPS, SUBSEQ VISIT: HCPCS | Performed by: FAMILY MEDICINE

## 2020-10-27 PROCEDURE — 80053 COMPREHEN METABOLIC PANEL: CPT

## 2020-10-27 PROCEDURE — 85025 COMPLETE CBC W/AUTO DIFF WBC: CPT

## 2020-10-27 ASSESSMENT — ACTIVITIES OF DAILY LIVING (ADL): BATHING_REQUIRES_ASSISTANCE: 0

## 2020-10-27 ASSESSMENT — ENCOUNTER SYMPTOMS: GENERAL WELL-BEING: EXCELLENT

## 2020-10-27 ASSESSMENT — PATIENT HEALTH QUESTIONNAIRE - PHQ9: CLINICAL INTERPRETATION OF PHQ2 SCORE: 0

## 2020-10-27 ASSESSMENT — FIBROSIS 4 INDEX: FIB4 SCORE: 1.04

## 2020-10-27 NOTE — PROGRESS NOTES
Chief Complaint   Patient presents with   • Annual Wellness Visit         HPI:  Jasiel Garrett is a 70 y.o. here for Medicare Annual Wellness Visit        Patient Active Problem List    Diagnosis Date Noted   • Vitamin D deficiency 10/27/2020   • Post-menopause 10/24/2019   • History of colonic polyps 10/22/2019   • Osteopenia of multiple sites 10/12/2019   • Prediabetes 07/31/2018   • Graves disease 04/05/2018   • Hyperthyroidism 07/14/2017   • Mixed hyperlipidemia 03/01/2017   • Essential hypertension 03/01/2017   • Class 2 obesity without serious comorbidity with body mass index (BMI) of 35.0 to 35.9 in adult 03/01/2017   • Primary osteoarthritis of both knees 03/01/2017       Current Outpatient Medications   Medication Sig Dispense Refill   • Turmeric Curcumin 500 MG Cap Take  by mouth.     • losartan (COZAAR) 50 MG Tab TAKE ONE TABLET BY MOUTH EVERY DAY 90 Tab 0   • methimazole (TAPAZOLE) 5 MG Tab Take 0.5 Tabs by mouth every 48 hours. (Patient taking differently: Take 2.5 mg by mouth every 72 hours.) 30 Tab 3   • Calcium Citrate (CITRACAL PO) Take 1,200 Units by mouth.     • DIGESTIVE ENZYMES PO Take  by mouth.     • ascorbic acid (ASCORBIC ACID) 500 MG Tab Take 500 mg by mouth every day.     • vitamin D (CHOLECALCIFEROL) 1000 UNIT Tab Take 1,000 Units by mouth every day.       No current facility-administered medications for this visit.         Patient is taking medications as noted in medication list.  Current supplements as per medication list.     Allergies: Patient has no known allergies.    Current social contact/activities: emailing and calling family     Is patient current with immunizations? Yes.    She  reports that she quit smoking about 23 years ago. She has a 10.00 pack-year smoking history. She has never used smokeless tobacco. She reports current alcohol use of about 1.2 oz of alcohol per week. She reports that she does not use drugs.  Counseling given: Not Answered        DPA/Advanced directive:  Patient has Advanced Directive on file.     ROS:    Gait: Uses no assistive device   Ostomy: No   Other tubes: No   Amputations: No   Chronic oxygen use No   Last eye exam October 2020   Wears hearing aids: No   : Reports urinary leakage during the last 6 months that has not interfered at all with their daily activities or sleep.      Screening:        Depression Screening    Little interest or pleasure in doing things?  0 - not at all  Feeling down, depressed, or hopeless? 0 - not at all  Trouble falling or staying asleep, or sleeping too much?     Feeling tired or having little energy?     Poor appetite or overeating?     Feeling bad about yourself - or that you are a failure or have let yourself or your family down?    Trouble concentrating on things, such as reading the newspaper or watching television?    Moving or speaking so slowly that other people could have noticed.  Or the opposite - being so fidgety or restless that you have been moving around a lot more than usual?     Thoughts that you would be better off dead, or of hurting yourself?     Patient Health Questionnaire Score:        If depressive symptoms identified deferred to follow up visit unless specifically addressed in assessment and plan.    Interpretation of PHQ-9 Total Score   Score Severity   1-4 No Depression   5-9 Mild Depression   10-14 Moderate Depression   15-19 Moderately Severe Depression   20-27 Severe Depression      Screening for Cognitive Impairment    Three Minute Recall (river, regina, finger)  3/3    Draw clock face with all 12 numbers and set the hands to show 10 past 11.  Yes    If cognitive concerns identified, deferred for follow up unless specifically addressed in assessment and plan.    Fall Risk Assessment    Has the patient had two or more falls in the last year or any fall with injury in the last year?  No  If fall risk identified, deferred for follow up unless specifically addressed in assessment and plan.      Safety  Assessment    Throw rugs on floor.  No  Handrails on all stairs.  Yes  Good lighting in all hallways.  Yes  Difficulty hearing.  No  Patient counseled about all safety risks that were identified.    Functional Assessment ADLs    Are there any barriers preventing you from cooking for yourself or meeting nutritional needs?  No.    Are there any barriers preventing you from driving safely or obtaining transportation?  No.    Are there any barriers preventing you from using a telephone or calling for help?  No.    Are there any barriers preventing you from shopping?  No.    Are there any barriers preventing you from taking care of your own finances?  No.    Are there any barriers preventing you from managing your medications?    No.    Are there any barriers preventing you from showering, bathing or dressing yourself?  No.    Are you currently engaging in any exercise or physical activity?  Yes.  Bike and strength training  What is your perception of your health?  Excellent.    Health Maintenance Summary                Annual Wellness Visit Overdue 10/22/2020      Done 10/22/2019 SUBSEQUENT ANNUAL WELLNESS VISIT-INCLUDES PPPS ()     Patient has more history with this topic...    IMM DTaP/Tdap/Td Vaccine Postponed 10/23/2021 Originally 4/10/1969. Patient Refused    MAMMOGRAM Next Due 12/11/2021      Done 12/11/2019 MA-SCREENING MAMMO BILAT W/TOMOSYNTHESIS W/CAD     Patient has more history with this topic...    COLONOSCOPY Next Due 11/15/2022      Done 11/15/2019 REFERRAL TO GI FOR COLONOSCOPY     Patient has more history with this topic...    BONE DENSITY Next Due 12/11/2024      Done 12/11/2019 DS-BONE DENSITY STUDY (DEXA)     Patient has more history with this topic...          Patient Care Team:  Maryse James M.D. as PCP - General (Family Medicine)  Denny Ireland M.D. (Endocrinology)      Social History     Tobacco Use   • Smoking status: Former Smoker     Packs/day: 0.50     Years: 20.00     Pack years:  "10.00     Quit date: 3/1/1997     Years since quittin.6   • Smokeless tobacco: Never Used   Substance Use Topics   • Alcohol use: Yes     Alcohol/week: 1.2 oz     Types: 2 Glasses of wine per week   • Drug use: No     Family History   Problem Relation Age of Onset   • Arthritis Mother    • Heart Disease Father         arteriosclerosis   • Alcohol/Drug Sister    • Alcohol/Drug Brother      She  has a past medical history of Hyperlipidemia, Hypertension, and Hyperthyroidism. She also has no past medical history of Encounter for long-term (current) use of other medications.   Past Surgical History:   Procedure Laterality Date   • HYSTEROSCOPY WITH VIDEO DIAGNOSTIC           Exam:     /90 (BP Location: Right arm, Patient Position: Sitting, BP Cuff Size: Adult)   Pulse 94   Temp 36.6 °C (97.9 °F) (Temporal)   Resp 16   Ht 1.651 m (5' 5\")   Wt 96.6 kg (213 lb)   SpO2 92%  Body mass index is 35.45 kg/m².    Hearing good.    Dentition good  Alert, oriented in no acute distress.  Eye contact is good, speech goal directed, affect calm  Constitutional: Alert, no distress.  Skin: Warm, dry, good turgor, no rashes in visible areas.  Eye: Equal, round and reactive, conjunctiva clear, lids normal.  ENMT: Are normal.  TMs are clear on the left.  Right canal is impacted with cerumen  Neck: Trachea midline, no masses, no thyromegaly. No cervical or supraclavicular lymphadenopathy  Respiratory: Unlabored respiratory effort, lungs clear to auscultation, no wheezes, no ronchi.  Cardiovascular: Normal S1, S2, RRR, no murmur, no edema.  Abdomen: Soft, non-tender, no masses, no hepatosplenomegaly.  Psych: Alert and oriented x3, normal affect and mood.        Assessment and Plan. The following treatment and monitoring plan is recommended:    1. Medicare annual wellness visit, subsequent  Routine anticipatory guidance.  No special services needed at this time  - Subsequent Annual Wellness Visit - Includes PPPS ()    2. " Essential hypertension  This is a chronic medical condition that is currently stable  Continue current meds  - Comp Metabolic Panel; Future  - Subsequent Annual Wellness Visit - Includes PPPS ()    3. Mixed hyperlipidemia  Dietary counseling done  - Lipid Profile; Future  - Subsequent Annual Wellness Visit - Includes PPPS ()    4. Hyperthyroidism  Follow up with endocrinology as scheduled  - Subsequent Annual Wellness Visit - Includes PPPS ()    5. Prediabetes  Check labs.  Await results  - Comp Metabolic Panel; Future  - HEMOGLOBIN A1C; Future  - Subsequent Annual Wellness Visit - Includes PPPS ()    6. Vitamin D deficiency  Check labs and adjust vitamin D level   - VITAMIN D,25 HYDROXY; Future  - Subsequent Annual Wellness Visit - Includes PPPS ()    7. Elevated hemoglobin (HCC)    - CBC WITH DIFFERENTIAL; Future  - Subsequent Annual Wellness Visit - Includes PPPS ()    8. Class 2 obesity without serious comorbidity with body mass index (BMI) of 35.0 to 35.9 in adult, unspecified obesity type    - Patient identified as having weight management issue.  Appropriate orders and counseling given.  - Subsequent Annual Wellness Visit - Includes PPPS ()  9.  Impacted cerumen  Ear wash done with good effect.      Services suggested: No services needed at this time  Health Care Screening recommendations as per orders if indicated.  Referrals offered: PT/OT/Nutrition counseling/Behavioral Health/Smoking cessation as per orders if indicated.    Discussion today about general wellness and lifestyle habits:    · Prevent falls and reduce trip hazards; Cautioned about securing or removing rugs.  · Have a working fire alarm and carbon monoxide detector;   · Engage in regular physical activity and social activities       Follow-up: Return in about 1 year (around 10/27/2021).

## 2020-10-27 NOTE — PATIENT INSTRUCTIONS
Earwax Buildup, Adult  The ears produce a substance called earwax that helps keep bacteria out of the ear and protects the skin in the ear canal. Occasionally, earwax can build up in the ear and cause discomfort or hearing loss.  What increases the risk?  This condition is more likely to develop in people who:  · Are male.  · Are elderly.  · Naturally produce more earwax.  · Clean their ears often with cotton swabs.  · Use earplugs often.  · Use in-ear headphones often.  · Wear hearing aids.  · Have narrow ear canals.  · Have earwax that is overly thick or sticky.  · Have eczema.  · Are dehydrated.  · Have excess hair in the ear canal.  What are the signs or symptoms?  Symptoms of this condition include:  · Reduced or muffled hearing.  · A feeling of fullness in the ear or feeling that the ear is plugged.  · Fluid coming from the ear.  · Ear pain.  · Ear itch.  · Ringing in the ear.  · Coughing.  · An obvious piece of earwax that can be seen inside the ear canal.  How is this diagnosed?  This condition may be diagnosed based on:  · Your symptoms.  · Your medical history.  · An ear exam. During the exam, your health care provider will look into your ear with an instrument called an otoscope.  You may have tests, including a hearing test.  How is this treated?  This condition may be treated by:  · Using ear drops to soften the earwax.  · Having the earwax removed by a health care provider. The health care provider may:  ? Flush the ear with water.  ? Use an instrument that has a loop on the end (curette).  ? Use a suction device.  · Surgery to remove the wax buildup. This may be done in severe cases.  Follow these instructions at home:    · Take over-the-counter and prescription medicines only as told by your health care provider.  · Do not put any objects, including cotton swabs, into your ear. You can clean the opening of your ear canal with a washcloth or facial tissue.  · Follow instructions from your health care  provider about cleaning your ears. Do not over-clean your ears.  · Drink enough fluid to keep your urine clear or pale yellow. This will help to thin the earwax.  · Keep all follow-up visits as told by your health care provider. If earwax builds up in your ears often or if you use hearing aids, consider seeing your health care provider for routine, preventive ear cleanings. Ask your health care provider how often you should schedule your cleanings.  · If you have hearing aids, clean them according to instructions from the  and your health care provider.  Contact a health care provider if:  · You have ear pain.  · You develop a fever.  · You have blood, pus, or other fluid coming from your ear.  · You have hearing loss.  · You have ringing in your ears that does not go away.  · Your symptoms do not improve with treatment.  · You feel like the room is spinning (vertigo).  Summary  · Earwax can build up in the ear and cause discomfort or hearing loss.  · The most common symptoms of this condition include reduced or muffled hearing and a feeling of fullness in the ear or feeling that the ear is plugged.  · This condition may be diagnosed based on your symptoms, your medical history, and an ear exam.  · This condition may be treated by using ear drops to soften the earwax or by having the earwax removed by a health care provider.  · Do not put any objects, including cotton swabs, into your ear. You can clean the opening of your ear canal with a washcloth or facial tissue.  This information is not intended to replace advice given to you by your health care provider. Make sure you discuss any questions you have with your health care provider.  Document Released: 01/25/2006 Document Revised: 11/30/2018 Document Reviewed: 02/28/2018  [employer] Patient Education © 2020 [employer]  Health Maintenance After Age 65  After age 65, you are at a higher risk for certain long-term diseases and infections as well as  injuries from falls. Falls are a major cause of broken bones and head injuries in people who are older than age 65. Getting regular preventive care can help to keep you healthy and well. Preventive care includes getting regular testing and making lifestyle changes as recommended by your health care provider. Talk with your health care provider about:  · Which screenings and tests you should have. A screening is a test that checks for a disease when you have no symptoms.  · A diet and exercise plan that is right for you.  What should I know about screenings and tests to prevent falls?  Screening and testing are the best ways to find a health problem early. Early diagnosis and treatment give you the best chance of managing medical conditions that are common after age 65. Certain conditions and lifestyle choices may make you more likely to have a fall. Your health care provider may recommend:  · Regular vision checks. Poor vision and conditions such as cataracts can make you more likely to have a fall. If you wear glasses, make sure to get your prescription updated if your vision changes.  · Medicine review. Work with your health care provider to regularly review all of the medicines you are taking, including over-the-counter medicines. Ask your health care provider about any side effects that may make you more likely to have a fall. Tell your health care provider if any medicines that you take make you feel dizzy or sleepy.  · Osteoporosis screening. Osteoporosis is a condition that causes the bones to get weaker. This can make the bones weak and cause them to break more easily.  · Blood pressure screening. Blood pressure changes and medicines to control blood pressure can make you feel dizzy.  · Strength and balance checks. Your health care provider may recommend certain tests to check your strength and balance while standing, walking, or changing positions.  · Foot health exam. Foot pain and numbness, as well as not  wearing proper footwear, can make you more likely to have a fall.  · Depression screening. You may be more likely to have a fall if you have a fear of falling, feel emotionally low, or feel unable to do activities that you used to do.  · Alcohol use screening. Using too much alcohol can affect your balance and may make you more likely to have a fall.  What actions can I take to lower my risk of falls?  General instructions  · Talk with your health care provider about your risks for falling. Tell your health care provider if:  ? You fall. Be sure to tell your health care provider about all falls, even ones that seem minor.  ? You feel dizzy, sleepy, or off-balance.  · Take over-the-counter and prescription medicines only as told by your health care provider. These include any supplements.  · Eat a healthy diet and maintain a healthy weight. A healthy diet includes low-fat dairy products, low-fat (lean) meats, and fiber from whole grains, beans, and lots of fruits and vegetables.  Home safety  · Remove any tripping hazards, such as rugs, cords, and clutter.  · Install safety equipment such as grab bars in bathrooms and safety rails on stairs.  · Keep rooms and walkways well-lit.  Activity    · Follow a regular exercise program to stay fit. This will help you maintain your balance. Ask your health care provider what types of exercise are appropriate for you.  · If you need a cane or walker, use it as recommended by your health care provider.  · Wear supportive shoes that have nonskid soles.  Lifestyle  · Do not drink alcohol if your health care provider tells you not to drink.  · If you drink alcohol, limit how much you have:  ? 0-1 drink a day for women.  ? 0-2 drinks a day for men.  · Be aware of how much alcohol is in your drink. In the [place] one drink equals one typical bottle of beer (12 oz), one-half glass of wine (5 oz), or one shot of hard liquor (1½ oz).  · Do not use any products that contain nicotine or  tobacco, such as cigarettes and e-cigarettes. If you need help quitting, ask your health care provider.  Summary  · Having a healthy lifestyle and getting preventive care can help to protect your health and wellness after age 65.  · Screening and testing are the best way to find a health problem early and help you avoid having a fall. Early diagnosis and treatment give you the best chance for managing medical conditions that are more common for people who are older than age 65.  · Falls are a major cause of broken bones and head injuries in people who are older than age 65. Take precautions to prevent a fall at home.  · Work with your health care provider to learn what changes you can make to improve your health and wellness and to prevent falls.  This information is not intended to replace advice given to you by your health care provider. Make sure you discuss any questions you have with your health care provider.  Document Released: 10/31/2018 Document Revised: 04/09/2020 Document Reviewed: 10/31/2018  [employer] Patient Education © 2020 [employer]

## 2020-10-29 DIAGNOSIS — E05.00 GRAVES DISEASE: ICD-10-CM

## 2020-10-29 DIAGNOSIS — E05.90 HYPERTHYROIDISM: ICD-10-CM

## 2020-11-12 ENCOUNTER — HOSPITAL ENCOUNTER (OUTPATIENT)
Dept: LAB | Facility: MEDICAL CENTER | Age: 70
End: 2020-11-12
Attending: INTERNAL MEDICINE
Payer: MEDICARE

## 2020-11-12 DIAGNOSIS — E05.00 GRAVES DISEASE: ICD-10-CM

## 2020-11-12 DIAGNOSIS — E05.90 HYPERTHYROIDISM: ICD-10-CM

## 2020-11-12 LAB
T3FREE SERPL-MCNC: 3.86 PG/ML (ref 2–4.4)
T4 FREE SERPL-MCNC: 1.7 NG/DL (ref 0.93–1.7)
TSH SERPL DL<=0.005 MIU/L-ACNC: 0.01 UIU/ML (ref 0.38–5.33)

## 2020-11-12 PROCEDURE — 83520 IMMUNOASSAY QUANT NOS NONAB: CPT

## 2020-11-12 PROCEDURE — 84481 FREE ASSAY (FT-3): CPT

## 2020-11-12 PROCEDURE — 84443 ASSAY THYROID STIM HORMONE: CPT

## 2020-11-12 PROCEDURE — 84439 ASSAY OF FREE THYROXINE: CPT

## 2020-11-12 PROCEDURE — 36415 COLL VENOUS BLD VENIPUNCTURE: CPT

## 2020-11-15 LAB — TSH RECEP AB SER-ACNC: 3.43 IU/L

## 2020-11-17 ENCOUNTER — TELEMEDICINE (OUTPATIENT)
Dept: ENDOCRINOLOGY | Facility: MEDICAL CENTER | Age: 70
End: 2020-11-17
Attending: INTERNAL MEDICINE
Payer: MEDICARE

## 2020-11-17 DIAGNOSIS — E05.90 HYPERTHYROIDISM: ICD-10-CM

## 2020-11-17 DIAGNOSIS — E05.00 GRAVES DISEASE: ICD-10-CM

## 2020-11-17 PROCEDURE — 99213 OFFICE O/P EST LOW 20 MIN: CPT | Mod: 95,CR | Performed by: INTERNAL MEDICINE

## 2020-11-17 PROCEDURE — 999999 HB NO CHARGE: Performed by: INTERNAL MEDICINE

## 2020-11-17 NOTE — PROGRESS NOTES
Telemedicine Visit: Established Patient     This encounter was conducted via ZOOM but the visual failed.  Audio was used.  Verbal consent was obtained. Patient's identity was verified.    Subjective:   CC:   Jasiel Garrett is a 70 y.o. female presenting for evaluation and management of hyperthyroidism on methimazole.    HPI      Patient is clinically euthyroid.  We are using low-dose methimazole to control the condition and hoping for a remission.  In that regard her thyrotropin receptor antibody in January was relatively low at 2.4 but by July had gone up to 5.6 and is now back down to 3.4.  She still has a tendency for hyperthyroid lab data although she is asymptomatic.        She currently is taking 2.5 mg methimazole twice weekly.  In July her TSH was low so I asked her to increase that to 3 days a week but along the way she forgot so it is still twice a week.  TSH remains suppressed at 0.007.  Free T4 is upper normal at 1.7 and free T3 also upper normal at 3.8.        She will go back now to methimazole 2.5 mg 3 days a week and in 1 month repeat her thyroid levels and we will communicate by phone or MyChart    ROS   Denies any recent fevers or chills. No nausea or vomiting. No chest pains or shortness of breath.  No known contact with COVID-19 infection.    No Known Allergies    Current medicines (including changes today)  Current Outpatient Medications   Medication Sig Dispense Refill   • Turmeric Curcumin 500 MG Cap Take  by mouth.     • losartan (COZAAR) 50 MG Tab TAKE ONE TABLET BY MOUTH EVERY DAY 90 Tab 0   • methimazole (TAPAZOLE) 5 MG Tab Take 0.5 Tabs by mouth every 48 hours. (Patient taking differently: Take 2.5 mg by mouth every 72 hours.) 30 Tab 3   • Calcium Citrate (CITRACAL PO) Take 1,200 Units by mouth.     • DIGESTIVE ENZYMES PO Take  by mouth.     • ascorbic acid (ASCORBIC ACID) 500 MG Tab Take 500 mg by mouth every day.     • vitamin D (CHOLECALCIFEROL) 1000 UNIT Tab Take 1,000 Units by mouth  every day.       No current facility-administered medications for this visit.        Patient Active Problem List    Diagnosis Date Noted   • Vitamin D deficiency 10/27/2020   • Post-menopause 10/24/2019   • History of colonic polyps 10/22/2019   • Osteopenia of multiple sites 10/12/2019   • Prediabetes 07/31/2018   • Graves disease 04/05/2018   • Hyperthyroidism 07/14/2017   • Mixed hyperlipidemia 03/01/2017   • Essential hypertension 03/01/2017   • Class 2 obesity without serious comorbidity with body mass index (BMI) of 35.0 to 35.9 in adult 03/01/2017   • Primary osteoarthritis of both knees 03/01/2017       Family History   Problem Relation Age of Onset   • Arthritis Mother    • Heart Disease Father         arteriosclerosis   • Alcohol/Drug Sister    • Alcohol/Drug Brother        She  has a past medical history of Hyperlipidemia, Hypertension, and Hyperthyroidism. She also has no past medical history of Encounter for long-term (current) use of other medications.  She  has a past surgical history that includes hysteroscopy with video diagnostic.       Objective:   Vitals obtained by patient:  Blood pressure 140/90 and her losartan dose was increased.  Pulse running in the 80-90 range.  Temperature not measured but has not had a obvious fever.  No exposure to COVID-19    Physical Exam:  No visual could be established for this visit      Assessment and Plan:   The following treatment plan was discussed:     1. Hyperthyroidism              Clinically euthyroid but TSH remains suppressed.  Plan is to increase methimazole to 2.5 mg 3 days a week and update lab in 1 month.  Report via Jumio[facility] or phone.    2. Graves disease               No ophthalmopathy               Thyrotropin receptor antibody remains positive at 3.4 ( <1.7).        Follow-up: Thyroid blood levels next month and follow-up by MyChart or telephone.

## 2020-12-14 ENCOUNTER — HOSPITAL ENCOUNTER (OUTPATIENT)
Dept: LAB | Facility: MEDICAL CENTER | Age: 70
End: 2020-12-14
Attending: INTERNAL MEDICINE
Payer: MEDICARE

## 2020-12-14 DIAGNOSIS — E05.90 HYPERTHYROIDISM: ICD-10-CM

## 2020-12-14 LAB
T3FREE SERPL-MCNC: 3.69 PG/ML (ref 2–4.4)
T4 FREE SERPL-MCNC: 1.71 NG/DL (ref 0.93–1.7)
TSH SERPL DL<=0.005 MIU/L-ACNC: 0.01 UIU/ML (ref 0.38–5.33)

## 2020-12-14 PROCEDURE — 84481 FREE ASSAY (FT-3): CPT

## 2020-12-14 PROCEDURE — 36415 COLL VENOUS BLD VENIPUNCTURE: CPT

## 2020-12-14 PROCEDURE — 84439 ASSAY OF FREE THYROXINE: CPT

## 2020-12-14 PROCEDURE — 84443 ASSAY THYROID STIM HORMONE: CPT

## 2021-01-12 DIAGNOSIS — M85.89 OSTEOPENIA OF MULTIPLE SITES: ICD-10-CM

## 2021-01-12 DIAGNOSIS — Z78.0 MENOPAUSE: ICD-10-CM

## 2021-01-13 NOTE — PROGRESS NOTES
Telephone conversation with patient    I saw a previous thyroid level and because the TSH is down a bit I suggested an increase in methimazole but the patient is very comfortable with what she is doing now.  Her current dose is 2.5 mg 3 days a week..  Her free T4 is high normal at 1.7 and free T3 is normal at 3.6.  TSH remains low at 0.007.  Her last bone density had improved and showed only mild osteopenia in the left hip and normal lumbar spine.    We will repeat the bone density to make sure that it is holding.  She will also do another set of thyroid levels and we will further discuss.    Denny Ireland M.D.

## 2021-01-28 ENCOUNTER — APPOINTMENT (OUTPATIENT)
Dept: RADIOLOGY | Facility: MEDICAL CENTER | Age: 71
End: 2021-01-28
Attending: INTERNAL MEDICINE
Payer: MEDICARE

## 2021-02-01 ENCOUNTER — HOSPITAL ENCOUNTER (OUTPATIENT)
Dept: RADIOLOGY | Facility: MEDICAL CENTER | Age: 71
End: 2021-02-01
Attending: INTERNAL MEDICINE
Payer: MEDICARE

## 2021-02-01 DIAGNOSIS — Z78.0 MENOPAUSE: ICD-10-CM

## 2021-02-01 DIAGNOSIS — M85.89 OSTEOPENIA OF MULTIPLE SITES: ICD-10-CM

## 2021-02-01 PROCEDURE — 77080 DXA BONE DENSITY AXIAL: CPT

## 2021-02-09 ENCOUNTER — HOSPITAL ENCOUNTER (OUTPATIENT)
Dept: LAB | Facility: MEDICAL CENTER | Age: 71
End: 2021-02-09
Attending: INTERNAL MEDICINE
Payer: MEDICARE

## 2021-02-09 DIAGNOSIS — E05.90 HYPERTHYROIDISM: ICD-10-CM

## 2021-02-09 LAB
T3FREE SERPL-MCNC: 4.07 PG/ML (ref 2–4.4)
T4 FREE SERPL-MCNC: 1.68 NG/DL (ref 0.93–1.7)
TSH SERPL DL<=0.005 MIU/L-ACNC: <0.005 UIU/ML (ref 0.38–5.33)

## 2021-02-09 PROCEDURE — 84443 ASSAY THYROID STIM HORMONE: CPT

## 2021-02-09 PROCEDURE — 36415 COLL VENOUS BLD VENIPUNCTURE: CPT

## 2021-02-09 PROCEDURE — 84481 FREE ASSAY (FT-3): CPT

## 2021-02-09 PROCEDURE — 84439 ASSAY OF FREE THYROXINE: CPT

## 2021-02-15 ENCOUNTER — TELEPHONE (OUTPATIENT)
Dept: ENDOCRINOLOGY | Facility: MEDICAL CENTER | Age: 71
End: 2021-02-15

## 2021-02-15 DIAGNOSIS — E05.90 HYPERTHYROIDISM: ICD-10-CM

## 2021-02-15 DIAGNOSIS — E05.00 GRAVES DISEASE: ICD-10-CM

## 2021-02-16 NOTE — TELEPHONE ENCOUNTER
Telephone conversation with patient.    TSH is now completely suppressed.  She still is asymptomatic with an occasional sensation of vibration but no tremors and no tachycardia.  Blood pressure is normal as well.    Bone density is stable    Plan is to increase methimazole to 2.5 mg 4 days a week.    Return appointment in May and update lab at that time.    Denny Ireland M.D.

## 2021-03-03 DIAGNOSIS — Z23 NEED FOR VACCINATION: ICD-10-CM

## 2021-03-11 ENCOUNTER — IMMUNIZATION (OUTPATIENT)
Dept: FAMILY PLANNING/WOMEN'S HEALTH CLINIC | Facility: IMMUNIZATION CENTER | Age: 71
End: 2021-03-11
Attending: INTERNAL MEDICINE
Payer: MEDICARE

## 2021-03-11 DIAGNOSIS — Z23 NEED FOR VACCINATION: ICD-10-CM

## 2021-03-11 DIAGNOSIS — Z23 ENCOUNTER FOR VACCINATION: Primary | ICD-10-CM

## 2021-03-11 PROCEDURE — 91300 PFIZER SARS-COV-2 VACCINE: CPT

## 2021-03-11 PROCEDURE — 0001A PFIZER SARS-COV-2 VACCINE: CPT

## 2021-04-02 ENCOUNTER — IMMUNIZATION (OUTPATIENT)
Dept: FAMILY PLANNING/WOMEN'S HEALTH CLINIC | Facility: IMMUNIZATION CENTER | Age: 71
End: 2021-04-02
Attending: INTERNAL MEDICINE
Payer: MEDICARE

## 2021-04-02 DIAGNOSIS — Z23 ENCOUNTER FOR VACCINATION: Primary | ICD-10-CM

## 2021-04-02 PROCEDURE — 0002A PFIZER SARS-COV-2 VACCINE: CPT

## 2021-04-02 PROCEDURE — 91300 PFIZER SARS-COV-2 VACCINE: CPT

## 2021-05-04 ENCOUNTER — HOSPITAL ENCOUNTER (OUTPATIENT)
Dept: LAB | Facility: MEDICAL CENTER | Age: 71
End: 2021-05-04
Attending: INTERNAL MEDICINE
Payer: MEDICARE

## 2021-05-04 DIAGNOSIS — E05.90 HYPERTHYROIDISM: ICD-10-CM

## 2021-05-04 DIAGNOSIS — E05.00 GRAVES DISEASE: ICD-10-CM

## 2021-05-04 LAB
T3FREE SERPL-MCNC: 3.07 PG/ML (ref 2–4.4)
T4 FREE SERPL-MCNC: 1.42 NG/DL (ref 0.93–1.7)
TSH SERPL DL<=0.005 MIU/L-ACNC: 0.02 UIU/ML (ref 0.38–5.33)

## 2021-05-04 PROCEDURE — 84481 FREE ASSAY (FT-3): CPT

## 2021-05-04 PROCEDURE — 84439 ASSAY OF FREE THYROXINE: CPT

## 2021-05-04 PROCEDURE — 84443 ASSAY THYROID STIM HORMONE: CPT

## 2021-05-04 PROCEDURE — 83520 IMMUNOASSAY QUANT NOS NONAB: CPT

## 2021-05-04 PROCEDURE — 36415 COLL VENOUS BLD VENIPUNCTURE: CPT

## 2021-05-06 LAB — TSH RECEP AB SER-ACNC: 8.5 IU/L

## 2021-05-11 ENCOUNTER — OFFICE VISIT (OUTPATIENT)
Dept: ENDOCRINOLOGY | Facility: MEDICAL CENTER | Age: 71
End: 2021-05-11
Attending: INTERNAL MEDICINE
Payer: MEDICARE

## 2021-05-11 VITALS
RESPIRATION RATE: 16 BRPM | HEIGHT: 65 IN | SYSTOLIC BLOOD PRESSURE: 142 MMHG | WEIGHT: 211.8 LBS | BODY MASS INDEX: 35.29 KG/M2 | OXYGEN SATURATION: 93 % | HEART RATE: 98 BPM | DIASTOLIC BLOOD PRESSURE: 86 MMHG

## 2021-05-11 DIAGNOSIS — E05.00 GRAVES DISEASE: ICD-10-CM

## 2021-05-11 DIAGNOSIS — M85.89 OSTEOPENIA OF MULTIPLE SITES: ICD-10-CM

## 2021-05-11 DIAGNOSIS — E05.90 HYPERTHYROIDISM: ICD-10-CM

## 2021-05-11 PROCEDURE — 99213 OFFICE O/P EST LOW 20 MIN: CPT | Performed by: INTERNAL MEDICINE

## 2021-05-11 PROCEDURE — 99211 OFF/OP EST MAY X REQ PHY/QHP: CPT | Performed by: INTERNAL MEDICINE

## 2021-05-11 ASSESSMENT — FIBROSIS 4 INDEX: FIB4 SCORE: 1.369565217391304348

## 2021-05-11 ASSESSMENT — PATIENT HEALTH QUESTIONNAIRE - PHQ9: CLINICAL INTERPRETATION OF PHQ2 SCORE: 0

## 2021-05-11 NOTE — PROGRESS NOTES
Chief Complaint   Patient presents with   • Thyrotoxicosis     Graves' disease   • Osteopenia        HPI:    Hyperthyroidism        Clinically euthyroid taking a very low dose of methimazole 2.5 mg 4 days a week.  Previously her TSH was completely suppressed and now it is measurable at 0.02.  Free T4 was upper normal at 1.6 and now it is mid normal at 1.4 (0.9-1.7).  Free T3 also mid range 3.0 (2.0-4.4).       As the T4 adjusts the TSH may not come up at the same time.  She is euthyroid with absolutely no symptoms of excessive thyroid replacement.  She really does not want to lower her thyroid levels anymore.        Her bone density is stable with very mild osteopenia at the hip which was stable compared to previous lumbar spine T -0.6 and had actually improved a little bit compared to previous.         We will continue to monitor thyroid levels.    ROS:  All other systems reported as negative or unchanged since last exam      Allergies: No Known Allergies    Current medicines including changes today:  Current Outpatient Medications   Medication Sig Dispense Refill   • losartan (COZAAR) 100 MG Tab Take 1 Tab by mouth every day. 90 Tab 1   • Turmeric Curcumin 500 MG Cap Take  by mouth.     • methimazole (TAPAZOLE) 5 MG Tab Take 0.5 Tabs by mouth every 48 hours. (Patient taking differently: Take 2.5 mg by mouth every 72 hours.) 30 Tab 3   • Calcium Citrate (CITRACAL PO) Take 1,200 Units by mouth.     • DIGESTIVE ENZYMES PO Take  by mouth.     • ascorbic acid (ASCORBIC ACID) 500 MG Tab Take 500 mg by mouth every day.     • vitamin D (CHOLECALCIFEROL) 1000 UNIT Tab Take 1,000 Units by mouth every day.       No current facility-administered medications for this visit.        Past Medical History:   Diagnosis Date   • Hyperlipidemia    • Hypertension    • Hyperthyroidism     Graves' disease / thyrotropin receptor antibody = 6.2       PHYSICAL EXAM:    /86 (BP Location: Left arm, Patient Position: Sitting, BP Cuff  "Size: Adult)   Pulse 98   Resp 16   Ht 1.651 m (5' 5\")   Wt 96.1 kg (211 lb 12.8 oz)   SpO2 93%   BMI 35.25 kg/m²   Blood pressure at home this morning 123/83 with pulse 89.  On  On my examination pulse is 80 and regular    Gen. overweight but otherwise appears healthy     Skin   appropriate for sex and age    HEENT    no sign of Graves' ophthalmopathy    Neck   thyroid gland is difficult to palpate.  It is not enlarged.    Heart  regular    Extremities  no edema    Neuro  gait and station normal, no tremor    Psych  appropriate, calm, pleasant    ASSESSMENT AND RECOMMENDATIONS    1. Hyperthyroidism, Graves' disease              Clinically euthyroid taking very small dose of methimazole 2.5 mg 4 days a week.  We are waiting for remission and with a small gland that might occur.  She very much does not want I-131 ablation or thyroidectomy.    2. Graves disease             Thyrotropin receptor antibody is still positive at 8.5 ( <1.7)    3. Osteopenia of multiple sites            Stable       DISPOSITION: Continue to monitor thyroid blood levels.                             Next test in 1 month.                             Return to clinic in 6 months.      Denny Ireland M.D.    Copies to: Maryse James M.D. [phone]  "

## 2021-06-01 ENCOUNTER — HOSPITAL ENCOUNTER (OUTPATIENT)
Dept: LAB | Facility: MEDICAL CENTER | Age: 71
End: 2021-06-01
Attending: INTERNAL MEDICINE
Payer: MEDICARE

## 2021-06-01 DIAGNOSIS — E05.90 HYPERTHYROIDISM: ICD-10-CM

## 2021-06-01 LAB
T3FREE SERPL-MCNC: 2.95 PG/ML (ref 2–4.4)
T4 FREE SERPL-MCNC: 1.41 NG/DL (ref 0.93–1.7)
TSH SERPL DL<=0.005 MIU/L-ACNC: 0.02 UIU/ML (ref 0.38–5.33)

## 2021-06-01 PROCEDURE — 84481 FREE ASSAY (FT-3): CPT

## 2021-06-01 PROCEDURE — 84439 ASSAY OF FREE THYROXINE: CPT

## 2021-06-01 PROCEDURE — 84443 ASSAY THYROID STIM HORMONE: CPT

## 2021-06-01 PROCEDURE — 36415 COLL VENOUS BLD VENIPUNCTURE: CPT

## 2021-08-03 ENCOUNTER — HOSPITAL ENCOUNTER (OUTPATIENT)
Dept: LAB | Facility: MEDICAL CENTER | Age: 71
End: 2021-08-03
Attending: INTERNAL MEDICINE
Payer: MEDICARE

## 2021-08-03 DIAGNOSIS — E05.90 HYPERTHYROIDISM: ICD-10-CM

## 2021-08-03 LAB
T3FREE SERPL-MCNC: 2.77 PG/ML (ref 2–4.4)
T4 FREE SERPL-MCNC: 1.37 NG/DL (ref 0.93–1.7)
TSH SERPL DL<=0.005 MIU/L-ACNC: 0.06 UIU/ML (ref 0.38–5.33)

## 2021-08-03 PROCEDURE — 84443 ASSAY THYROID STIM HORMONE: CPT

## 2021-08-03 PROCEDURE — 36415 COLL VENOUS BLD VENIPUNCTURE: CPT

## 2021-08-03 PROCEDURE — 84481 FREE ASSAY (FT-3): CPT

## 2021-08-03 PROCEDURE — 84439 ASSAY OF FREE THYROXINE: CPT

## 2021-08-09 DIAGNOSIS — I10 ESSENTIAL HYPERTENSION: ICD-10-CM

## 2021-08-09 RX ORDER — LOSARTAN POTASSIUM 100 MG/1
TABLET ORAL
Qty: 90 TABLET | Refills: 0 | Status: SHIPPED | OUTPATIENT
Start: 2021-08-09 | End: 2021-11-08

## 2021-08-31 DIAGNOSIS — E05.90 HYPERTHYROIDISM: ICD-10-CM

## 2021-08-31 RX ORDER — METHIMAZOLE 5 MG/1
2.5 TABLET ORAL
Qty: 30 TABLET | Refills: 3 | Status: SHIPPED | OUTPATIENT
Start: 2021-08-31 | End: 2022-05-17 | Stop reason: SDUPTHER

## 2021-09-09 DIAGNOSIS — E05.90 HYPERTHYROIDISM: ICD-10-CM

## 2021-10-04 DIAGNOSIS — E78.2 MIXED HYPERLIPIDEMIA: ICD-10-CM

## 2021-10-04 DIAGNOSIS — Z13.0 SCREENING FOR DEFICIENCY ANEMIA: ICD-10-CM

## 2021-10-04 DIAGNOSIS — R73.03 PREDIABETES: ICD-10-CM

## 2021-10-04 DIAGNOSIS — I10 ESSENTIAL HYPERTENSION: ICD-10-CM

## 2021-10-28 ENCOUNTER — OFFICE VISIT (OUTPATIENT)
Dept: MEDICAL GROUP | Facility: LAB | Age: 71
End: 2021-10-28
Payer: MEDICARE

## 2021-10-28 VITALS
RESPIRATION RATE: 16 BRPM | OXYGEN SATURATION: 96 % | SYSTOLIC BLOOD PRESSURE: 128 MMHG | DIASTOLIC BLOOD PRESSURE: 90 MMHG | BODY MASS INDEX: 34.87 KG/M2 | TEMPERATURE: 98.8 F | HEIGHT: 66 IN | HEART RATE: 82 BPM | WEIGHT: 217 LBS

## 2021-10-28 DIAGNOSIS — E55.9 VITAMIN D DEFICIENCY: ICD-10-CM

## 2021-10-28 DIAGNOSIS — Z12.31 ENCOUNTER FOR SCREENING MAMMOGRAM FOR BREAST CANCER: ICD-10-CM

## 2021-10-28 DIAGNOSIS — E05.00 GRAVES DISEASE: ICD-10-CM

## 2021-10-28 DIAGNOSIS — E66.9 CLASS 2 OBESITY WITHOUT SERIOUS COMORBIDITY WITH BODY MASS INDEX (BMI) OF 35.0 TO 35.9 IN ADULT, UNSPECIFIED OBESITY TYPE: ICD-10-CM

## 2021-10-28 DIAGNOSIS — Z00.00 MEDICARE ANNUAL WELLNESS VISIT, SUBSEQUENT: Primary | ICD-10-CM

## 2021-10-28 DIAGNOSIS — R73.03 PREDIABETES: ICD-10-CM

## 2021-10-28 DIAGNOSIS — I10 ESSENTIAL HYPERTENSION: ICD-10-CM

## 2021-10-28 DIAGNOSIS — E78.2 MIXED HYPERLIPIDEMIA: ICD-10-CM

## 2021-10-28 PROCEDURE — G0439 PPPS, SUBSEQ VISIT: HCPCS | Performed by: FAMILY MEDICINE

## 2021-10-28 ASSESSMENT — FIBROSIS 4 INDEX: FIB4 SCORE: 1.369565217391304348

## 2021-10-28 ASSESSMENT — ACTIVITIES OF DAILY LIVING (ADL): BATHING_REQUIRES_ASSISTANCE: 0

## 2021-10-28 ASSESSMENT — PATIENT HEALTH QUESTIONNAIRE - PHQ9: CLINICAL INTERPRETATION OF PHQ2 SCORE: 0

## 2021-10-28 ASSESSMENT — ENCOUNTER SYMPTOMS: GENERAL WELL-BEING: GOOD

## 2021-10-28 NOTE — PROGRESS NOTES
Chief Complaint   Patient presents with   • Annual Wellness Visit         HPI:  Jasiel is a 71 y.o. here for Medicare Annual Wellness Visit    Essential hypertension  Stable. Currently taking losartan 100 mg daily as directed.   She is not taking baby aspirin daily.   She is monitoring BP at home.   Denies symptoms low BP: light-headed, tunnel-vision, unusual fatigue.   Denies symptoms high BP:pounding headache, visual changes, palpitations, flushed face.   Denies medicine side effects: unusual fatigue, slow heartbeat, foot/leg swelling, cough.          Patient Active Problem List    Diagnosis Date Noted   • Vitamin D deficiency 10/27/2020   • Post-menopause 10/24/2019   • History of colonic polyps 10/22/2019   • Osteopenia of multiple sites 10/12/2019   • Prediabetes 07/31/2018   • Graves disease 04/05/2018   • Hyperthyroidism 07/14/2017   • Mixed hyperlipidemia 03/01/2017   • Essential hypertension 03/01/2017   • Class 2 obesity without serious comorbidity with body mass index (BMI) of 35.0 to 35.9 in adult 03/01/2017   • Primary osteoarthritis of both knees 03/01/2017       Current Outpatient Medications   Medication Sig Dispense Refill   • methimazole (TAPAZOLE) 5 MG Tab Take 0.5 Tablets by mouth every 48 hours. 30 Tablet 3   • losartan (COZAAR) 100 MG Tab TAKE ONE TABLET BY MOUTH EVERY DAY 90 tablet 0   • Turmeric Curcumin 500 MG Cap Take  by mouth.     • Calcium Citrate (CITRACAL PO) Take 1,200 Units by mouth.     • DIGESTIVE ENZYMES PO Take  by mouth.     • ascorbic acid (ASCORBIC ACID) 500 MG Tab Take 500 mg by mouth every day.     • vitamin D (CHOLECALCIFEROL) 1000 UNIT Tab Take 1,000 Units by mouth every day.       No current facility-administered medications for this visit.        Patient is taking medications as noted in medication list.  Current supplements as per medication list.     Allergies: Patient has no known allergies.    Current social contact/activities:  football games , boating and dinner  with friends and enjoy each other     Is patient current with immunizations? No, due for TDAP. Patient is interested in receiving NONE today.    She  reports that she quit smoking about 24 years ago. She has a 10.00 pack-year smoking history. She has never used smokeless tobacco. She reports current alcohol use of about 1.2 oz of alcohol per week. She reports that she does not use drugs.  Counseling given: Not Answered        DPA/Advanced directive: Patient has Advanced Directive on file.     ROS:    Gait: Uses no assistive device   Ostomy: No   Other tubes: No   Amputations: No   Chronic oxygen use No   Last eye exam 2020   Wears hearing aids: No   : Reports urinary leakage during the last 6 months that has somewhat interfered with their daily activities or sleep.      Screening:        Depression Screening    Little interest or pleasure in doing things?  0 - not at all  Feeling down, depressed, or hopeless? 0 - not at all  Patient Health Questionnaire Score: 0    If depressive symptoms identified deferred to follow up visit unless specifically addressed in assessment and plan.    Interpretation of PHQ-9 Total Score   Score Severity   1-4 No Depression   5-9 Mild Depression   10-14 Moderate Depression   15-19 Moderately Severe Depression   20-27 Severe Depression    Screening for Cognitive Impairment    Three Minute Recall (captain, garden, picture)  3/3    Thor clock face with all 12 numbers and set the hands to show 5 past 8.  Yes    If cognitive concerns identified, deferred for follow up unless specifically addressed in assessment and plan.    Fall Risk Assessment    Has the patient had two or more falls in the last year or any fall with injury in the last year?  No  If fall risk identified, deferred for follow up unless specifically addressed in assessment and plan.    Safety Assessment    Throw rugs on floor.  No  Handrails on all stairs.  Yes  Good lighting in all hallways.  Yes  Difficulty hearing.   No  Patient counseled about all safety risks that were identified.    Functional Assessment ADLs    Are there any barriers preventing you from cooking for yourself or meeting nutritional needs?  No.    Are there any barriers preventing you from driving safely or obtaining transportation?  No.    Are there any barriers preventing you from using a telephone or calling for help?  No.    Are there any barriers preventing you from shopping?  No.    Are there any barriers preventing you from taking care of your own finances?  No.    Are there any barriers preventing you from managing your medications?  No.    Are there any barriers preventing you from showering, bathing or dressing yourself?  No.    Are you currently engaging in any exercise or physical activity?  Yes.     What is your perception of your health?  Good.    Health Maintenance Summary                IMM DTaP/Tdap/Td Vaccine Overdue 4/10/1970     MAMMOGRAM Next Due 2021      Done 2019 MA-SCREENING MAMMO BILAT W/TOMOSYNTHESIS W/CAD     Patient has more history with this topic...    Annual Wellness Visit Next Due 10/29/2022      Done 10/28/2021 Visit Dx: Medicare annual wellness visit, subsequent     Patient has more history with this topic...    COLORECTAL CANCER SCREENING Next Due 11/15/2022     BONE DENSITY Next Due 2026      Done 2021 DS-BONE DENSITY STUDY (DEXA)     Patient has more history with this topic...          Patient Care Team:  Maryse James M.D. as PCP - General (Family Medicine)  Denny Ireland M.D. (Endocrinology)    Social History     Tobacco Use   • Smoking status: Former Smoker     Packs/day: 0.50     Years: 20.00     Pack years: 10.00     Quit date: 3/1/1997     Years since quittin.6   • Smokeless tobacco: Never Used   Vaping Use   • Vaping Use: Never used   Substance Use Topics   • Alcohol use: Yes     Alcohol/week: 1.2 oz     Types: 2 Glasses of wine per week   • Drug use: No     Family History   Problem  "Relation Age of Onset   • Arthritis Mother    • Heart Disease Father         arteriosclerosis   • Alcohol/Drug Sister    • Alcohol/Drug Brother      She  has a past medical history of Hyperlipidemia, Hypertension, and Hyperthyroidism. She also has no past medical history of Encounter for long-term (current) use of other medications.   Past Surgical History:   Procedure Laterality Date   • HYSTEROSCOPY WITH VIDEO DIAGNOSTIC             Exam:     /90 (BP Location: Right arm, Patient Position: Sitting, BP Cuff Size: Adult)   Pulse 82   Temp 37.1 °C (98.8 °F) (Temporal)   Resp 16   Ht 1.676 m (5' 6\")   Wt 98.4 kg (217 lb)   SpO2 96%  Body mass index is 35.02 kg/m².    Hearing good.    Alert, oriented in no acute distress.  Eye contact is good, speech goal directed, affect calm  Constitutional: Alert, no distress.  Skin: Warm, dry, good turgor, no rashes in visible areas.  Eye: Equal, round and reactive, conjunctiva clear, lids normal.  ENMT: Pinna normal.  TM's clear bilaterally  Neck: Trachea midline, no masses, no thyromegaly. No cervical or supraclavicular lymphadenopathy  Respiratory: Unlabored respiratory effort, lungs clear to auscultation, no wheezes, no ronchi.  Cardiovascular: Normal S1, S2, RRR, no murmur, no edema.  Abdomen: Soft, non-tender, no masses, no hepatosplenomegaly.  Psych: Alert and oriented x3, normal affect and mood.        Assessment and Plan. The following treatment and monitoring plan is recommended:    1. Medicare annual wellness visit, subsequent  Routine anticipatory guidance.  No special services needed at this time.  Health counseling done    2. Vitamin D deficiency  Continue vitamin D supplementation    3. Prediabetes  Dietary counseling done.  Encourage weight loss.  Recheck in 6 months    4. Mixed hyperlipidemia  Dietary counseling done  The 10-year ASCVD risk score (Durham JUAN Jr., et al., 2013) is: 12.9%  Patient would like to avoid statins.  Discussed Mediterranean eating " plan and risk reduction.    5. Graves disease  Patient is being followed by endocrinology.  She is currently on Tapazole 2.5 mg 4 days a week.  She has not seen an ophthalmologist for an eye exam so we will place a referral given her underlying hyperthyroidism and concern for eye disease.  - REFERRAL TO OPHTHALMOLOGY    6. Essential hypertension  Stable. Currently taking losartan 100 mg daily as directed.   She is not taking baby aspirin daily.   She is monitoring BP at home.   Denies symptoms low BP: light-headed, tunnel-vision, unusual fatigue.   Denies symptoms high BP:pounding headache, visual changes, palpitations, flushed face.   Denies medicine side effects: unusual fatigue, slow heartbeat, foot/leg swelling, cough.      7. Class 2 obesity without serious comorbidity with body mass index (BMI) of 35.0 to 35.9 in adult, unspecified obesity type  Encourage weight loss for overall health improvement              Services suggested: No services needed at this time  Health Care Screening recommendations as per orders if indicated.  Referrals offered: PT/OT/Nutrition counseling/Behavioral Health/Smoking cessation as per orders if indicated.    Discussion today about general wellness and lifestyle habits:    · Prevent falls and reduce trip hazards; Cautioned about securing or removing rugs.  · Have a working fire alarm and carbon monoxide detector;   · Engage in regular physical activity and social activities       Follow-up: Return in about 1 year (around 10/28/2022).

## 2021-10-28 NOTE — ASSESSMENT & PLAN NOTE
Stable. Currently taking losartan 100 mg daily as directed.   She is not taking baby aspirin daily.   She is monitoring BP at home.   Denies symptoms low BP: light-headed, tunnel-vision, unusual fatigue.   Denies symptoms high BP:pounding headache, visual changes, palpitations, flushed face.   Denies medicine side effects: unusual fatigue, slow heartbeat, foot/leg swelling, cough.

## 2021-10-28 NOTE — PATIENT INSTRUCTIONS
Preventive Care 65 Years and Older, Female  Preventive care refers to lifestyle choices and visits with your health care provider that can promote health and wellness. This includes:  · A yearly physical exam. This is also called an annual well check.  · Regular dental and eye exams.  · Immunizations.  · Screening for certain conditions.  · Healthy lifestyle choices, such as diet and exercise.  What can I expect for my preventive care visit?  Physical exam  Your health care provider will check:  · Height and weight. These may be used to calculate body mass index (BMI), which is a measurement that tells if you are at a healthy weight.  · Heart rate and blood pressure.  · Your skin for abnormal spots.  Counseling  Your health care provider may ask you questions about:  · Alcohol, tobacco, and drug use.  · Emotional well-being.  · Home and relationship well-being.  · Sexual activity.  · Eating habits.  · History of falls.  · Memory and ability to understand (cognition).  · Work and work environment.  · Pregnancy and menstrual history.  What immunizations do I need?    Influenza (flu) vaccine  · This is recommended every year.  Tetanus, diphtheria, and pertussis (Tdap) vaccine  · You may need a Td booster every 10 years.  Varicella (chickenpox) vaccine  · You may need this vaccine if you have not already been vaccinated.  Zoster (shingles) vaccine  · You may need this after age 60.  Pneumococcal conjugate (PCV13) vaccine  · One dose is recommended after age 65.  Pneumococcal polysaccharide (PPSV23) vaccine  · One dose is recommended after age 65.  Measles, mumps, and rubella (MMR) vaccine  · You may need at least one dose of MMR if you were born in 1957 or later. You may also need a second dose.  Meningococcal conjugate (MenACWY) vaccine  · You may need this if you have certain conditions.  Hepatitis A vaccine  · You may need this if you have certain conditions or if you travel or work in places where you may be exposed  to hepatitis A.  Hepatitis B vaccine  · You may need this if you have certain conditions or if you travel or work in places where you may be exposed to hepatitis B.  Haemophilus influenzae type b (Hib) vaccine  · You may need this if you have certain conditions.  You may receive vaccines as individual doses or as more than one vaccine together in one shot (combination vaccines). Talk with your health care provider about the risks and benefits of combination vaccines.  What tests do I need?  Blood tests  · Lipid and cholesterol levels. These may be checked every 5 years, or more frequently depending on your overall health.  · Hepatitis C test.  · Hepatitis B test.  Screening  · Lung cancer screening. You may have this screening every year starting at age 55 if you have a 30-pack-year history of smoking and currently smoke or have quit within the past 15 years.  · Colorectal cancer screening. All adults should have this screening starting at age 50 and continuing until age 75. Your health care provider may recommend screening at age 45 if you are at increased risk. You will have tests every 1-10 years, depending on your results and the type of screening test.  · Diabetes screening. This is done by checking your blood sugar (glucose) after you have not eaten for a while (fasting). You may have this done every 1-3 years.  · Mammogram. This may be done every 1-2 years. Talk with your health care provider about how often you should have regular mammograms.  · BRCA-related cancer screening. This may be done if you have a family history of breast, ovarian, tubal, or peritoneal cancers.  Other tests  · Sexually transmitted disease (STD) testing.  · Bone density scan. This is done to screen for osteoporosis. You may have this done starting at age 65.  Follow these instructions at home:  Eating and drinking  · Eat a diet that includes fresh fruits and vegetables, whole grains, lean protein, and low-fat dairy products. Limit  your intake of foods with high amounts of sugar, saturated fats, and salt.  · Take vitamin and mineral supplements as recommended by your health care provider.  · Do not drink alcohol if your health care provider tells you not to drink.  · If you drink alcohol:  ? Limit how much you have to 0-1 drink a day.  ? Be aware of how much alcohol is in your drink. In the [place] one drink equals one 12 oz bottle of beer (355 mL), one 5 oz glass of wine (148 mL), or one 1½ oz glass of hard liquor (44 mL).  Lifestyle  · Take daily care of your teeth and gums.  · Stay active. Exercise for at least 30 minutes on 5 or more days each week.  · Do not use any products that contain nicotine or tobacco, such as cigarettes, e-cigarettes, and chewing tobacco. If you need help quitting, ask your health care provider.  · If you are sexually active, practice safe sex. Use a condom or other form of protection in order to prevent STIs (sexually transmitted infections).  · Talk with your health care provider about taking a low-dose aspirin or statin.  What's next?  · Go to your health care provider once a year for a well check visit.  · Ask your health care provider how often you should have your eyes and teeth checked.  · Stay up to date on all vaccines.  This information is not intended to replace advice given to you by your health care provider. Make sure you discuss any questions you have with your health care provider.  Document Released: 01/13/2017 Document Revised: 12/12/2019 Document Reviewed: 12/12/2019  [employer] Patient Education © 2020 [employer]

## 2021-11-07 DIAGNOSIS — I10 ESSENTIAL HYPERTENSION: ICD-10-CM

## 2021-11-08 ENCOUNTER — HOSPITAL ENCOUNTER (OUTPATIENT)
Dept: LAB | Facility: MEDICAL CENTER | Age: 71
End: 2021-11-08
Attending: INTERNAL MEDICINE
Payer: MEDICARE

## 2021-11-08 ENCOUNTER — HOSPITAL ENCOUNTER (OUTPATIENT)
Dept: LAB | Facility: MEDICAL CENTER | Age: 71
End: 2021-11-08
Attending: FAMILY MEDICINE
Payer: MEDICARE

## 2021-11-08 DIAGNOSIS — R73.03 PREDIABETES: ICD-10-CM

## 2021-11-08 DIAGNOSIS — I10 ESSENTIAL HYPERTENSION: ICD-10-CM

## 2021-11-08 DIAGNOSIS — Z13.0 SCREENING FOR DEFICIENCY ANEMIA: ICD-10-CM

## 2021-11-08 DIAGNOSIS — E78.2 MIXED HYPERLIPIDEMIA: ICD-10-CM

## 2021-11-08 DIAGNOSIS — E05.90 HYPERTHYROIDISM: ICD-10-CM

## 2021-11-08 LAB
ALBUMIN SERPL BCP-MCNC: 4.2 G/DL (ref 3.2–4.9)
ALBUMIN/GLOB SERPL: 1.3 G/DL
ALP SERPL-CCNC: 74 U/L (ref 30–99)
ALT SERPL-CCNC: 17 U/L (ref 2–50)
ANION GAP SERPL CALC-SCNC: 10 MMOL/L (ref 7–16)
AST SERPL-CCNC: 21 U/L (ref 12–45)
BASOPHILS # BLD AUTO: 1 % (ref 0–1.8)
BASOPHILS # BLD: 0.05 K/UL (ref 0–0.12)
BILIRUB SERPL-MCNC: 0.7 MG/DL (ref 0.1–1.5)
BUN SERPL-MCNC: 11 MG/DL (ref 8–22)
CALCIUM SERPL-MCNC: 9.6 MG/DL (ref 8.5–10.5)
CHLORIDE SERPL-SCNC: 106 MMOL/L (ref 96–112)
CHOLEST SERPL-MCNC: 206 MG/DL (ref 100–199)
CO2 SERPL-SCNC: 26 MMOL/L (ref 20–33)
CREAT SERPL-MCNC: 0.81 MG/DL (ref 0.5–1.4)
EOSINOPHIL # BLD AUTO: 0.45 K/UL (ref 0–0.51)
EOSINOPHIL NFR BLD: 8.6 % (ref 0–6.9)
ERYTHROCYTE [DISTWIDTH] IN BLOOD BY AUTOMATED COUNT: 46.5 FL (ref 35.9–50)
EST. AVERAGE GLUCOSE BLD GHB EST-MCNC: 105 MG/DL
FASTING STATUS PATIENT QL REPORTED: NORMAL
GLOBULIN SER CALC-MCNC: 3.3 G/DL (ref 1.9–3.5)
GLUCOSE SERPL-MCNC: 94 MG/DL (ref 65–99)
HBA1C MFR BLD: 5.3 % (ref 4–5.6)
HCT VFR BLD AUTO: 49.8 % (ref 37–47)
HDLC SERPL-MCNC: 51 MG/DL
HGB BLD-MCNC: 16.2 G/DL (ref 12–16)
IMM GRANULOCYTES # BLD AUTO: 0.01 K/UL (ref 0–0.11)
IMM GRANULOCYTES NFR BLD AUTO: 0.2 % (ref 0–0.9)
LDLC SERPL CALC-MCNC: 133 MG/DL
LYMPHOCYTES # BLD AUTO: 1.89 K/UL (ref 1–4.8)
LYMPHOCYTES NFR BLD: 35.9 % (ref 22–41)
MCH RBC QN AUTO: 31.8 PG (ref 27–33)
MCHC RBC AUTO-ENTMCNC: 32.5 G/DL (ref 33.6–35)
MCV RBC AUTO: 97.8 FL (ref 81.4–97.8)
MONOCYTES # BLD AUTO: 0.51 K/UL (ref 0–0.85)
MONOCYTES NFR BLD AUTO: 9.7 % (ref 0–13.4)
NEUTROPHILS # BLD AUTO: 2.35 K/UL (ref 2–7.15)
NEUTROPHILS NFR BLD: 44.6 % (ref 44–72)
NRBC # BLD AUTO: 0 K/UL
NRBC BLD-RTO: 0 /100 WBC
PLATELET # BLD AUTO: 209 K/UL (ref 164–446)
PMV BLD AUTO: 10.9 FL (ref 9–12.9)
POTASSIUM SERPL-SCNC: 4.4 MMOL/L (ref 3.6–5.5)
PROT SERPL-MCNC: 7.5 G/DL (ref 6–8.2)
RBC # BLD AUTO: 5.09 M/UL (ref 4.2–5.4)
SODIUM SERPL-SCNC: 142 MMOL/L (ref 135–145)
T3FREE SERPL-MCNC: 3.18 PG/ML (ref 2–4.4)
T4 FREE SERPL-MCNC: 1.35 NG/DL (ref 0.93–1.7)
TRIGL SERPL-MCNC: 112 MG/DL (ref 0–149)
TSH SERPL DL<=0.005 MIU/L-ACNC: 0.26 UIU/ML (ref 0.38–5.33)
WBC # BLD AUTO: 5.3 K/UL (ref 4.8–10.8)

## 2021-11-08 PROCEDURE — 36415 COLL VENOUS BLD VENIPUNCTURE: CPT

## 2021-11-08 PROCEDURE — 84443 ASSAY THYROID STIM HORMONE: CPT

## 2021-11-08 PROCEDURE — 85025 COMPLETE CBC W/AUTO DIFF WBC: CPT

## 2021-11-08 PROCEDURE — 80053 COMPREHEN METABOLIC PANEL: CPT

## 2021-11-08 PROCEDURE — 84439 ASSAY OF FREE THYROXINE: CPT

## 2021-11-08 PROCEDURE — 80061 LIPID PANEL: CPT

## 2021-11-08 PROCEDURE — 83036 HEMOGLOBIN GLYCOSYLATED A1C: CPT | Mod: GA

## 2021-11-08 PROCEDURE — 84481 FREE ASSAY (FT-3): CPT

## 2021-11-09 RX ORDER — LOSARTAN POTASSIUM 100 MG/1
TABLET ORAL
Qty: 90 TABLET | Refills: 3 | Status: SHIPPED | OUTPATIENT
Start: 2021-11-09 | End: 2022-10-14

## 2021-11-17 ENCOUNTER — OFFICE VISIT (OUTPATIENT)
Dept: ENDOCRINOLOGY | Facility: MEDICAL CENTER | Age: 71
End: 2021-11-17
Attending: INTERNAL MEDICINE
Payer: MEDICARE

## 2021-11-17 VITALS
RESPIRATION RATE: 16 BRPM | SYSTOLIC BLOOD PRESSURE: 124 MMHG | HEART RATE: 99 BPM | BODY MASS INDEX: 34.7 KG/M2 | DIASTOLIC BLOOD PRESSURE: 72 MMHG | HEIGHT: 66 IN | OXYGEN SATURATION: 92 % | WEIGHT: 215.9 LBS

## 2021-11-17 DIAGNOSIS — E05.90 HYPERTHYROIDISM: ICD-10-CM

## 2021-11-17 DIAGNOSIS — E05.00 GRAVES DISEASE: ICD-10-CM

## 2021-11-17 PROCEDURE — 99213 OFFICE O/P EST LOW 20 MIN: CPT | Performed by: INTERNAL MEDICINE

## 2021-11-17 PROCEDURE — 99211 OFF/OP EST MAY X REQ PHY/QHP: CPT | Performed by: INTERNAL MEDICINE

## 2021-11-17 ASSESSMENT — FIBROSIS 4 INDEX: FIB4 SCORE: 1.71

## 2021-11-17 NOTE — PROGRESS NOTES
"Chief Complaint   Patient presents with   • Thyrotoxicosis     Graves' disease   • Osteopenia        HPI:    Patient is clinically euthyroid taking low-dose methimazole 2.5 mg 4 days a week.  Her free T4 is normal at 1.3 and free T3 also mid range at 3.1.  TSH is borderline low at 0.26 with 0.3 being low normal.  She does not want to increase her methimazole to lower her dose any further.  She is very comfortable where she is at.  When awakening from sleep she will occasionally feel a little slight tremor internally but not a tremor of the hands.  No palpitations.       No significant Graves' ophthalmopathy.       Bone density done February this year was quite good in fact lumbar spine T- 0.6 and improved slightly and the femur stable at T- 1.0.    ROS:  All other systems reported as negative or unchanged since last exam      Allergies: No Known Allergies    Current medicines including changes today:  Current Outpatient Medications   Medication Sig Dispense Refill   • losartan (COZAAR) 100 MG Tab TAKE ONE TABLET BY MOUTH EVERY DAY 90 Tablet 3   • methimazole (TAPAZOLE) 5 MG Tab Take 0.5 Tablets by mouth every 48 hours. 30 Tablet 3   • Calcium Citrate (CITRACAL PO) Take 1,200 Units by mouth.     • DIGESTIVE ENZYMES PO Take  by mouth.     • ascorbic acid (ASCORBIC ACID) 500 MG Tab Take 500 mg by mouth every day.     • vitamin D (CHOLECALCIFEROL) 1000 UNIT Tab Take 1,000 Units by mouth every day.     • Turmeric Curcumin 500 MG Cap Take  by mouth. (Patient not taking: Reported on 11/17/2021)       No current facility-administered medications for this visit.        Past Medical History:   Diagnosis Date   • Hyperlipidemia    • Hypertension    • Hyperthyroidism     Graves' disease / thyrotropin receptor antibody = 6.2       PHYSICAL EXAM:    /72 (BP Location: Right arm, Patient Position: Sitting, BP Cuff Size: Adult)   Pulse 99   Resp 16   Ht 1.676 m (5' 6\")   Wt 97.9 kg (215 lb 14.4 oz)   SpO2 92%   BMI 34.85 " kg/m²     Gen.   appears healthy     Skin   appropriate for sex and age    HEENT      no definite changes of Graves' ophthalmopathy.  Conjugate gaze.  No diplopia    Neck   thyroid gland is relatively small and difficult to palpate    Heart  regular    Extremities  no edema    Neuro  gait and station normal, no tremor    Psych  appropriate, calm, pleasant    ASSESSMENT AND RECOMMENDATIONS    1. Hyperthyroidism           Well-controlled with low-dose methimazole 2.5 mg 4 days a week    2. Graves disease            No ophthalmolpathy       DISPOSITION: Repeat thyroid blood levels in 3 months and return to office in 6 months           I discussed my jail at the end of this year.  I introduced her to Marco A nurse practitioner who will assume her care next year.  She is in agreement.      Denny Ireland M.D.    Copies to: Maryse James M.D. [phone]

## 2021-12-20 ENCOUNTER — OFFICE VISIT (OUTPATIENT)
Dept: MEDICAL GROUP | Facility: LAB | Age: 71
End: 2021-12-20
Payer: MEDICARE

## 2021-12-20 VITALS
TEMPERATURE: 97.8 F | BODY MASS INDEX: 34.72 KG/M2 | WEIGHT: 216 LBS | RESPIRATION RATE: 16 BRPM | HEIGHT: 66 IN | OXYGEN SATURATION: 97 % | HEART RATE: 104 BPM | DIASTOLIC BLOOD PRESSURE: 82 MMHG | SYSTOLIC BLOOD PRESSURE: 144 MMHG

## 2021-12-20 DIAGNOSIS — H61.21 IMPACTED CERUMEN OF RIGHT EAR: ICD-10-CM

## 2021-12-20 PROCEDURE — 99213 OFFICE O/P EST LOW 20 MIN: CPT | Performed by: PHYSICIAN ASSISTANT

## 2021-12-20 ASSESSMENT — FIBROSIS 4 INDEX: FIB4 SCORE: 1.71

## 2021-12-20 NOTE — PROGRESS NOTES
"Subjective:     CC: Clogged ear    HPI:   Jasiel here today with clogged ear sensation.  Patient reports for the past week that she has had a clogged ear sensation and loss of hearing on the right ear.  Patient has tried several over-the-counter improvements with out improvement.  Denies dizziness or vertigo.  Denies nausea/vomiting/diarrhea.  Denies falls, head injuries.    She does note a history of sinusitis and chronic allergies.  She does not currently use a nasal decongestant.      ROS:  Gen: no fevers/chills, no changes in weight  Eyes: no changes in vision  ENT: no sore throat, no hearing loss, no bloody nose + right-sided hearing loss  Pulm: no sob, no cough  CV: no chest pain, no palpitations  GI: no nausea/vomiting, no diarrhea  : no dysuria  MSk: no myalgias  Skin: no rash  Neuro: no headaches, no numbness/tingling  Heme/Lymph: no easy bruising    Current Outpatient Medications Ordered in Epic   Medication Sig Dispense Refill   • losartan (COZAAR) 100 MG Tab TAKE ONE TABLET BY MOUTH EVERY DAY 90 Tablet 3   • methimazole (TAPAZOLE) 5 MG Tab Take 0.5 Tablets by mouth every 48 hours. 30 Tablet 3   • Turmeric Curcumin 500 MG Cap Take  by mouth. (Patient not taking: Reported on 11/17/2021)     • Calcium Citrate (CITRACAL PO) Take 1,200 Units by mouth.     • DIGESTIVE ENZYMES PO Take  by mouth.     • ascorbic acid (ASCORBIC ACID) 500 MG Tab Take 500 mg by mouth every day.     • vitamin D (CHOLECALCIFEROL) 1000 UNIT Tab Take 1,000 Units by mouth every day.       No current [facility]-ordered facility-administered medications on file.       Objective:     Exam:  /82   Pulse (!) 104   Temp 36.6 °C (97.8 °F)   Resp 16   Ht 1.676 m (5' 6\")   Wt 98 kg (216 lb)   SpO2 97%   BMI 34.86 kg/m²  Body mass index is 34.86 kg/m².    Gen: Alert and oriented, No apparent distress.  Neck: Neck is supple without lymphadenopathy.  Ears: Left ear canal clear, clear air-fluid level behind left tympanic membrane.  Right " ear canal has some earwax primarily on the right portion  Lungs: Normal effort, CTA bilaterally, no wheezes, rhonchi, or rales  CV: Regular rate and rhythm. No murmurs, rubs, or gallops.  Ext: No clubbing, cyanosis, edema.      Assessment & Plan:     71 y.o. female with the following -     Cerumen impaction  Cerumen impaction largely cleared today in clinic  Advised patient to also try nasal decongestant such as Flonase or Nasacort to relieve sinus congestion  ENT referral for chronic sinusitis    I spent a total of 20 minutes with record review, exam, communication with the patient, communication with other providers, and documentation of this encounter.      Return if symptoms worsen or fail to improve.    Please note that this dictation was created using voice recognition software. I have made every reasonable attempt to correct obvious errors, but there may be errors of grammar and possibly content that I did not discover before finalizing the note.

## 2021-12-20 NOTE — PROCEDURES
Procedure: Cerumen Removal  Risks and benefits of procedure discussed with patient.  Cerumen removed with lavage by the MA. Patient tolerated the procedure well    Post-lavage curette was performed by Holly Reeves PA-C. Post procedure exam with clear canal and normal TM.    Pt educated about proper care of ear canal. Q-tip cleaning discouraged, use of debrox and warm water lavage discussed.

## 2022-02-03 ENCOUNTER — HOSPITAL ENCOUNTER (OUTPATIENT)
Dept: RADIOLOGY | Facility: MEDICAL CENTER | Age: 72
End: 2022-02-03
Attending: FAMILY MEDICINE
Payer: MEDICARE

## 2022-02-03 DIAGNOSIS — Z12.31 ENCOUNTER FOR SCREENING MAMMOGRAM FOR BREAST CANCER: ICD-10-CM

## 2022-02-03 PROCEDURE — 77063 BREAST TOMOSYNTHESIS BI: CPT

## 2022-05-11 ENCOUNTER — HOSPITAL ENCOUNTER (OUTPATIENT)
Dept: LAB | Facility: MEDICAL CENTER | Age: 72
End: 2022-05-11
Attending: INTERNAL MEDICINE
Payer: MEDICARE

## 2022-05-11 DIAGNOSIS — E05.90 HYPERTHYROIDISM: ICD-10-CM

## 2022-05-11 PROCEDURE — 84443 ASSAY THYROID STIM HORMONE: CPT

## 2022-05-11 PROCEDURE — 84439 ASSAY OF FREE THYROXINE: CPT

## 2022-05-11 PROCEDURE — 36415 COLL VENOUS BLD VENIPUNCTURE: CPT

## 2022-05-11 PROCEDURE — 84481 FREE ASSAY (FT-3): CPT

## 2022-05-12 LAB
T3FREE SERPL-MCNC: 3.38 PG/ML (ref 2–4.4)
T4 FREE SERPL-MCNC: 1.74 NG/DL (ref 0.93–1.7)
TSH SERPL DL<=0.005 MIU/L-ACNC: 0.23 UIU/ML (ref 0.38–5.33)

## 2022-05-17 ENCOUNTER — TELEPHONE (OUTPATIENT)
Dept: MEDICAL GROUP | Facility: LAB | Age: 72
End: 2022-05-17

## 2022-05-17 ENCOUNTER — OFFICE VISIT (OUTPATIENT)
Dept: ENDOCRINOLOGY | Facility: MEDICAL CENTER | Age: 72
End: 2022-05-17
Payer: MEDICARE

## 2022-05-17 VITALS
BODY MASS INDEX: 34.87 KG/M2 | HEART RATE: 89 BPM | HEIGHT: 66 IN | SYSTOLIC BLOOD PRESSURE: 134 MMHG | WEIGHT: 217 LBS | OXYGEN SATURATION: 90 % | DIASTOLIC BLOOD PRESSURE: 82 MMHG

## 2022-05-17 DIAGNOSIS — E05.90 HYPERTHYROIDISM: ICD-10-CM

## 2022-05-17 DIAGNOSIS — E55.9 VITAMIN D DEFICIENCY: ICD-10-CM

## 2022-05-17 DIAGNOSIS — M85.89 OSTEOPENIA OF MULTIPLE SITES: ICD-10-CM

## 2022-05-17 DIAGNOSIS — E05.00 GRAVES DISEASE: ICD-10-CM

## 2022-05-17 PROCEDURE — 99214 OFFICE O/P EST MOD 30 MIN: CPT

## 2022-05-17 PROCEDURE — 99211 OFF/OP EST MAY X REQ PHY/QHP: CPT

## 2022-05-17 RX ORDER — METHIMAZOLE 5 MG/1
TABLET ORAL
Qty: 90 TABLET | Refills: 2 | Status: SHIPPED | OUTPATIENT
Start: 2022-05-17 | End: 2022-12-17

## 2022-05-17 ASSESSMENT — FIBROSIS 4 INDEX: FIB4 SCORE: 1.73

## 2022-05-17 NOTE — PROGRESS NOTES
Chief Complaint: Consult requested by Maryse James M.D. for evaluation of Hyperthyroidism    HPI:   1.  Hyperthyroidism:  Jasiel Garrett is a 72 y.o. female with history is here for initial evaluation.    She was diagnosed in 2017    On Methimazole half of tab of 2.5mg 4 times a week.    She reports the following symptoms:constipation (reports a history of ) and anxiousness (reported the loss of close friend), and body vibrations when she is asleep which she relates to her weightbearing exercises  She denies palpitations, tremors, frequent diarrhea    She denies lumps or enlargement in the neck.  Denies taking any biotin    She reports no family history of thyroid disease    She denies any recent IV contrast exposure.     She denies any recent URI or having neck pain.     Latest Reference Range & Units 05/11/22 10:15   TSH 0.380 - 5.330 uIU/mL 0.230 (L) [1]   Free T-4 0.93 - 1.70 ng/dL 1.74 (H)   T3,Free 2.00 - 4.40 pg/mL 3.38     She denies eye pain, eye swelling, gritty eye feeling    2.  Graves' disease:  Elevated antibody levels   Latest Reference Range & Units 05/04/21 10:21   Thyrotropin Receptor Abs <=1.75 IU/L 8.50 (H) [1]     3. Osteopenia:  DEXA scan on 2/1/2021 showed a lumbar T score -0.6 and L femur T score -1.0  DEXA scan on 12/11/2019 showed a lumbar T score -0.8 and L femur T score -1.1  DEXA scan on 8/29/2018 showed a lumbar T score -1.3 and L femur T score -1.3    Currently doing weight bearing exercises  Currently taking calcium 1200mg daily    4. Vitamin D deficiency:  Currently taking 2000IUs daily      Patient's medications, allergies, and social histories were reviewed and updated as appropriate.      ROS:     CONS:     No fever, no chills, no weight loss, no fatigue   EYES:      No diplopia, no blurry vision, no redness of eyes, no swelling of eyelids   ENT:    No hearing loss, No ear pain, No sore throat, no dysphagia, no neck swelling   CV:     No chest pain, no palpitations, no  claudication, no orthopnea, no PND   PULM:    No SOB, no cough, no hemoptysis, no wheezing    GI:   No nausea, no vomiting, no diarrhea, no constipation, no bloody stools   :  Passing urine well, no dysuria, no hematuria   ENDO:   No polyuria, no polydipsia, no heat intolerance, no cold intolerance   NEURO: No headaches, no dizziness, no convulsions, no tremors   MUSC:  No joint swellings, no arthralgias, no myalgias, no weakness   SKIN:   No rash, no ulcers, no dry skin   PSYCH:   No depression, no anxiety, no difficulty sleeping       Past Medical History:  Patient Active Problem List    Diagnosis Date Noted   • Vitamin D deficiency 10/27/2020   • Post-menopause 10/24/2019   • History of colonic polyps 10/22/2019   • Osteopenia of multiple sites 10/12/2019   • Prediabetes 07/31/2018   • Graves disease 04/05/2018   • Hyperthyroidism 07/14/2017   • Mixed hyperlipidemia 03/01/2017   • Essential hypertension 03/01/2017   • Class 2 obesity without serious comorbidity with body mass index (BMI) of 35.0 to 35.9 in adult 03/01/2017   • Primary osteoarthritis of both knees 03/01/2017       Past Surgical History:  Past Surgical History:   Procedure Laterality Date   • HYSTEROSCOPY WITH VIDEO DIAGNOSTIC          Allergies:  Patient has no known allergies.     Current Medications:    Current Outpatient Medications:   •  losartan (COZAAR) 100 MG Tab, TAKE ONE TABLET BY MOUTH EVERY DAY, Disp: 90 Tablet, Rfl: 3  •  methimazole (TAPAZOLE) 5 MG Tab, Take 0.5 Tablets by mouth every 48 hours., Disp: 30 Tablet, Rfl: 3  •  Turmeric Curcumin 500 MG Cap, Take  by mouth. (Patient not taking: Reported on 11/17/2021), Disp: , Rfl:   •  Calcium Citrate (CITRACAL PO), Take 1,200 Units by mouth., Disp: , Rfl:   •  DIGESTIVE ENZYMES PO, Take  by mouth., Disp: , Rfl:   •  ascorbic acid (ASCORBIC ACID) 500 MG Tab, Take 500 mg by mouth every day., Disp: , Rfl:   •  vitamin D (CHOLECALCIFEROL) 1000 UNIT Tab, Take 1,000 Units by mouth every day.,  "Disp: , Rfl:     Social History:  Social History     Socioeconomic History   • Marital status:      Spouse name: Not on file   • Number of children: Not on file   • Years of education: Not on file   • Highest education level: Not on file   Occupational History   • Not on file   Tobacco Use   • Smoking status: Former Smoker     Packs/day: 0.50     Years: 20.00     Pack years: 10.00     Quit date: 3/1/1997     Years since quittin.2   • Smokeless tobacco: Never Used   Vaping Use   • Vaping Use: Never used   Substance and Sexual Activity   • Alcohol use: Yes     Alcohol/week: 1.2 oz     Types: 2 Glasses of wine per week   • Drug use: No   • Sexual activity: Yes     Partners: Male   Other Topics Concern   • Not on file   Social History Narrative   • Not on file     Social Determinants of Health     Financial Resource Strain: Not on file   Food Insecurity: Not on file   Transportation Needs: Not on file   Physical Activity: Not on file   Stress: Not on file   Social Connections: Not on file   Intimate Partner Violence: Not on file   Housing Stability: Not on file        Family History:   Family History   Problem Relation Age of Onset   • Arthritis Mother    • Heart Disease Father         arteriosclerosis   • Alcohol/Drug Sister    • Alcohol/Drug Brother          PHYSICAL EXAM:   Vital signs: /82 (BP Location: Left arm, Patient Position: Sitting, BP Cuff Size: Large adult)   Pulse 89   Ht 1.676 m (5' 6\")   Wt 98.4 kg (217 lb)   SpO2 90%   BMI 35.02 kg/m²   GENERAL: Well-developed, well-nourished  in no apparent distress.   EYE: No ocular and eyelid asymmetry, Anicteric sclerae,  PERRL, No exophthalmos or lidlag  HENT: Hearing grossly intact, Normocephalic, atraumatic.   NECK: Supple. Trachea midline. thyroid is small in size without nodules or tenderness  CARDIOVASCULAR: Regular rate and rhythm. No murmurs, rubs, or gallops.   LUNGS: Clear to auscultation bilaterally   EXTREMITIES: No clubbing, " cyanosis, or edema.   NEUROLOGICAL: Cranial nerves II-XII are grossly intact   Symmetric reflexes at the patella no proximal muscle weakness, No visible tremor with both outstretched hands  LYMPH: No cervical, supraclavicular,  adenopathy palpated.   SKIN: No rashes, lesions. Turgor is normal.    Labs:  Lab Results   Component Value Date/Time    WBC 5.3 11/08/2021 09:42 AM    RBC 5.09 11/08/2021 09:42 AM    HEMOGLOBIN 16.2 (H) 11/08/2021 09:42 AM    MCV 97.8 11/08/2021 09:42 AM    MCH 31.8 11/08/2021 09:42 AM    MCHC 32.5 (L) 11/08/2021 09:42 AM    RDW 46.5 11/08/2021 09:42 AM    MPV 10.9 11/08/2021 09:42 AM       Lab Results   Component Value Date/Time    SODIUM 142 11/08/2021 09:42 AM    POTASSIUM 4.4 11/08/2021 09:42 AM    CHLORIDE 106 11/08/2021 09:42 AM    CO2 26 11/08/2021 09:42 AM    ANION 10.0 11/08/2021 09:42 AM    GLUCOSE 94 11/08/2021 09:42 AM    BUN 11 11/08/2021 09:42 AM    CREATININE 0.81 11/08/2021 09:42 AM    CALCIUM 9.6 11/08/2021 09:42 AM    ASTSGOT 21 11/08/2021 09:42 AM    ALTSGPT 17 11/08/2021 09:42 AM    TBILIRUBIN 0.7 11/08/2021 09:42 AM    ALBUMIN 4.2 11/08/2021 09:42 AM    TOTPROTEIN 7.5 11/08/2021 09:42 AM    GLOBULIN 3.3 11/08/2021 09:42 AM    AGRATIO 1.3 11/08/2021 09:42 AM       Lab Results   Component Value Date/Time    TSHULTRASEN 0.230 (L) 05/11/2022 1015     Lab Results   Component Value Date/Time    FREET4 1.74 (H) 05/11/2022 1015     Lab Results   Component Value Date/Time    FREET3 3.38 05/11/2022 1015     Imaging:      ASSESSMENT/PLAN:   1. Hyperthyroidism  Clinically unstable  Biochemically unstable    Hyperthyroidism pathophysiology discussed, signs and symptoms  Hyperthyroidism treatment methods methimazole, radioactive iodine, surgery discussed    At this time patient would like to continue taking methimazole  Medication titrated up to 2.5 mg 6 days a week from 2.5 mg 4 days a week    We will evaluate in 2 months  - TSH; Future  - FREE THYROXINE; Future  - T3 FREE; Future  -  Comp Metabolic Panel; Future  - CBC WITH DIFFERENTIAL; Future    Ultrasound ordered for evaluation  - US-SOFT TISSUES OF HEAD - NECK; Future    Prescription sent to pharmacy  - methimazole (TAPAZOLE) 5 MG Tab; Take 2.5mg 6 days/week  Dispense: 90 Tablet; Refill: 2    2. Graves disease  Etiology of her hyperthyroidism  - Comp Metabolic Panel; Future  - CBC WITH DIFFERENTIAL; Future    3. Osteopenia of multiple sites  Stable  Continue taking calcium and vitamin D  Continue weightbearing exercises  We will repeat the DEXA scan around February 2023  - Comp Metabolic Panel; Future  - CBC WITH DIFFERENTIAL; Future    4. Vitamin D deficiency  I will evaluate levels  Continue regimen-2000 IUs OTC of vitamin D3 daily  - VITAMIN D,25 HYDROXY; Future    Disposition: Make an appointment to follow-up in 2 months  Do your blood work before your next appointment  No biotin 1 week prior to thyroid blood work    Thank you kindly for allowing me to participate in the thyroid care plan for this patient.    HERMES Crum.P.R.N.  05/17/22    CC:   Maryse James M.D.

## 2022-06-11 ENCOUNTER — HOSPITAL ENCOUNTER (OUTPATIENT)
Dept: RADIOLOGY | Facility: MEDICAL CENTER | Age: 72
End: 2022-06-11
Payer: MEDICARE

## 2022-06-11 DIAGNOSIS — E05.90 HYPERTHYROIDISM: ICD-10-CM

## 2022-06-11 PROCEDURE — 76536 US EXAM OF HEAD AND NECK: CPT

## 2022-07-05 DIAGNOSIS — E04.1 THYROID NODULE: ICD-10-CM

## 2022-07-20 ENCOUNTER — HOSPITAL ENCOUNTER (OUTPATIENT)
Dept: RADIOLOGY | Facility: MEDICAL CENTER | Age: 72
End: 2022-07-20
Payer: MEDICARE

## 2022-07-20 ENCOUNTER — HOSPITAL ENCOUNTER (OUTPATIENT)
Dept: LAB | Facility: MEDICAL CENTER | Age: 72
End: 2022-07-20
Payer: MEDICARE

## 2022-07-20 DIAGNOSIS — E04.1 THYROID NODULE: ICD-10-CM

## 2022-07-20 DIAGNOSIS — E55.9 VITAMIN D DEFICIENCY: ICD-10-CM

## 2022-07-20 DIAGNOSIS — E05.00 GRAVES DISEASE: ICD-10-CM

## 2022-07-20 DIAGNOSIS — M85.89 OSTEOPENIA OF MULTIPLE SITES: ICD-10-CM

## 2022-07-20 DIAGNOSIS — E05.90 HYPERTHYROIDISM: ICD-10-CM

## 2022-07-20 LAB
25(OH)D3 SERPL-MCNC: 36 NG/ML (ref 30–100)
ALBUMIN SERPL BCP-MCNC: 4.1 G/DL (ref 3.2–4.9)
ALBUMIN/GLOB SERPL: 1.3 G/DL
ALP SERPL-CCNC: 69 U/L (ref 30–99)
ALT SERPL-CCNC: 16 U/L (ref 2–50)
ANION GAP SERPL CALC-SCNC: 14 MMOL/L (ref 7–16)
AST SERPL-CCNC: 22 U/L (ref 12–45)
BASOPHILS # BLD AUTO: 0.8 % (ref 0–1.8)
BASOPHILS # BLD: 0.04 K/UL (ref 0–0.12)
BILIRUB SERPL-MCNC: 0.6 MG/DL (ref 0.1–1.5)
BUN SERPL-MCNC: 19 MG/DL (ref 8–22)
CALCIUM SERPL-MCNC: 9.6 MG/DL (ref 8.5–10.5)
CHLORIDE SERPL-SCNC: 104 MMOL/L (ref 96–112)
CO2 SERPL-SCNC: 22 MMOL/L (ref 20–33)
CREAT SERPL-MCNC: 0.88 MG/DL (ref 0.5–1.4)
EOSINOPHIL # BLD AUTO: 0.4 K/UL (ref 0–0.51)
EOSINOPHIL NFR BLD: 7.6 % (ref 0–6.9)
ERYTHROCYTE [DISTWIDTH] IN BLOOD BY AUTOMATED COUNT: 50.4 FL (ref 35.9–50)
FASTING STATUS PATIENT QL REPORTED: NORMAL
GFR SERPLBLD CREATININE-BSD FMLA CKD-EPI: 70 ML/MIN/1.73 M 2
GLOBULIN SER CALC-MCNC: 3.1 G/DL (ref 1.9–3.5)
GLUCOSE SERPL-MCNC: 101 MG/DL (ref 65–99)
HCT VFR BLD AUTO: 49.2 % (ref 37–47)
HGB BLD-MCNC: 16.2 G/DL (ref 12–16)
IMM GRANULOCYTES # BLD AUTO: 0.01 K/UL (ref 0–0.11)
IMM GRANULOCYTES NFR BLD AUTO: 0.2 % (ref 0–0.9)
LYMPHOCYTES # BLD AUTO: 1.7 K/UL (ref 1–4.8)
LYMPHOCYTES NFR BLD: 32.2 % (ref 22–41)
MCH RBC QN AUTO: 32.6 PG (ref 27–33)
MCHC RBC AUTO-ENTMCNC: 32.9 G/DL (ref 33.6–35)
MCV RBC AUTO: 99 FL (ref 81.4–97.8)
MONOCYTES # BLD AUTO: 0.51 K/UL (ref 0–0.85)
MONOCYTES NFR BLD AUTO: 9.7 % (ref 0–13.4)
NEUTROPHILS # BLD AUTO: 2.62 K/UL (ref 2–7.15)
NEUTROPHILS NFR BLD: 49.5 % (ref 44–72)
NRBC # BLD AUTO: 0 K/UL
NRBC BLD-RTO: 0 /100 WBC
PATHOLOGY CONSULT NOTE: NORMAL
PLATELET # BLD AUTO: 218 K/UL (ref 164–446)
PMV BLD AUTO: 11 FL (ref 9–12.9)
POTASSIUM SERPL-SCNC: 4.1 MMOL/L (ref 3.6–5.5)
PROT SERPL-MCNC: 7.2 G/DL (ref 6–8.2)
RBC # BLD AUTO: 4.97 M/UL (ref 4.2–5.4)
SODIUM SERPL-SCNC: 140 MMOL/L (ref 135–145)
T3FREE SERPL-MCNC: 2.33 PG/ML (ref 2–4.4)
T4 FREE SERPL-MCNC: 1.24 NG/DL (ref 0.93–1.7)
TSH SERPL DL<=0.005 MIU/L-ACNC: 0.83 UIU/ML (ref 0.38–5.33)
WBC # BLD AUTO: 5.3 K/UL (ref 4.8–10.8)

## 2022-07-20 PROCEDURE — 84481 FREE ASSAY (FT-3): CPT

## 2022-07-20 PROCEDURE — 84443 ASSAY THYROID STIM HORMONE: CPT

## 2022-07-20 PROCEDURE — 88112 CYTOPATH CELL ENHANCE TECH: CPT

## 2022-07-20 PROCEDURE — 85025 COMPLETE CBC W/AUTO DIFF WBC: CPT

## 2022-07-20 PROCEDURE — 36415 COLL VENOUS BLD VENIPUNCTURE: CPT

## 2022-07-20 PROCEDURE — 84439 ASSAY OF FREE THYROXINE: CPT

## 2022-07-20 PROCEDURE — 10005 FNA BX W/US GDN 1ST LES: CPT

## 2022-07-20 PROCEDURE — 80053 COMPREHEN METABOLIC PANEL: CPT

## 2022-07-20 PROCEDURE — 82306 VITAMIN D 25 HYDROXY: CPT

## 2022-07-20 NOTE — PROGRESS NOTES
Outpatient Interventional Radiology RN Note:    US-guided fine needle aspiration of Right thyroid nodule completed by Dr. Odonnell; Procedure explained by MD prior to start and consent obtained, all questions/concerns addressed; Site marked and visualized; No procedural sedation required.    Procedure completed via Right anterior neck; 1 jar of cytolyt obtained and sent to pathology for analysis; Puncture site covered with bandaid upon completion.    Patient tolerated the procedure well; Provided with appropriate discharge education, all questions/concerns addressed; Patient discharged home.

## 2022-08-02 ENCOUNTER — OFFICE VISIT (OUTPATIENT)
Dept: ENDOCRINOLOGY | Facility: MEDICAL CENTER | Age: 72
End: 2022-08-02
Payer: MEDICARE

## 2022-08-02 VITALS
HEART RATE: 62 BPM | OXYGEN SATURATION: 94 % | DIASTOLIC BLOOD PRESSURE: 72 MMHG | BODY MASS INDEX: 35.68 KG/M2 | HEIGHT: 66 IN | SYSTOLIC BLOOD PRESSURE: 124 MMHG | WEIGHT: 222 LBS

## 2022-08-02 DIAGNOSIS — R73.9 ELEVATED SERUM GLUCOSE: ICD-10-CM

## 2022-08-02 DIAGNOSIS — E04.2 MULTIPLE THYROID NODULES: ICD-10-CM

## 2022-08-02 DIAGNOSIS — E05.00 GRAVES DISEASE: ICD-10-CM

## 2022-08-02 DIAGNOSIS — E55.9 VITAMIN D DEFICIENCY: ICD-10-CM

## 2022-08-02 DIAGNOSIS — E05.90 HYPERTHYROIDISM: ICD-10-CM

## 2022-08-02 DIAGNOSIS — M85.89 OSTEOPENIA OF MULTIPLE SITES: ICD-10-CM

## 2022-08-02 PROCEDURE — 99214 OFFICE O/P EST MOD 30 MIN: CPT

## 2022-08-02 PROCEDURE — 99211 OFF/OP EST MAY X REQ PHY/QHP: CPT

## 2022-08-02 ASSESSMENT — FIBROSIS 4 INDEX: FIB4 SCORE: 1.79

## 2022-08-02 NOTE — PROGRESS NOTES
Chief Complaint: Follow-up on the following conditions    HPI:   1.  Hyperthyroidism:  Jasiel Garrett is a 72 y.o. female with history is here for initial evaluation.    She was diagnosed in 2017    On Methimazole 2.5 mg 6 days a week    She reports constipation (reports a history of )-this better, anxiousness (reported the loss of close friend)-this is better, edema to lower extremities and inability to lose weight  She denies palpitations, tremors, frequent diarrhea, body vibrations     Denies taking any biotin or multivitamin       Latest Reference Range & Units 07/20/22 08:09   TSH 0.380 - 5.330 uIU/mL 0.830   Free T-4 0.93 - 1.70 ng/dL 1.24   T3,Free 2.00 - 4.40 pg/mL 2.33      Latest Reference Range & Units 05/11/22 10:15   TSH 0.380 - 5.330 uIU/mL 0.230 (L) [1]   Free T-4 0.93 - 1.70 ng/dL 1.74 (H)   T3,Free 2.00 - 4.40 pg/mL 3.38     She denies eye pain, eye swelling, gritty eye feeling    2.  Graves' disease:  Elevated antibody levels   Latest Reference Range & Units 05/04/21 10:21   Thyrotropin Receptor Abs <=1.75 IU/L 8.50 (H) [1]     3.  Multiple thyroid nodules:  Thyroid ultrasound done on 6/11/2022 showed heterogeneous gland with normal vascularity  Nodule #1 located in the right middle thyroid lobe measuring 1.05 cm, mixed hypoechoic, with peripheral echogenic foci TR 4  Multiple subcentimeter nodules    FNA biopsy on 7/20/2022 of right middle thyroid nodule show a [place] category 2-benign    4. Osteopenia:  DEXA scan on 2/1/2021 showed a lumbar T score -0.6 and L femur T score -1.0  DEXA scan on 12/11/2019 showed a lumbar T score -0.8 and L femur T score -1.1  DEXA scan on 8/29/2018 showed a lumbar T score -1.3 and L femur T score -1.3    Currently doing weight bearing exercises  Currently taking calcium 1200mg daily    5. Vitamin D deficiency:  Currently taking 2000IUs daily   Latest Reference Range & Units 07/20/22 08:09   25-Hydroxy   Vitamin D 25 30 - 100 ng/mL 36     6.  Elevated serum  glucose:  Reports concern with insulin resistance due to mildly elevated serum glucose levels    Patient's medications, allergies, and social histories were reviewed and updated as appropriate.      ROS:     CONS:     No fever, no chills, no weight loss, no fatigue   EYES:      No diplopia, no blurry vision, no redness of eyes, no swelling of eyelids   ENT:    No hearing loss, No ear pain, No sore throat, no dysphagia, no neck swelling   CV:     No chest pain, no palpitations, no claudication, no orthopnea, no PND   PULM:    No SOB, no cough, no hemoptysis, no wheezing    GI:   No nausea, no vomiting, no diarrhea, no constipation, no bloody stools   :  Passing urine well, no dysuria, no hematuria   ENDO:   No polyuria, no polydipsia, no heat intolerance, no cold intolerance   NEURO: No headaches, no dizziness, no convulsions, no tremors   MUSC:  No joint swellings, no arthralgias, no myalgias, no weakness   SKIN:   No rash, no ulcers, no dry skin   PSYCH:   No depression, no anxiety, no difficulty sleeping       Past Medical History:  Patient Active Problem List    Diagnosis Date Noted   • Vitamin D deficiency 10/27/2020   • Post-menopause 10/24/2019   • History of colonic polyps 10/22/2019   • Osteopenia of multiple sites 10/12/2019   • Prediabetes 07/31/2018   • Graves disease 04/05/2018   • Hyperthyroidism 07/14/2017   • Mixed hyperlipidemia 03/01/2017   • Essential hypertension 03/01/2017   • Class 2 obesity without serious comorbidity with body mass index (BMI) of 35.0 to 35.9 in adult 03/01/2017   • Primary osteoarthritis of both knees 03/01/2017       Past Surgical History:  Past Surgical History:   Procedure Laterality Date   • HYSTEROSCOPY WITH VIDEO DIAGNOSTIC          Allergies:  Patient has no known allergies.     Current Medications:    Current Outpatient Medications:   •  NON SPECIFIED, [place]'s [place], Disp: , Rfl:   •  methimazole (TAPAZOLE) 5 MG Tab, Take 2.5mg 6 days/week, Disp: 90 Tablet, Rfl: 2  •   "losartan (COZAAR) 100 MG Tab, TAKE ONE TABLET BY MOUTH EVERY DAY, Disp: 90 Tablet, Rfl: 3  •  Calcium Citrate (CITRACAL PO), Take 1,200 Units by mouth., Disp: , Rfl:   •  DIGESTIVE ENZYMES PO, Take  by mouth., Disp: , Rfl:   •  ascorbic acid (ASCORBIC ACID) 500 MG Tab, Take 500 mg by mouth every day., Disp: , Rfl:   •  vitamin D (CHOLECALCIFEROL) 1000 UNIT Tab, Take 1,000 Units by mouth every day., Disp: , Rfl:     Social History:  Social History     Socioeconomic History   • Marital status:      Spouse name: Not on file   • Number of children: Not on file   • Years of education: Not on file   • Highest education level: Not on file   Occupational History   • Not on file   Tobacco Use   • Smoking status: Former Smoker     Packs/day: 0.50     Years: 20.00     Pack years: 10.00     Quit date: 3/1/1997     Years since quittin.4   • Smokeless tobacco: Never Used   Vaping Use   • Vaping Use: Never used   Substance and Sexual Activity   • Alcohol use: Yes     Alcohol/week: 1.2 oz     Types: 2 Glasses of wine per week   • Drug use: No   • Sexual activity: Yes     Partners: Male   Other Topics Concern   • Not on file   Social History Narrative   • Not on file     Social Determinants of Health     Financial Resource Strain: Not on file   Food Insecurity: Not on file   Transportation Needs: Not on file   Physical Activity: Not on file   Stress: Not on file   Social Connections: Not on file   Intimate Partner Violence: Not on file   Housing Stability: Not on file        Family History:   Family History   Problem Relation Age of Onset   • Arthritis Mother    • Heart Disease Father         arteriosclerosis   • Alcohol/Drug Sister    • Alcohol/Drug Brother          PHYSICAL EXAM:   Vital signs: /72   Pulse 62   Ht 1.676 m (5' 6\")   Wt 101 kg (222 lb)   SpO2 94%   BMI 35.83 kg/m²   GENERAL: Well-developed, well-nourished  in no apparent distress.   EYE: No ocular and eyelid asymmetry, Anicteric sclerae,  " PERRL, No exophthalmos or lidlag  HENT: Hearing grossly intact, Normocephalic, atraumatic.   NECK: Supple. Trachea midline. thyroid is small in size without nodules or tenderness  CARDIOVASCULAR: Regular rate and rhythm. No murmurs, rubs, or gallops.   LUNGS: Clear to auscultation bilaterally   EXTREMITIES: No clubbing, cyanosis, 1+ pitting edema to lower extremities.   NEUROLOGICAL: Cranial nerves II-XII are grossly intact   Symmetric reflexes at the patella no proximal muscle weakness, No visible tremor with both outstretched hands  LYMPH: No cervical, supraclavicular,  adenopathy palpated.   SKIN: No rashes, lesions. Turgor is normal.    Labs:  Lab Results   Component Value Date/Time    WBC 5.3 07/20/2022 08:09 AM    RBC 4.97 07/20/2022 08:09 AM    HEMOGLOBIN 16.2 (H) 07/20/2022 08:09 AM    MCV 99.0 (H) 07/20/2022 08:09 AM    MCH 32.6 07/20/2022 08:09 AM    MCHC 32.9 (L) 07/20/2022 08:09 AM    RDW 50.4 (H) 07/20/2022 08:09 AM    MPV 11.0 07/20/2022 08:09 AM       Lab Results   Component Value Date/Time    SODIUM 140 07/20/2022 08:09 AM    POTASSIUM 4.1 07/20/2022 08:09 AM    CHLORIDE 104 07/20/2022 08:09 AM    CO2 22 07/20/2022 08:09 AM    ANION 14.0 07/20/2022 08:09 AM    GLUCOSE 101 (H) 07/20/2022 08:09 AM    BUN 19 07/20/2022 08:09 AM    CREATININE 0.88 07/20/2022 08:09 AM    CALCIUM 9.6 07/20/2022 08:09 AM    ASTSGOT 22 07/20/2022 08:09 AM    ALTSGPT 16 07/20/2022 08:09 AM    TBILIRUBIN 0.6 07/20/2022 08:09 AM    ALBUMIN 4.1 07/20/2022 08:09 AM    TOTPROTEIN 7.2 07/20/2022 08:09 AM    GLOBULIN 3.1 07/20/2022 08:09 AM    AGRATIO 1.3 07/20/2022 08:09 AM       Lab Results   Component Value Date/Time    TSHULTRASEN 0.230 (L) 05/11/2022 1015     Lab Results   Component Value Date/Time    FREET4 1.74 (H) 05/11/2022 1015     Lab Results   Component Value Date/Time    FREET3 3.38 05/11/2022 1015     Imaging:      ASSESSMENT/PLAN:   1. Hyperthyroidism  Biochemically stable  Clinically unstable with complaints of edema  to lower extremities and inability to lose weight-this is possibly related to the fact that her TSH has been suppressed in 2016, patient reports this is the first time that her TSH has been within normal range  Discussed with patient that edema is one of the side effects of taking methimazole-1+ pitting edema noted to lower extremities-we will evaluate in 3 months    At this time we will continue the same dose of methimazole- 2.5 mg 6 days a week and evaluate in 3 months  - TSH; Future  - FREE THYROXINE; Future  - Comp Metabolic Panel; Future  - T3 FREE; Future    2. Graves disease  This is etiology of her hyperthyroidism    3. Multiple thyroid nodules  Stable  We will repeat ultrasound of thyroid around June 2023    4. Osteopenia of multiple sites  Stable  We will repeat DEXA scan around February 2023  Continue weightbearing exercises  Continue calcium supplementation-1200 mg daily  Continue vitamin D supplementation-2000 IUs OTC daily of vitamin D3    5. Vitamin D deficiency  Unstable  Continue regimen-2000 IUs OTC daily vitamin D3    6. Elevated serum glucose  Unstable    - C-PEPTIDE; Future  - YASIR-65; Future  - HEMOGLOBIN A1C; Future  - IA-2 AUTOANTIBODIES      Disposition: Make an appointment to follow-up in 3 months  Do your blood work before your next appointment  No biotin 1 week prior to thyroid blood work    Thank you kindly for allowing me to participate in the thyroid care plan for this patient.    Marco A Gracia, A.P.R.N.  08/02/2022    CC:   Maryse James M.D.

## 2022-08-25 ENCOUNTER — TELEPHONE (OUTPATIENT)
Dept: MEDICAL GROUP | Facility: LAB | Age: 72
End: 2022-08-25
Payer: MEDICARE

## 2022-08-25 DIAGNOSIS — M17.0 PRIMARY OSTEOARTHRITIS OF BOTH KNEES: ICD-10-CM

## 2022-08-25 NOTE — TELEPHONE ENCOUNTER
Can you sign referral pending.  has been emailing and says that Dr Kwan Fleming will get her in this week

## 2022-09-27 ENCOUNTER — OFFICE VISIT (OUTPATIENT)
Dept: PHYSICAL MEDICINE AND REHAB | Facility: MEDICAL CENTER | Age: 72
End: 2022-09-27
Payer: MEDICARE

## 2022-09-27 ENCOUNTER — HOSPITAL ENCOUNTER (OUTPATIENT)
Dept: RADIOLOGY | Facility: MEDICAL CENTER | Age: 72
End: 2022-09-27
Attending: PHYSICAL MEDICINE & REHABILITATION
Payer: MEDICARE

## 2022-09-27 VITALS
TEMPERATURE: 97.8 F | SYSTOLIC BLOOD PRESSURE: 128 MMHG | HEIGHT: 66 IN | WEIGHT: 219.3 LBS | OXYGEN SATURATION: 93 % | BODY MASS INDEX: 35.24 KG/M2 | DIASTOLIC BLOOD PRESSURE: 82 MMHG | HEART RATE: 85 BPM

## 2022-09-27 DIAGNOSIS — M17.12 PRIMARY OSTEOARTHRITIS OF LEFT KNEE: ICD-10-CM

## 2022-09-27 DIAGNOSIS — E66.01 MORBID (SEVERE) OBESITY DUE TO EXCESS CALORIES (HCC): ICD-10-CM

## 2022-09-27 DIAGNOSIS — M25.562 CHRONIC PAIN OF LEFT KNEE: ICD-10-CM

## 2022-09-27 DIAGNOSIS — G89.29 CHRONIC PAIN OF LEFT KNEE: ICD-10-CM

## 2022-09-27 PROCEDURE — 99203 OFFICE O/P NEW LOW 30 MIN: CPT | Performed by: PHYSICAL MEDICINE & REHABILITATION

## 2022-09-27 PROCEDURE — 73562 X-RAY EXAM OF KNEE 3: CPT | Mod: LT

## 2022-09-27 RX ORDER — ACETAMINOPHEN 500 MG
1000 TABLET ORAL
COMMUNITY

## 2022-09-27 ASSESSMENT — PATIENT HEALTH QUESTIONNAIRE - PHQ9: CLINICAL INTERPRETATION OF PHQ2 SCORE: 0

## 2022-09-27 ASSESSMENT — PAIN SCALES - GENERAL: PAINLEVEL: 2=MINIMAL-SLIGHT

## 2022-09-27 ASSESSMENT — FIBROSIS 4 INDEX: FIB4 SCORE: 1.79

## 2022-09-27 NOTE — PROGRESS NOTES
"New patient note    Physiatry (physical medicine and  Rehabilitation), interventional spine and sports medicine    Date of Service: 9/27/2022    Chief complaint:   Chief Complaint   Patient presents with    New Patient     Left knee pain       HISTORY    HPI: Madiha Garrett 72 y.o. female who presents today for evaluation of left knee pain.  She reports that this is \"bone on bone\" and she uses topical creams with CBD and tylenol 1000mg once a day.  Takes this either morning or afternoon.    She reports that pain started gradually and worsened with time.  No particular injury.  In the past, she had right TKA in 2018.  This is doing well.  Planned to do the left, held off by COVID.       Uses a recumbent bike and does stretches.  She has not had injections.  Not sure if she wants knee replacement at this time.    Pain is worse at night and in the morning.  Walking is painful.  Weight loss has been challenging for her, ongoing Endocrinology issues related to hyperthyroidism    No back pain.  No numbness or tingling, no radicular symptoms.    Medical records review:  I reviewed the note from the referring provider Robbie Sweet* dated 08/25/2022.  Telephone encounter referral.     Previous treatments:    Physical Therapy: Yes    Medications the patient is tried: tylenol, topical voltaren was not particularly helpful.    Previous interventions: none    Previous surgeries to relieve the above pain:  none      ROS:   Red Flags ROS:   Fever, Chills, Sweats: Denies  Involuntary Weight Loss: Denies  Bladder Incontinence: Denies  Bowel Incontinence: Denies  Saddle Anesthesia: Denies    All other systems reviewed and negative.       PMHx:   Past Medical History:   Diagnosis Date    Hyperlipidemia     Hypertension     Hyperthyroidism     Graves' disease / thyrotropin receptor antibody = 6.2       PSHx:   Past Surgical History:   Procedure Laterality Date    KNEE REPLACEMENT, TOTAL Right 09/27/2018    Approximate date " "in 2018 [facility]    HYSTEROSCOPY WITH VIDEO DIAGNOSTIC         Family history   Family History   Problem Relation Age of Onset    Arthritis Mother     Heart Disease Father         arteriosclerosis    Alcohol/Drug Sister     Alcohol/Drug Brother          Medications:   Current Outpatient Medications   Medication    NON SPECIFIED    methimazole (TAPAZOLE) 5 MG Tab    losartan (COZAAR) 100 MG Tab    Calcium Citrate (CITRACAL PO)    DIGESTIVE ENZYMES PO    ascorbic acid (ASCORBIC ACID) 500 MG Tab    vitamin D (CHOLECALCIFEROL) 1000 UNIT Tab     No current facility-administered medications for this visit.       Allergies:   No Known Allergies    Social Hx:   Social History     Socioeconomic History    Marital status:      Spouse name: Not on file    Number of children: Not on file    Years of education: Not on file    Highest education level: Not on file   Occupational History    Not on file   Tobacco Use    Smoking status: Former     Packs/day: 0.50     Years: 20.00     Pack years: 10.00     Types: Cigarettes     Quit date: 3/1/1997     Years since quittin.5    Smokeless tobacco: Never   Vaping Use    Vaping Use: Never used   Substance and Sexual Activity    Alcohol use: Yes     Alcohol/week: 1.2 oz     Types: 2 Glasses of wine per week    Drug use: No    Sexual activity: Yes     Partners: Male   Other Topics Concern    Not on file   Social History Narrative    Not on file     Social Determinants of Health     Financial Resource Strain: Not on file   Food Insecurity: Not on file   Transportation Needs: Not on file   Physical Activity: Not on file   Stress: Not on file   Social Connections: Not on file   Intimate Partner Violence: Not on file   Housing Stability: Not on file         EXAMINATION     Physical Exam:   Vitals: /82 (BP Location: Left arm, Patient Position: Sitting, BP Cuff Size: Adult long)   Pulse 85   Temp 36.6 °C (97.8 °F) (Temporal)   Ht 1.676 m (5' 6\")   " Wt 99.5 kg (219 lb 4.8 oz)   SpO2 93%     Constitutional:   Body Habitus: Body mass index is 35.4 kg/m².  Cooperation: Fully cooperates with exam  Appearance: Well-groomed, well-nourished, not disheveled, in no acute distress    Eyes: No scleral icterus, no proptosis     ENT -no obvious auditory deficits, wearing a face mask     Skin -no rashes or lesions noted     Respiratory-  breathing comfortable on room air, no audible wheezing    Cardiovascular- capillary refills less than 2 seconds. No lower extremity edema is noted.     Psychiatric- alert and oriented ×3. Normal affect.     Gait - normal, mildly antalgic gait, no use of ambulatory device.  Balance is appropriate..     Musculoskeletal -   Cervical spine   Inspection: No deformities of the skin over the cervical spine. No rashes or lesions.    Thoracic/Lumbar Spine/Sacral Spine/Hips   Inspection: No evidence of atrophy in bilateral lower extremities throughout     Palpation:   Tenderness over the left medial joint line greater than the lateral joint line.  No medial or lateral instability.  Negative Lachman's test.    Well-healed surgical scar over the right knee consistent with TKA    Lumbar spine Special tests  Neuro tension  Straight leg test negative bilaterally      HIP  Range of motion in the hips is within normal limits in internal and external rotation bilaterally    SI joint tests  Observation patient sits on one buttocks: Negative  GRAHAM test negative bilaterally       Neuro       Key points for the international standards for neurological classification of spinal cord injury (ISNCSCI) to light touch.     Dermatome R L   L2 2 2   L3 2 2   L4 2 2   L5 2 2   S1 2 2   S2 2 2       Motor Exam Lower Extremities    ? Myotome R L   Hip flexion L2 5 5   Knee extension L3 5 5   Ankle dorsiflexion L4 5 5   Toe extension L5 5 5   Ankle plantarflexion S1 5 5     Clonus of the ankle negative bilaterally     Reflexes  ?  R L   Patella  N/A  S/P TKA 2+   Achilles    2+ 2+       MEDICAL DECISION MAKING    Medical records review: see under HPI section.     DATA    Labs:   Lab Results   Component Value Date/Time    SODIUM 140 07/20/2022 08:09 AM    POTASSIUM 4.1 07/20/2022 08:09 AM    CHLORIDE 104 07/20/2022 08:09 AM    CO2 22 07/20/2022 08:09 AM    ANION 14.0 07/20/2022 08:09 AM    GLUCOSE 101 (H) 07/20/2022 08:09 AM    BUN 19 07/20/2022 08:09 AM    CREATININE 0.88 07/20/2022 08:09 AM    CALCIUM 9.6 07/20/2022 08:09 AM    ASTSGOT 22 07/20/2022 08:09 AM    ALTSGPT 16 07/20/2022 08:09 AM    TBILIRUBIN 0.6 07/20/2022 08:09 AM    ALBUMIN 4.1 07/20/2022 08:09 AM    TOTPROTEIN 7.2 07/20/2022 08:09 AM    GLOBULIN 3.1 07/20/2022 08:09 AM    AGRATIO 1.3 07/20/2022 08:09 AM       No results found for: PROTHROMBTM, INR     Lab Results   Component Value Date/Time    WBC 5.3 07/20/2022 08:09 AM    RBC 4.97 07/20/2022 08:09 AM    HEMOGLOBIN 16.2 (H) 07/20/2022 08:09 AM    HEMATOCRIT 49.2 (H) 07/20/2022 08:09 AM    MCV 99.0 (H) 07/20/2022 08:09 AM    MCH 32.6 07/20/2022 08:09 AM    MCHC 32.9 (L) 07/20/2022 08:09 AM    MPV 11.0 07/20/2022 08:09 AM    NEUTSPOLYS 49.50 07/20/2022 08:09 AM    LYMPHOCYTES 32.20 07/20/2022 08:09 AM    MONOCYTES 9.70 07/20/2022 08:09 AM    EOSINOPHILS 7.60 (H) 07/20/2022 08:09 AM    BASOPHILS 0.80 07/20/2022 08:09 AM        Lab Results   Component Value Date/Time    HBA1C 5.3 11/08/2021 09:42 AM        Imaging: I personally reviewed following images, these are my reads  None reviewed    IMAGING radiology reads. I reviewed the following radiology reads  None reviewed                                                                                                              Diagnosis   Visit Diagnoses     ICD-10-CM   1. Primary osteoarthritis of left knee  M17.12   2. Chronic pain of left knee  M25.562    G89.29   3. Body mass index (BMI) 35.0-35.9, adult  Z68.35   4. Morbid (severe) obesity due to excess calories (HCC)  E66.01           ASSESSMENT:  Madiha Garrett  72 y.o. female seen for above     Madiha Palmer was seen today for new patient.    Diagnoses and all orders for this visit:    Primary osteoarthritis of left knee    Chronic pain of left knee  -     DX-KNEE 3 VIEWS LEFT; Future    Body mass index (BMI) 35.0-35.9, adult    Morbid (severe) obesity due to excess calories (HCC)      Discussed her history of previously diagnosed left knee osteoarthritis.  She has done well after right total knee replacement.  We discussed the range of treatment options including medications, physical therapy, injections and ultimately knee replacement.  Reviewed her home exercise program which is 3 times a week alternating 30 minutes of bike riding and 25 minutes on the rower.  I encouraged her to increase this to 4 times a week.  Encouraged her to follow-up with PCP and Endocrinologist regarding guidance on weight loss as it relates to her particular medical issues.    We reviewed that she has been taking Tylenol and from most recent complete metabolic panel discussed that she could increase this to 1000 mg 2 or possibly 3 times a day.  She is unlikely to do this.  We also discussed trial of diclofenac(voltaren gel) up to 4 g on the left knee up to every 6 hours.  Discussed possible left knee steroid injection and other injections that could be considered if this is not successful as well as possible referral to orthopedic surgery.  She declines orthopedic surgery for now and would like to try left knee steroid injection.  Plan to perform this injection with ultrasound guidance.  The risks benefits and alternatives to this procedure were discussed and the patient wishes to proceed with the procedure. Risks include but are not limited to damage to surrounding structures, infection, bleeding, worsening of pain which can be permanent, weakness which can be permanent. Benefits include pain relief, improved function. Alternatives includes not doing the procedure.    I have requested her to get  x-rays of the left knee and then we will plan for trial of left knee injection.    Outside records requested:  The patient signed outside records request form for her outside records including outside images.  Scanning discs from [facility]      Follow-up: Return if symptoms worsen or fail to improve, for Office injection.    My total time spent caring for the patient on the day of the encounter was 34 minutes.   This does not include time spent on separately billable procedures/tests.    Thank you very much for asking me to participate in Madiha Garrett's care.  Please contact me with any questions or concerns.    Please note that this dictation was created using voice recognition software. I have made every reasonable attempt to correct obvious errors but there may be errors of grammar and content that I may have overlooked prior to finalization of this note.      Kwan Fleming MD  Physical Medicine and Rehabilitation  Interventional Spine and Sports Physiatry  [facility]            Ledy Sweet MD, Maryse James MD

## 2022-09-28 DIAGNOSIS — M25.562 CHRONIC PAIN OF LEFT KNEE: ICD-10-CM

## 2022-09-28 DIAGNOSIS — M17.12 PRIMARY OSTEOARTHRITIS OF LEFT KNEE: ICD-10-CM

## 2022-09-28 DIAGNOSIS — G89.29 CHRONIC PAIN OF LEFT KNEE: ICD-10-CM

## 2022-09-29 ENCOUNTER — HOSPITAL ENCOUNTER (OUTPATIENT)
Dept: RADIOLOGY | Facility: MEDICAL CENTER | Age: 72
End: 2022-09-29
Payer: MEDICARE

## 2022-10-04 ENCOUNTER — OFFICE VISIT (OUTPATIENT)
Dept: PHYSICAL MEDICINE AND REHAB | Facility: MEDICAL CENTER | Age: 72
End: 2022-10-04
Payer: MEDICARE

## 2022-10-04 VITALS
OXYGEN SATURATION: 93 % | HEART RATE: 89 BPM | BODY MASS INDEX: 34.82 KG/M2 | TEMPERATURE: 98.1 F | DIASTOLIC BLOOD PRESSURE: 84 MMHG | SYSTOLIC BLOOD PRESSURE: 134 MMHG | WEIGHT: 216.7 LBS | HEIGHT: 66 IN

## 2022-10-04 DIAGNOSIS — G89.29 CHRONIC PAIN OF LEFT KNEE: ICD-10-CM

## 2022-10-04 DIAGNOSIS — M17.12 PRIMARY OSTEOARTHRITIS OF LEFT KNEE: ICD-10-CM

## 2022-10-04 DIAGNOSIS — M25.562 CHRONIC PAIN OF LEFT KNEE: ICD-10-CM

## 2022-10-04 PROCEDURE — 20611 DRAIN/INJ JOINT/BURSA W/US: CPT | Mod: LT | Performed by: PHYSICAL MEDICINE & REHABILITATION

## 2022-10-04 RX ORDER — DEXAMETHASONE SODIUM PHOSPHATE 4 MG/ML
4 INJECTION, SOLUTION INTRA-ARTICULAR; INTRALESIONAL; INTRAMUSCULAR; INTRAVENOUS; SOFT TISSUE ONCE
Status: COMPLETED | OUTPATIENT
Start: 2022-10-04 | End: 2022-10-04

## 2022-10-04 RX ADMIN — DEXAMETHASONE SODIUM PHOSPHATE 4 MG: 4 INJECTION, SOLUTION INTRA-ARTICULAR; INTRALESIONAL; INTRAMUSCULAR; INTRAVENOUS; SOFT TISSUE at 10:45

## 2022-10-04 ASSESSMENT — PATIENT HEALTH QUESTIONNAIRE - PHQ9: CLINICAL INTERPRETATION OF PHQ2 SCORE: 0

## 2022-10-04 ASSESSMENT — PAIN SCALES - GENERAL: PAINLEVEL: 4=SLIGHT-MODERATE PAIN

## 2022-10-04 ASSESSMENT — FIBROSIS 4 INDEX: FIB4 SCORE: 1.79

## 2022-10-04 NOTE — PROGRESS NOTES
Reviewed x-rays of the left knee showing severe medial compartment osteoarthritis and discussed that she has had some relief with use of topical diclofenac gel.    Plan to continue with left knee intra-articular steroid injection as scheduled.  See procedure note for further details of today's visit

## 2022-10-04 NOTE — PROCEDURES
Date of Service: 10/4/2022    Physician/s: Kwan Fleming MD    Pre-operative Diagnosis: left knee osteoarthritis, chronic left knee pain    Post-operative Diagnosis: left knee osteoarthritis, chronic left knee pain    Procedure: left Knee injection ultrasound-guided    Description of procedure:    The risks, benefits, and alternatives of the procedure were reviewed and discussed with the patient.  Written informed consent was freely obtained. A pre-procedural time-out was conducted by the physician verifying patient’s identity, procedure to be performed, procedure site and side, and allergy verification. Appropriate equipment was determined to be in place for the procedure.     No sedation was used for this procedure.  Ultrasound guidance was used and images were saved.    In the office suite the patient was placed in a sitting position, and the knee was prepped and draped in the usual sterile fashion. The ultrasound probe was placed over the suprapatellar joint recess.  The joint was encouraged from a medial approach.  The target for injection was marked, and a 22g 3.5 inch needle was placed into skin and advanced under ultrasound guidance into the joint space. Following negative aspiration, approx 5mL of 1% lidocaine with 1cc of 4mg/mL dexamethasone was then injected into the joint, and the needle was subsequently removed intact. The patient's knee was wiped with a 4x4 gauze, the area was cleansed with alcohol prep, and a bandaid was applied. There were no complications noted.     The the patient was discharged in good condition.    Kwan Fleming MD  Physical Medicine and Rehabilitation  Interventional Spine and Sports Physiatry  [facility]

## 2022-10-14 DIAGNOSIS — I10 ESSENTIAL HYPERTENSION: ICD-10-CM

## 2022-10-14 RX ORDER — LOSARTAN POTASSIUM 100 MG/1
TABLET ORAL
Qty: 90 TABLET | Refills: 3 | Status: SHIPPED | OUTPATIENT
Start: 2022-10-14 | End: 2023-10-06

## 2022-10-29 ENCOUNTER — HOSPITAL ENCOUNTER (OUTPATIENT)
Dept: LAB | Facility: MEDICAL CENTER | Age: 72
End: 2022-10-29
Payer: MEDICARE

## 2022-10-29 DIAGNOSIS — R73.9 ELEVATED SERUM GLUCOSE: ICD-10-CM

## 2022-10-29 DIAGNOSIS — E05.90 HYPERTHYROIDISM: ICD-10-CM

## 2022-10-29 LAB
ALBUMIN SERPL BCP-MCNC: 4.2 G/DL (ref 3.2–4.9)
ALBUMIN/GLOB SERPL: 1.4 G/DL
ALP SERPL-CCNC: 63 U/L (ref 30–99)
ALT SERPL-CCNC: 19 U/L (ref 2–50)
ANION GAP SERPL CALC-SCNC: 11 MMOL/L (ref 7–16)
AST SERPL-CCNC: 19 U/L (ref 12–45)
BILIRUB SERPL-MCNC: 0.7 MG/DL (ref 0.1–1.5)
BUN SERPL-MCNC: 15 MG/DL (ref 8–22)
CALCIUM SERPL-MCNC: 9.3 MG/DL (ref 8.5–10.5)
CHLORIDE SERPL-SCNC: 102 MMOL/L (ref 96–112)
CO2 SERPL-SCNC: 24 MMOL/L (ref 20–33)
CREAT SERPL-MCNC: 0.89 MG/DL (ref 0.5–1.4)
EST. AVERAGE GLUCOSE BLD GHB EST-MCNC: 117 MG/DL
GFR SERPLBLD CREATININE-BSD FMLA CKD-EPI: 69 ML/MIN/1.73 M 2
GLOBULIN SER CALC-MCNC: 3.1 G/DL (ref 1.9–3.5)
GLUCOSE SERPL-MCNC: 98 MG/DL (ref 65–99)
HBA1C MFR BLD: 5.7 % (ref 4–5.6)
POTASSIUM SERPL-SCNC: 4.1 MMOL/L (ref 3.6–5.5)
PROT SERPL-MCNC: 7.3 G/DL (ref 6–8.2)
SODIUM SERPL-SCNC: 137 MMOL/L (ref 135–145)
T3FREE SERPL-MCNC: 2.52 PG/ML (ref 2–4.4)
T4 FREE SERPL-MCNC: 1.16 NG/DL (ref 0.93–1.7)
TSH SERPL DL<=0.005 MIU/L-ACNC: 1 UIU/ML (ref 0.38–5.33)

## 2022-10-29 PROCEDURE — 36415 COLL VENOUS BLD VENIPUNCTURE: CPT

## 2022-10-29 PROCEDURE — 83036 HEMOGLOBIN GLYCOSYLATED A1C: CPT | Mod: GA

## 2022-10-29 PROCEDURE — 84681 ASSAY OF C-PEPTIDE: CPT

## 2022-10-29 PROCEDURE — 84443 ASSAY THYROID STIM HORMONE: CPT

## 2022-10-29 PROCEDURE — 84481 FREE ASSAY (FT-3): CPT

## 2022-10-29 PROCEDURE — 80053 COMPREHEN METABOLIC PANEL: CPT

## 2022-10-29 PROCEDURE — 84439 ASSAY OF FREE THYROXINE: CPT

## 2022-10-29 PROCEDURE — 86341 ISLET CELL ANTIBODY: CPT | Mod: 91

## 2022-11-01 ENCOUNTER — APPOINTMENT (OUTPATIENT)
Dept: PHYSICAL MEDICINE AND REHAB | Facility: MEDICAL CENTER | Age: 72
End: 2022-11-01
Payer: MEDICARE

## 2022-11-01 LAB
C PEPTIDE SERPL-MCNC: 3.8 NG/ML (ref 0.5–3.3)
ISLET CELL512 AB SER IA-ACNC: <5.4 U/ML (ref 0–7.4)

## 2022-11-02 LAB — GAD65 AB SER IA-ACNC: <5 IU/ML (ref 0–5)

## 2022-11-09 ENCOUNTER — PATIENT MESSAGE (OUTPATIENT)
Dept: HEALTH INFORMATION MANAGEMENT | Facility: OTHER | Age: 72
End: 2022-11-09

## 2022-11-16 ENCOUNTER — OFFICE VISIT (OUTPATIENT)
Dept: PHYSICAL MEDICINE AND REHAB | Facility: MEDICAL CENTER | Age: 72
End: 2022-11-16
Payer: MEDICARE

## 2022-11-16 VITALS
HEIGHT: 66 IN | DIASTOLIC BLOOD PRESSURE: 68 MMHG | OXYGEN SATURATION: 94 % | HEART RATE: 96 BPM | WEIGHT: 226.6 LBS | BODY MASS INDEX: 36.42 KG/M2 | SYSTOLIC BLOOD PRESSURE: 128 MMHG | TEMPERATURE: 97.4 F

## 2022-11-16 DIAGNOSIS — M17.12 PRIMARY OSTEOARTHRITIS OF LEFT KNEE: ICD-10-CM

## 2022-11-16 DIAGNOSIS — M25.562 CHRONIC PAIN OF LEFT KNEE: ICD-10-CM

## 2022-11-16 DIAGNOSIS — G89.29 CHRONIC PAIN OF LEFT KNEE: ICD-10-CM

## 2022-11-16 PROCEDURE — 99213 OFFICE O/P EST LOW 20 MIN: CPT | Performed by: PHYSICAL MEDICINE & REHABILITATION

## 2022-11-16 ASSESSMENT — FIBROSIS 4 INDEX: FIB4 SCORE: 1.42

## 2022-11-16 ASSESSMENT — PAIN SCALES - GENERAL: PAINLEVEL: 1=MINIMAL PAIN

## 2022-11-16 ASSESSMENT — PATIENT HEALTH QUESTIONNAIRE - PHQ9: CLINICAL INTERPRETATION OF PHQ2 SCORE: 0

## 2022-11-16 NOTE — PROGRESS NOTES
Follow up patient note  Pain Medicine, Interventional spine and sports physiatry, Physical medicine rehabilitation      Chief complaint:   Chief Complaint   Patient presents with    Follow-Up     Knee pain          HISTORY    Please see new patient note by Dr Fleming,  for more details.     HPI  Patient identification: Madiha Garrett 72 y.o. female presents for follow-up of left knee pain    Interval history:   Less aching in her left knee.  Decreased crepitus.  Tolerating activity.  Doing well after injection.  Maintaining her exercise routine.  Some intermittent pain, but better.  Pain is 1/10 on the NRS.  70% better.  She does note some mild anterior knee pain that is burning, this is intermittent and she reports that this is tolerable.    She does occasionally use voltaren gel 4g once a day. This does help.  She is not taking oral anti-inflammatories.    She is maintaining an exercise routine with aerobic activity for 1 hour 3 times a week.  Her activities also include weightlifting.       ROS Red Flags :    Fever, Chills, Sweats: Denies  Involuntary Weight Loss: Denies  Bowel/Bladder Incontinence: Denies  Saddle Anesthesia: Denies    PMHx:   Past Medical History:   Diagnosis Date    Hyperlipidemia     Hypertension     Hyperthyroidism     Graves' disease / thyrotropin receptor antibody = 6.2       PSHx:   Past Surgical History:   Procedure Laterality Date    KNEE REPLACEMENT, TOTAL Right 09/27/2018    Approximate date in 2018 [facility]    HYSTEROSCOPY WITH VIDEO DIAGNOSTIC         Family history   Family History   Problem Relation Age of Onset    Arthritis Mother     Heart Disease Father         arteriosclerosis    Alcohol/Drug Sister     Alcohol/Drug Brother        Medications:   Current Outpatient Medications   Medication    losartan (COZAAR) 100 MG Tab    diclofenac sodium (VOLTAREN) 1 % Gel    acetaminophen (TYLENOL) 500 MG Tab    NON SPECIFIED    methimazole (TAPAZOLE) 5 MG Tab     "Calcium Citrate (CITRACAL PO)    DIGESTIVE ENZYMES PO    ascorbic acid (ASCORBIC ACID) 500 MG Tab    vitamin D (CHOLECALCIFEROL) 1000 UNIT Tab     No current facility-administered medications for this visit.       Allergies:   No Known Allergies    Social Hx:   Social History     Socioeconomic History    Marital status:      Spouse name: Not on file    Number of children: Not on file    Years of education: Not on file    Highest education level: Not on file   Occupational History    Not on file   Tobacco Use    Smoking status: Former     Packs/day: 0.50     Years: 20.00     Pack years: 10.00     Types: Cigarettes     Quit date: 3/1/1997     Years since quittin.7    Smokeless tobacco: Never   Vaping Use    Vaping Use: Never used   Substance and Sexual Activity    Alcohol use: Yes     Alcohol/week: 1.2 oz     Types: 2 Glasses of wine per week    Drug use: No    Sexual activity: Yes     Partners: Male   Other Topics Concern    Not on file   Social History Narrative    Not on file     Social Determinants of Health     Financial Resource Strain: Not on file   Food Insecurity: Not on file   Transportation Needs: Not on file   Physical Activity: Not on file   Stress: Not on file   Social Connections: Not on file   Intimate Partner Violence: Not on file   Housing Stability: Not on file         EXAMINATION     Physical Exam:   Vitals: /68 (BP Location: Right arm, Patient Position: Sitting, BP Cuff Size: Adult long)   Pulse 96   Temp 36.3 °C (97.4 °F) (Temporal)   Ht 1.676 m (5' 6\")   Wt 103 kg (226 lb 9.6 oz)   SpO2 94%     Constitutional:   Body Habitus: Body mass index is 36.57 kg/m².  Cooperation: Fully cooperates with exam  Appearance: Well-groomed no disheveled      Respiratory-  breathing comfortable on room air, no audible wheezing  Cardiovascular- capillary refills less than 2 seconds. No lower extremity edema is noted.   Psychiatric- alert and oriented ×3. Normal affect.    Muscloskeletal - "   no warmth or erythema was noted at the injection site left knee  Mild joint line tenderness          MEDICAL DECISION MAKING    DATA    Labs:   Lab Results   Component Value Date/Time    SODIUM 137 10/29/2022 07:37 AM    POTASSIUM 4.1 10/29/2022 07:37 AM    CHLORIDE 102 10/29/2022 07:37 AM    CO2 24 10/29/2022 07:37 AM    GLUCOSE 98 10/29/2022 07:37 AM    BUN 15 10/29/2022 07:37 AM    CREATININE 0.89 10/29/2022 07:37 AM        No results found for: PROTHROMBTM, INR     Lab Results   Component Value Date/Time    WBC 5.3 07/20/2022 08:09 AM    RBC 4.97 07/20/2022 08:09 AM    HEMOGLOBIN 16.2 (H) 07/20/2022 08:09 AM    HEMATOCRIT 49.2 (H) 07/20/2022 08:09 AM    MCV 99.0 (H) 07/20/2022 08:09 AM    MCH 32.6 07/20/2022 08:09 AM    MCHC 32.9 (L) 07/20/2022 08:09 AM    MPV 11.0 07/20/2022 08:09 AM    NEUTSPOLYS 49.50 07/20/2022 08:09 AM    LYMPHOCYTES 32.20 07/20/2022 08:09 AM    MONOCYTES 9.70 07/20/2022 08:09 AM    EOSINOPHILS 7.60 (H) 07/20/2022 08:09 AM    BASOPHILS 0.80 07/20/2022 08:09 AM        Lab Results   Component Value Date/Time    HBA1C 5.7 (H) 10/29/2022 07:37 AM          Imaging: I personally reviewed following images  No new imaging reviewed    I reviewed the following radiology reports        Xray left knee 09/27/2022                                        IMPRESSION:     1.  There is severe medial compartment left knee joint osteoarthritis.                                                                    DIAGNOSIS   Visit Diagnoses     ICD-10-CM   1. Primary osteoarthritis of left knee  M17.12   2. Chronic pain of left knee  M25.562    G89.29         ASSESSMENT and PLAN:     Madiha Carrillo Tami 72 y.o. female with left knee osteoarthritis    Madiha Palmer was seen today for follow-up.    Diagnoses and all orders for this visit:    Primary osteoarthritis of left knee    Chronic pain of left knee    Discussed continuing home program.  Encouraged her to continue with activity as tolerated.  Encouraged both the  aerobic and weightlifting activities.  She is doing well after injection with decreased pain.  We discussed that injections could be repeated in the future if her pain returns.  If the anterior knee pain worsens she will contact the office.  Continue topical voltaren gel 1% prn.  This was refilled previously.  Discussed that she is also taking Tylenol usually 1000 mg once a day.    Follow up: prn, worsening of pain    Thank you for allowing me to participate in the care of this patient. If you have any questions please not hesitate to contact me.      Please note that this dictation was created using voice recognition software. I have made every reasonable attempt to correct obvious errors but there may be errors of grammar and content that I may have overlooked prior to finalization of this note.      Kwan Fleming MD  Interventional Spine and Sports Physiatry  Physical Medicine and Rehabilitation  [facility]  11/16/2022

## 2022-11-21 ENCOUNTER — OFFICE VISIT (OUTPATIENT)
Dept: ENDOCRINOLOGY | Facility: MEDICAL CENTER | Age: 72
End: 2022-11-21
Payer: MEDICARE

## 2022-11-21 VITALS
BODY MASS INDEX: 36.16 KG/M2 | HEIGHT: 66 IN | OXYGEN SATURATION: 90 % | HEART RATE: 76 BPM | WEIGHT: 225 LBS | SYSTOLIC BLOOD PRESSURE: 112 MMHG | DIASTOLIC BLOOD PRESSURE: 80 MMHG

## 2022-11-21 DIAGNOSIS — E66.9 OBESITY (BMI 35.0-39.9 WITHOUT COMORBIDITY): ICD-10-CM

## 2022-11-21 DIAGNOSIS — R73.03 PREDIABETES: ICD-10-CM

## 2022-11-21 DIAGNOSIS — E05.90 HYPERTHYROIDISM: ICD-10-CM

## 2022-11-21 DIAGNOSIS — E04.2 MULTIPLE THYROID NODULES: ICD-10-CM

## 2022-11-21 DIAGNOSIS — M85.80 OSTEOPENIA AFTER MENOPAUSE: ICD-10-CM

## 2022-11-21 DIAGNOSIS — E55.9 VITAMIN D DEFICIENCY: ICD-10-CM

## 2022-11-21 DIAGNOSIS — E05.00 GRAVES DISEASE: ICD-10-CM

## 2022-11-21 DIAGNOSIS — Z78.0 OSTEOPENIA AFTER MENOPAUSE: ICD-10-CM

## 2022-11-21 PROCEDURE — 99212 OFFICE O/P EST SF 10 MIN: CPT

## 2022-11-21 PROCEDURE — 99214 OFFICE O/P EST MOD 30 MIN: CPT

## 2022-11-21 RX ORDER — SEMAGLUTIDE 1.34 MG/ML
0.25 INJECTION, SOLUTION SUBCUTANEOUS
Qty: 4.5 ML | Refills: 11 | Status: SHIPPED
Start: 2022-11-21 | End: 2022-12-19

## 2022-11-21 ASSESSMENT — FIBROSIS 4 INDEX: FIB4 SCORE: 1.42

## 2022-11-21 NOTE — PROGRESS NOTES
Chief Complaint: Follow-up on the following conditions    HPI:   Jasiel Garrett is a 72 y.o. female    1.  Hyperthyroidism:  She was diagnosed in 2017    On Methimazole 2.5 mg 6 days a week    She denies palpitations, tremors, frequent diarrhea, body vibrations, constipation,  anxiety, weight loss    Denies taking any biotin or multivitamin     Latest Reference Range & Units 10/29/22 07:37   TSH 0.380 - 5.330 uIU/mL 1.000   Free T-4 0.93 - 1.70 ng/dL 1.16   T3,Free 2.00 - 4.40 pg/mL 2.52      Latest Reference Range & Units 07/20/22 08:09   TSH 0.380 - 5.330 uIU/mL 0.830   Free T-4 0.93 - 1.70 ng/dL 1.24   T3,Free 2.00 - 4.40 pg/mL 2.33     She denies eye pain, eye swelling, gritty eye feeling    2.  Graves' disease:  Elevated antibody levels   Latest Reference Range & Units 05/04/21 10:21   Thyrotropin Receptor Abs <=1.75 IU/L 8.50 (H) [1]     3.  Multiple thyroid nodules:  Thyroid ultrasound done on 6/11/2022 showed heterogeneous gland with normal vascularity  Nodule #1 located in the right middle thyroid lobe measuring 1.05 cm, mixed hypoechoic, with peripheral echogenic foci TR 4  Multiple subcentimeter nodules    FNA biopsy on 7/20/2022 of right middle thyroid nodule show a [place] category 2-benign  Next US June 2023    4. Osteopenia:  DEXA scan on 2/1/2021 showed a lumbar T score -0.6 and L femur T score -1.0  DEXA scan on 12/11/2019 showed a lumbar T score -0.8 and L femur T score -1.1  DEXA scan on 8/29/2018 showed a lumbar T score -1.3 and L femur T score -1.3  Next DEXA Feb 2023    Currently doing weight bearing exercises  Currently taking calcium 1200mg daily    5. Vitamin D deficiency:  Currently taking 2000IUs daily   Latest Reference Range & Units 07/20/22 08:09   25-Hydroxy   Vitamin D 25 30 - 100 ng/mL 36     6. Prediabetes:  Elevated A1c levels     Latest Reference Range & Units 10/29/22 07:37   Glycohemoglobin 4.0 - 5.6 % 5.7 (H)        Latest Reference Range & Units 10/29/22 07:37   C-Peptide 0.5 -  3.3 ng/mL 3.8 (H)   IA-2, Autoantibody 0.0 - 7.4 U/mL <5.4      Latest Reference Range & Units 10/29/22 07:37   YASIR Antibody 0.0 - 5.0 IU/mL <5.0      Latest Reference Range & Units 10/29/22 07:37   IA-2, Autoantibody 0.0 - 7.4 U/mL <5.4     7. Obesity (BMI at 36.32):  Unable to lose weight despite maintaining a healthy diet and exercising regularly      Patient's medications, allergies, and social histories were reviewed and updated as appropriate.      ROS:     CONS:     No fever, no chills, no weight loss, no fatigue   EYES:      No diplopia, no blurry vision, no redness of eyes, no swelling of eyelids   ENT:    No hearing loss, No ear pain, No sore throat, no dysphagia, no neck swelling   CV:     No chest pain, no palpitations, no claudication, no orthopnea, no PND   PULM:    No SOB, no cough, no hemoptysis, no wheezing    GI:   No nausea, no vomiting, no diarrhea, no constipation, no bloody stools   :  Passing urine well, no dysuria, no hematuria   ENDO:   No polyuria, no polydipsia, no heat intolerance, no cold intolerance   NEURO: No headaches, no dizziness, no convulsions, no tremors   MUSC:  No joint swellings, no arthralgias, no myalgias, no weakness   SKIN:   No rash, no ulcers, no dry skin   PSYCH:   No depression, no anxiety, no difficulty sleeping       Past Medical History:  Patient Active Problem List    Diagnosis Date Noted    Body mass index (BMI) 35.0-35.9, adult 09/27/2022    Vitamin D deficiency 10/27/2020    Post-menopause 10/24/2019    History of colonic polyps 10/22/2019    Osteopenia of multiple sites 10/12/2019    Prediabetes 07/31/2018    Graves disease 04/05/2018    Hyperthyroidism 07/14/2017    Mixed hyperlipidemia 03/01/2017    Essential hypertension 03/01/2017    Primary osteoarthritis of both knees 03/01/2017    Morbid (severe) obesity due to excess calories (HCC)        Past Surgical History:  Past Surgical History:   Procedure Laterality Date    KNEE REPLACEMENT, TOTAL Right  2018    Approximate date in 2018 [facility]    HYSTEROSCOPY WITH VIDEO DIAGNOSTIC          Allergies:  Patient has no known allergies.     Current Medications:    Current Outpatient Medications:     losartan (COZAAR) 100 MG Tab, TAKE ONE TABLET BY MOUTH EVERY DAY, Disp: 90 Tablet, Rfl: 3    acetaminophen (TYLENOL) 500 MG Tab, Take 2 Tablets by mouth 1 time a day as needed., Disp: , Rfl:     NON SPECIFIED, [place], Disp: , Rfl:     methimazole (TAPAZOLE) 5 MG Tab, Take 2.5mg 6 days/week, Disp: 90 Tablet, Rfl: 2    Calcium Citrate (CITRACAL PO), Take 1,200 Units by mouth., Disp: , Rfl:     DIGESTIVE ENZYMES PO, Take  by mouth., Disp: , Rfl:     ascorbic acid (ASCORBIC ACID) 500 MG Tab, Take 1 Tablet by mouth every day., Disp: , Rfl:     vitamin D (CHOLECALCIFEROL) 1000 UNIT Tab, Take 1 Tablet by mouth every day., Disp: , Rfl:     Social History:  Social History     Socioeconomic History    Marital status:      Spouse name: Not on file    Number of children: Not on file    Years of education: Not on file    Highest education level: Not on file   Occupational History    Not on file   Tobacco Use    Smoking status: Former     Packs/day: 0.50     Years: 20.00     Pack years: 10.00     Types: Cigarettes     Quit date: 3/1/1997     Years since quittin.7    Smokeless tobacco: Never   Vaping Use    Vaping Use: Never used   Substance and Sexual Activity    Alcohol use: Yes     Alcohol/week: 1.2 oz     Types: 2 Glasses of wine per week    Drug use: No    Sexual activity: Yes     Partners: Male   Other Topics Concern    Not on file   Social History Narrative    Not on file     Social Determinants of Health     Financial Resource Strain: Not on file   Food Insecurity: Not on file   Transportation Needs: Not on file   Physical Activity: Not on file   Stress: Not on file   Social Connections: Not on file   Intimate Partner Violence: Not on file   Housing Stability: Not on file     "    Family History:   Family History   Problem Relation Age of Onset    Arthritis Mother     Heart Disease Father         arteriosclerosis    Alcohol/Drug Sister     Alcohol/Drug Brother          PHYSICAL EXAM:   Vital signs: /80   Pulse 76   Ht 1.676 m (5' 6\")   Wt 102 kg (225 lb)   SpO2 90%   BMI 36.32 kg/m²   GENERAL: Well-developed, well-nourished  in no apparent distress.   EYE: No ocular and eyelid asymmetry, Anicteric sclerae,  PERRL, No exophthalmos or lidlag  HENT: Hearing grossly intact, Normocephalic, atraumatic.   NECK: Supple. Trachea midline. thyroid is small in size without nodules or tenderness  CARDIOVASCULAR: Regular rate and rhythm. No murmurs, rubs, or gallops.   LUNGS: Clear to auscultation bilaterally   EXTREMITIES: No clubbing, cyanosis  NEUROLOGICAL: Cranial nerves II-XII are grossly intact   Symmetric reflexes at the patella no proximal muscle weakness, No visible tremor with both outstretched hands  LYMPH: No cervical, supraclavicular,  adenopathy palpated.   SKIN: No rashes, lesions. Turgor is normal.    Labs:    Lab Results   Component Value Date/Time    TSHULTRASEN 0.230 (L) 05/11/2022 1015     Lab Results   Component Value Date/Time    FREET4 1.74 (H) 05/11/2022 1015     Lab Results   Component Value Date/Time    FREET3 3.38 05/11/2022 1015     Imaging:      ASSESSMENT/PLAN:   1. Hyperthyroidism  Clinically stable  Biochemically stable  We will continue same dose-see HPI  - TSH; Future  - FREE THYROXINE; Future  - Comp Metabolic Panel; Future  - T3 FREE; Future    2. Graves disease  This is etiology of her hyperthyroidism    3. Multiple thyroid nodules  Stable  We will repeat thyroid ultrasound around July 2023    4. Osteopenia after menopause  Stable  Continue daily calcium and vitamin D3 supplementation  Continue weightbearing exercises  Will repeat DEXA scan around February 2023    5. Vitamin D deficiency  Unstable      6. Prediabetes  Unstable  Lifestyle modifications " discussed  Ozempic 0.25 mg weekly added to her regimen, side effects discussed, and when to take medication discussed  - Semaglutide,0.25 or 0.5MG/DOS, (OZEMPIC, 0.25 OR 0.5 MG/DOSE,) 2 MG/1.5ML Solution Pen-injector; Inject 0.25 mg under the skin every 7 days. 1 pen per month  Dispense: 4.5 mL; Refill: 11     7.  Obesity:  Unstable  Treatment as per diagnoses #6    Disposition: Make an appointment to follow-up in 4 months  Do your blood work 1 to 2 weeks before your next appointment  No biotin 1 week prior to thyroid blood work    Medication Sample: Ozempic   Strength: 0.25/.5mg  Quantity: 1 box  Lot Number: [id number]   Date: 2025    Thank you kindly for allowing me to participate in the thyroid care plan for this patient.    Marco A Gracia A.P.R.NCesar  2022    CC:   Maryse James M.D.

## 2022-11-29 ENCOUNTER — TELEPHONE (OUTPATIENT)
Dept: ENDOCRINOLOGY | Facility: MEDICAL CENTER | Age: 72
End: 2022-11-29

## 2022-11-29 NOTE — TELEPHONE ENCOUNTER
Patients spouse called to check on the Ozempic. I looked into it and it looks like its being prescribed for weight loss. I went ahead and started the PA anyway's. I do not think it will be approved for weight loss she must have a type 2 diagnosis code.  CMM Key    (Key: [id number]) - [id number]

## 2022-12-05 ENCOUNTER — TELEPHONE (OUTPATIENT)
Dept: ENDOCRINOLOGY | Facility: MEDICAL CENTER | Age: 72
End: 2022-12-05
Payer: MEDICARE

## 2022-12-05 NOTE — TELEPHONE ENCOUNTER
Prior Authorization for Ozempic 0.25mg (Quantity: 6ml, Days: 84days) has been submitted via      Will follow up in 24-48 business hours.     Jacuqe Farias CPhT   Pharmacy Liaison  [phone]  12/5/2022 11:30 AM

## 2022-12-19 DIAGNOSIS — E66.9 OBESITY (BMI 35.0-39.9 WITHOUT COMORBIDITY): ICD-10-CM

## 2022-12-19 DIAGNOSIS — R73.03 PREDIABETES: ICD-10-CM

## 2022-12-19 RX ORDER — SEMAGLUTIDE 1.34 MG/ML
1 INJECTION, SOLUTION SUBCUTANEOUS
Qty: 9 ML | Refills: 11 | Status: SHIPPED
Start: 2022-12-19 | End: 2023-04-20

## 2022-12-29 ENCOUNTER — TELEPHONE (OUTPATIENT)
Dept: MEDICAL GROUP | Facility: LAB | Age: 72
End: 2022-12-29
Payer: MEDICARE

## 2022-12-29 RX ORDER — METHIMAZOLE 5 MG/1
2.5 TABLET ORAL 3 TIMES DAILY
COMMUNITY
End: 2023-07-25

## 2022-12-29 NOTE — TELEPHONE ENCOUNTER
Left message for patient to call back regarding pre-visit planning. Please transfer call to [phone].

## 2022-12-29 NOTE — TELEPHONE ENCOUNTER
ANNUAL WELLNESS VISIT PRE-VISIT PLANNING    1.  Reviewed notes from the last office visit: Yes    2.  If any orders were ordered or intended to be done prior to visit (i.e. 6 mos follow-up), do we have results/consult notes or has patient scheduled?          Labs - Labs ordered, completed on 11/8/2021 and results are in chart.  Note: If patient appointment is for lab review and patient did not complete labs, check with provider if OK to reschedule patient until labs completed.         Imaging - Imaging ordered, completed and results are in chart.         Referrals - Referral ordered, patient was seen and consult notes are in chart. Care Teams updated  YES.    3.  Immunizations were updated in Epic using Reconcile Outside Information activity? Yes  4.  Patient is due for the following Health Maintenance Topics:   Health Maintenance Due   Topic Date Due    IMM DTaP/Tdap/Td Vaccine (1 - Tdap) Never done    Annual Wellness Visit  10/29/2022    COLORECTAL CANCER SCREENING  11/15/2022   5.  Reviewed/Updated the following with patient:          Preferred Pharmacy? Yes           Preferred Lab? Yes          Preferred Communication? Yes           Allergies? Yes           Medications? Yes, abstract   6.  Care Team Updated:          DME Company (gait device, O2, CPAP, etc.): N/A          Other Specialists (eye doctor, derm, GYN, cardiology, endo, etc): Yes     7.  Patient was advised: “This is a free wellness visit. The provider will screen for medical conditions to help you stay healthy. If you have other concerns to address you may be asked to discuss these at a separate visit or there may be an additional fee.”     8.  AHA (Puls8) form printed for Provider? N/A

## 2023-01-28 SDOH — ECONOMIC STABILITY: INCOME INSECURITY: HOW HARD IS IT FOR YOU TO PAY FOR THE VERY BASICS LIKE FOOD, HOUSING, MEDICAL CARE, AND HEATING?: NOT HARD AT ALL

## 2023-01-28 SDOH — ECONOMIC STABILITY: HOUSING INSECURITY: IN THE LAST 12 MONTHS, HOW MANY PLACES HAVE YOU LIVED?: 1

## 2023-01-28 SDOH — ECONOMIC STABILITY: TRANSPORTATION INSECURITY
IN THE PAST 12 MONTHS, HAS LACK OF TRANSPORTATION KEPT YOU FROM MEETINGS, WORK, OR FROM GETTING THINGS NEEDED FOR DAILY LIVING?: NO

## 2023-01-28 SDOH — ECONOMIC STABILITY: HOUSING INSECURITY
IN THE LAST 12 MONTHS, WAS THERE A TIME WHEN YOU DID NOT HAVE A STEADY PLACE TO SLEEP OR SLEPT IN A SHELTER (INCLUDING NOW)?: NO

## 2023-01-28 SDOH — HEALTH STABILITY: PHYSICAL HEALTH: ON AVERAGE, HOW MANY MINUTES DO YOU ENGAGE IN EXERCISE AT THIS LEVEL?: 40 MIN

## 2023-01-28 SDOH — HEALTH STABILITY: MENTAL HEALTH
STRESS IS WHEN SOMEONE FEELS TENSE, NERVOUS, ANXIOUS, OR CAN'T SLEEP AT NIGHT BECAUSE THEIR MIND IS TROUBLED. HOW STRESSED ARE YOU?: NOT AT ALL

## 2023-01-28 SDOH — ECONOMIC STABILITY: TRANSPORTATION INSECURITY
IN THE PAST 12 MONTHS, HAS LACK OF RELIABLE TRANSPORTATION KEPT YOU FROM MEDICAL APPOINTMENTS, MEETINGS, WORK OR FROM GETTING THINGS NEEDED FOR DAILY LIVING?: NO

## 2023-01-28 SDOH — HEALTH STABILITY: PHYSICAL HEALTH: ON AVERAGE, HOW MANY DAYS PER WEEK DO YOU ENGAGE IN MODERATE TO STRENUOUS EXERCISE (LIKE A BRISK WALK)?: 3 DAYS

## 2023-01-28 SDOH — ECONOMIC STABILITY: FOOD INSECURITY: WITHIN THE PAST 12 MONTHS, THE FOOD YOU BOUGHT JUST DIDN'T LAST AND YOU DIDN'T HAVE MONEY TO GET MORE.: NEVER TRUE

## 2023-01-28 SDOH — ECONOMIC STABILITY: INCOME INSECURITY: IN THE LAST 12 MONTHS, WAS THERE A TIME WHEN YOU WERE NOT ABLE TO PAY THE MORTGAGE OR RENT ON TIME?: NO

## 2023-01-28 SDOH — ECONOMIC STABILITY: FOOD INSECURITY: WITHIN THE PAST 12 MONTHS, YOU WORRIED THAT YOUR FOOD WOULD RUN OUT BEFORE YOU GOT MONEY TO BUY MORE.: NEVER TRUE

## 2023-01-28 SDOH — ECONOMIC STABILITY: TRANSPORTATION INSECURITY
IN THE PAST 12 MONTHS, HAS THE LACK OF TRANSPORTATION KEPT YOU FROM MEDICAL APPOINTMENTS OR FROM GETTING MEDICATIONS?: NO

## 2023-01-28 ASSESSMENT — LIFESTYLE VARIABLES
HOW OFTEN DO YOU HAVE A DRINK CONTAINING ALCOHOL: 2-4 TIMES A MONTH
SKIP TO QUESTIONS 9-10: 1
AUDIT-C TOTAL SCORE: 2
HOW OFTEN DO YOU HAVE SIX OR MORE DRINKS ON ONE OCCASION: NEVER
HOW MANY STANDARD DRINKS CONTAINING ALCOHOL DO YOU HAVE ON A TYPICAL DAY: 1 OR 2

## 2023-01-28 ASSESSMENT — SOCIAL DETERMINANTS OF HEALTH (SDOH)
HOW OFTEN DO YOU ATTEND CHURCH OR RELIGIOUS SERVICES?: NEVER
HOW OFTEN DO YOU GET TOGETHER WITH FRIENDS OR RELATIVES?: ONCE A WEEK
HOW OFTEN DO YOU ATTEND CHURCH OR RELIGIOUS SERVICES?: NEVER
IN A TYPICAL WEEK, HOW MANY TIMES DO YOU TALK ON THE PHONE WITH FAMILY, FRIENDS, OR NEIGHBORS?: ONCE A WEEK
HOW OFTEN DO YOU ATTENT MEETINGS OF THE CLUB OR ORGANIZATION YOU BELONG TO?: NEVER
HOW OFTEN DO YOU HAVE A DRINK CONTAINING ALCOHOL: 2-4 TIMES A MONTH
HOW OFTEN DO YOU GET TOGETHER WITH FRIENDS OR RELATIVES?: ONCE A WEEK
IN A TYPICAL WEEK, HOW MANY TIMES DO YOU TALK ON THE PHONE WITH FAMILY, FRIENDS, OR NEIGHBORS?: ONCE A WEEK
DO YOU BELONG TO ANY CLUBS OR ORGANIZATIONS SUCH AS CHURCH GROUPS UNIONS, FRATERNAL OR ATHLETIC GROUPS, OR SCHOOL GROUPS?: NO
HOW OFTEN DO YOU HAVE SIX OR MORE DRINKS ON ONE OCCASION: NEVER
WITHIN THE PAST 12 MONTHS, YOU WORRIED THAT YOUR FOOD WOULD RUN OUT BEFORE YOU GOT THE MONEY TO BUY MORE: NEVER TRUE
HOW HARD IS IT FOR YOU TO PAY FOR THE VERY BASICS LIKE FOOD, HOUSING, MEDICAL CARE, AND HEATING?: NOT HARD AT ALL
DO YOU BELONG TO ANY CLUBS OR ORGANIZATIONS SUCH AS CHURCH GROUPS UNIONS, FRATERNAL OR ATHLETIC GROUPS, OR SCHOOL GROUPS?: NO
HOW MANY DRINKS CONTAINING ALCOHOL DO YOU HAVE ON A TYPICAL DAY WHEN YOU ARE DRINKING: 1 OR 2
HOW OFTEN DO YOU ATTENT MEETINGS OF THE CLUB OR ORGANIZATION YOU BELONG TO?: NEVER

## 2023-01-31 ENCOUNTER — OFFICE VISIT (OUTPATIENT)
Dept: MEDICAL GROUP | Facility: LAB | Age: 73
End: 2023-01-31
Payer: MEDICARE

## 2023-01-31 VITALS
HEART RATE: 70 BPM | WEIGHT: 220 LBS | OXYGEN SATURATION: 96 % | BODY MASS INDEX: 35.36 KG/M2 | DIASTOLIC BLOOD PRESSURE: 82 MMHG | RESPIRATION RATE: 16 BRPM | SYSTOLIC BLOOD PRESSURE: 120 MMHG | TEMPERATURE: 97.8 F | HEIGHT: 66 IN

## 2023-01-31 DIAGNOSIS — Z00.00 ENCOUNTER FOR MEDICARE ANNUAL WELLNESS EXAM: ICD-10-CM

## 2023-01-31 DIAGNOSIS — K64.4 EXTERNAL HEMORRHOID: ICD-10-CM

## 2023-01-31 DIAGNOSIS — Z23 NEED FOR VACCINATION: ICD-10-CM

## 2023-01-31 DIAGNOSIS — E55.9 VITAMIN D DEFICIENCY: ICD-10-CM

## 2023-01-31 DIAGNOSIS — E78.2 MIXED HYPERLIPIDEMIA: ICD-10-CM

## 2023-01-31 DIAGNOSIS — Z12.11 COLON CANCER SCREENING: ICD-10-CM

## 2023-01-31 PROCEDURE — G0439 PPPS, SUBSEQ VISIT: HCPCS | Performed by: PHYSICIAN ASSISTANT

## 2023-01-31 ASSESSMENT — ACTIVITIES OF DAILY LIVING (ADL): BATHING_REQUIRES_ASSISTANCE: 0

## 2023-01-31 ASSESSMENT — ENCOUNTER SYMPTOMS: GENERAL WELL-BEING: GOOD

## 2023-01-31 ASSESSMENT — FIBROSIS 4 INDEX: FIB4 SCORE: 1.42

## 2023-01-31 ASSESSMENT — PATIENT HEALTH QUESTIONNAIRE - PHQ9: CLINICAL INTERPRETATION OF PHQ2 SCORE: 0

## 2023-01-31 NOTE — PROGRESS NOTES
Chief Complaint   Patient presents with    Annual Exam       HPI:  Jasiel is a 72 y.o. here for Medicare Annual Wellness Visit    Req referral for eval of hemorrhoid.  This been a chronic issue for the patient and she has had previous procedure to remove hemorrhoids in the past    Patient Active Problem List    Diagnosis Date Noted    Body mass index (BMI) 35.0-35.9, adult 09/27/2022    Vitamin D deficiency 10/27/2020    Post-menopause 10/24/2019    History of colonic polyps 10/22/2019    Osteopenia of multiple sites 10/12/2019    Prediabetes 07/31/2018    Graves disease 04/05/2018    Hyperthyroidism 07/14/2017    Mixed hyperlipidemia 03/01/2017    Essential hypertension 03/01/2017    Primary osteoarthritis of both knees 03/01/2017    Morbid (severe) obesity due to excess calories (HCC)        Current Outpatient Medications   Medication Sig Dispense Refill    methimazole (TAPAZOLE) 5 MG Tab Take 2.5 mg by mouth 3 times a day.      Semaglutide, 1 MG/DOSE, (OZEMPIC, 1 MG/DOSE,) 4 MG/3ML Solution Pen-injector Inject 1 mg under the skin every 7 days. 9 mL 11    losartan (COZAAR) 100 MG Tab TAKE ONE TABLET BY MOUTH EVERY DAY 90 Tablet 3    acetaminophen (TYLENOL) 500 MG Tab Take 2 Tablets by mouth 1 time a day as needed.      Calcium Citrate (CITRACAL PO) Take 1,200 Units by mouth.      DIGESTIVE ENZYMES PO Take  by mouth.      ascorbic acid (ASCORBIC ACID) 500 MG Tab Take 1 Tablet by mouth every day.      vitamin D (CHOLECALCIFEROL) 1000 UNIT Tab Take 1 Tablet by mouth every day.       No current facility-administered medications for this visit.        Patient is taking medications as noted in medication list.  Current supplements as per medication list.     Allergies: Patient has no known allergies.    Current social contact/activities: Yes     Is patient current with immunizations? No, due for TDAP. Patient is interested in receiving TDAP today.    She  reports that she quit smoking about 25 years ago. Her smoking  use included cigarettes. She has a 10.00 pack-year smoking history. She has never used smokeless tobacco. She reports current alcohol use of about 1.2 oz per week. She reports that she does not use drugs.  Counseling given: Not Answered      ROS:    Gait: Uses no assistive device   Ostomy: No   Other tubes: No   Amputations: No   Chronic oxygen use No   Last eye exam 06/2021  Wears hearing aids: No   : Denies any urinary leakage during the last 6 months    Screening:  Due for colonoscopy  Depression Screening  Little interest or pleasure in doing things?  0 - not at all  Feeling down, depressed, or hopeless? 0 - not at all  Patient Health Questionnaire Score: 0    If depressive symptoms identified deferred to follow up visit unless specifically addressed in assessment and plan.    Interpretation of PHQ-9 Total Score   Score Severity   1-4 No Depression   5-9 Mild Depression   10-14 Moderate Depression   15-19 Moderately Severe Depression   20-27 Severe Depression    Screening for Cognitive Impairment  Three Minute Recall (daughter, heaven, mountain)  3/3    Thor clock face with all 12 numbers and set the hands to show 10 past 11.  Yes    If cognitive concerns identified, deferred for follow up unless specifically addressed in assessment and plan.    Fall Risk Assessment  Has the patient had two or more falls in the last year or any fall with injury in the last year?  No  If fall risk identified, deferred for follow up unless specifically addressed in assessment and plan.    Safety Assessment  Throw rugs on floor.  Yes  Handrails on all stairs.  Yes  Good lighting in all hallways.  Yes  Difficulty hearing.  No  Patient counseled about all safety risks that were identified.    Functional Assessment ADLs  Are there any barriers preventing you from cooking for yourself or meeting nutritional needs?  No.    Are there any barriers preventing you from driving safely or obtaining transportation?  No.    Are there any  barriers preventing you from using a telephone or calling for help?  No.    Are there any barriers preventing you from shopping?  No.    Are there any barriers preventing you from taking care of your own finances?  No.    Are there any barriers preventing you from managing your medications?  No.    Are there any barriers preventing you from showering, bathing or dressing yourself?  No.    Are you currently engaging in any exercise or physical activity?  Yes.     What is your perception of your health?  Good.    Advance Care Planning  Do you have an Advance Directive, Living Will, Durable Power of , or POLST? Yes  Advance Directive            Health Maintenance Summary            Overdue - IMM DTaP/Tdap/Td Vaccine (1 - Tdap) Overdue - never done      No completion history exists for this topic.              Ordered - COLORECTAL CANCER SCREENING (COLONOSCOPY - Every 3 Years) Ordered on 1/31/2023      11/15/2019  REFERRAL TO GI FOR COLONOSCOPY    02/20/2016  REFERRAL TO GI FOR COLONOSCOPY    02/17/2016  REFERRAL TO GI FOR COLONOSCOPY              Annual Wellness Visit (Every 366 Days) Next due on 2/1/2024 01/31/2023  Visit Dx: Encounter for Medicare annual wellness exam    01/31/2023  Level of Service: ANNUAL WELLNESS VISIT-INCLUDES PPPS SUBSEQUE*    10/28/2021  Level of Service: ANNUAL WELLNESS VISIT-INCLUDES PPPS SUBSEQUE*    10/28/2021  Visit Dx: Medicare annual wellness visit, subsequent    10/27/2020  Subsequent Annual Wellness Visit - Includes PPPS ()    Only the first 5 history entries have been loaded, but more history exists.              MAMMOGRAM (Every 2 Years) Next due on 2/3/2024      02/03/2022  MA-SCREENING MAMMO BILAT W/TOMOSYNTHESIS W/CAD    12/11/2019  MA-SCREENING MAMMO BILAT W/TOMOSYNTHESIS W/CAD    08/31/2018  MA-DIAGNOSTIC DIGITAL MAMMO-UNILAT LEFT    08/29/2018  MA-MAMMO SCREENING BILAT W/BRANT W/CAD    05/09/2017  MA-MAMMO SCREENING BILAT W/BRANT W/CAD    Only the first 5  history entries have been loaded, but more history exists.              BONE DENSITY (Every 5 Years) Next due on 2/1/2026 02/01/2021  DS-BONE DENSITY STUDY (DEXA)    12/11/2019  DS-BONE DENSITY STUDY (DEXA)    08/29/2018  DS-BONE DENSITY STUDY (DEXA)    08/31/2012  DS-BONE DENSITY STUDY (DEXA)              IMM PNEUMOCOCCAL VACCINE: 65+ Years (Series Information) Completed      10/22/2019  Imm Admin: Pneumococcal polysaccharide vaccine (PPSV-23)    07/26/2018  Imm Admin: Pneumococcal Conjugate Vaccine (Prevnar/PCV-13)              HEPATITIS C SCREENING  Completed      10/31/2019  HEP C VIRUS ANTIBODY              IMM ZOSTER VACCINES (Series Information) Completed      07/21/2020  Imm Admin: Zoster Vaccine Recombinant (RZV) (SHINGRIX)    03/09/2020  Imm Admin: Zoster Vaccine Recombinant (RZV) (SHINGRIX)              IMM INFLUENZA (Series Information) Completed      09/30/2022  Imm Admin: Influenza Vaccine Quad Inj (Pf)    09/30/2021  Imm Admin: Influenza Vaccine Quad Inj (Pf)    09/07/2020  Imm Admin: Influenza Vaccine Quad Inj (Pf)    10/10/2019  Imm Admin: Influenza, Unspecified - HISTORICAL DATA    10/10/2019  Imm Admin: Influenza Vaccine Quad Inj (Pf)    Only the first 5 history entries have been loaded, but more history exists.              COVID-19 Vaccine (Series Information) Completed      11/03/2022  Imm Admin: PFIZER BIVALENT BOOSTER SARS-COV-2 VACCINE (12+)    04/13/2022  Imm Admin: PFIZER MONTEZ CAP SARS-COV-2 VACCINATION (12+)    11/04/2021  Imm Admin: PFIZER PURPLE CAP SARS-COV-2 VACCINATION (12+)    04/02/2021  Imm Admin: PFIZER PURPLE CAP SARS-COV-2 VACCINATION (12+)    03/11/2021  Imm Admin: PFIZER PURPLE CAP SARS-COV-2 VACCINATION (12+)              IMM HEP B VACCINE (Series Information) Aged Out      No completion history exists for this topic.              IMM MENINGOCOCCAL ACWY VACCINE (Series Information) Aged Out      No completion history exists for this topic.                    Patient  "Care Team:  Maryse James M.D. as PCP - General (Family Medicine)  Kwan Fleming M.D. (Phys Med and Rehab)  BINU Crum (Nurse Practitioner Family)    Social History     Tobacco Use    Smoking status: Former     Packs/day: 0.50     Years: 20.00     Pack years: 10.00     Types: Cigarettes     Quit date: 3/1/1997     Years since quittin.9    Smokeless tobacco: Never   Vaping Use    Vaping Use: Never used   Substance Use Topics    Alcohol use: Yes     Alcohol/week: 1.2 oz     Types: 2 Glasses of wine per week    Drug use: No     Family History   Problem Relation Age of Onset    Arthritis Mother     Heart Disease Father         arteriosclerosis    Alcohol/Drug Sister     Alcohol/Drug Brother      She  has a past medical history of Hyperlipidemia, Hypertension, and Hyperthyroidism.    She has no past medical history of Encounter for long-term (current) use of other medications.   Past Surgical History:   Procedure Laterality Date    KNEE REPLACEMENT, TOTAL Right 2018    Approximate date in  [facility]    HYSTEROSCOPY WITH VIDEO DIAGNOSTIC         Exam:   /82   Pulse 70   Temp 36.6 °C (97.8 °F)   Resp 16   Ht 1.676 m (5' 6\")   Wt 99.8 kg (220 lb)   SpO2 96%  Body mass index is 35.51 kg/m².    Hearing excellent.    Dentition good  Alert, oriented in no acute distress.  Eye contact is good, speech goal directed, affect calm      Assessment and Plan. The following treatment and monitoring plan is recommended:    1. Encounter for Medicare annual wellness exam  Services suggested: No services needed at this time  Health Care Screening recommendations as per orders if indicated.  Referrals offered: PT/OT/Nutrition counseling/Behavioral Health/Smoking cessation as per orders if indicated.    Discussion today about general wellness and lifestyle habits:    Prevent falls and reduce trip hazards; Cautioned about securing or removing rugs.  Have a working fire " alarm and carbon monoxide detector;   Engage in regular physical activity and social activities     2. External hemorrhoid  Referral to specialist for further evaluation and management  - Referral to Gastroenterology    3. Colon cancer screening  - Referral to GI for Colonoscopy    4. Need for vaccination  Tdap not covered by Medicare, patient advised to complete vaccine at local pharmacy    5. Mixed hyperlipidemia  Chronic condition, stable  Due for recheck of lipid panel  - CBC WITH DIFFERENTIAL; Future  - Comp Metabolic Panel; Future  - Lipid Profile; Future    6. Vitamin D deficiency  Chronic condition, stable due for recheck  - VITAMIN D,25 HYDROXY (DEFICIENCY); Future          Follow-up: Return in about 1 year (around 1/31/2024) for Medicare Annual Wellnesss.

## 2023-03-06 DIAGNOSIS — E66.9 OBESITY (BMI 35.0-39.9 WITHOUT COMORBIDITY): ICD-10-CM

## 2023-03-06 DIAGNOSIS — R73.03 PREDIABETES: ICD-10-CM

## 2023-03-06 RX ORDER — SEMAGLUTIDE 2.68 MG/ML
2 INJECTION, SOLUTION SUBCUTANEOUS
Qty: 3 ML | Refills: 6 | Status: SHIPPED | OUTPATIENT
Start: 2023-03-06 | End: 2023-03-09 | Stop reason: SDUPTHER

## 2023-03-09 DIAGNOSIS — R73.03 PREDIABETES: ICD-10-CM

## 2023-03-09 DIAGNOSIS — E66.9 OBESITY (BMI 35.0-39.9 WITHOUT COMORBIDITY): ICD-10-CM

## 2023-03-09 RX ORDER — SEMAGLUTIDE 2.68 MG/ML
2 INJECTION, SOLUTION SUBCUTANEOUS
Qty: 9 ML | Refills: 6 | Status: SHIPPED
Start: 2023-03-09 | End: 2023-07-27

## 2023-04-14 ENCOUNTER — HOSPITAL ENCOUNTER (OUTPATIENT)
Dept: LAB | Facility: MEDICAL CENTER | Age: 73
End: 2023-04-14
Payer: MEDICARE

## 2023-04-14 ENCOUNTER — HOSPITAL ENCOUNTER (OUTPATIENT)
Dept: LAB | Facility: MEDICAL CENTER | Age: 73
End: 2023-04-14
Attending: PHYSICIAN ASSISTANT
Payer: MEDICARE

## 2023-04-14 DIAGNOSIS — E78.2 MIXED HYPERLIPIDEMIA: ICD-10-CM

## 2023-04-14 DIAGNOSIS — E05.90 HYPERTHYROIDISM: ICD-10-CM

## 2023-04-14 DIAGNOSIS — E55.9 VITAMIN D DEFICIENCY: ICD-10-CM

## 2023-04-14 LAB
25(OH)D3 SERPL-MCNC: 43 NG/ML (ref 30–100)
ALBUMIN SERPL BCP-MCNC: 4.2 G/DL (ref 3.2–4.9)
ALBUMIN SERPL BCP-MCNC: 4.3 G/DL (ref 3.2–4.9)
ALBUMIN/GLOB SERPL: 1.2 G/DL
ALBUMIN/GLOB SERPL: 1.2 G/DL
ALP SERPL-CCNC: 73 U/L (ref 30–99)
ALP SERPL-CCNC: 74 U/L (ref 30–99)
ALT SERPL-CCNC: 26 U/L (ref 2–50)
ALT SERPL-CCNC: 26 U/L (ref 2–50)
ANION GAP SERPL CALC-SCNC: 12 MMOL/L (ref 7–16)
ANION GAP SERPL CALC-SCNC: 13 MMOL/L (ref 7–16)
AST SERPL-CCNC: 22 U/L (ref 12–45)
AST SERPL-CCNC: 25 U/L (ref 12–45)
BASOPHILS # BLD AUTO: 0.9 % (ref 0–1.8)
BASOPHILS # BLD: 0.05 K/UL (ref 0–0.12)
BILIRUB SERPL-MCNC: 0.6 MG/DL (ref 0.1–1.5)
BILIRUB SERPL-MCNC: 0.6 MG/DL (ref 0.1–1.5)
BUN SERPL-MCNC: 12 MG/DL (ref 8–22)
BUN SERPL-MCNC: 12 MG/DL (ref 8–22)
CALCIUM ALBUM COR SERPL-MCNC: 10.3 MG/DL (ref 8.5–10.5)
CALCIUM ALBUM COR SERPL-MCNC: 10.3 MG/DL (ref 8.5–10.5)
CALCIUM SERPL-MCNC: 10.5 MG/DL (ref 8.5–10.5)
CALCIUM SERPL-MCNC: 10.5 MG/DL (ref 8.5–10.5)
CHLORIDE SERPL-SCNC: 102 MMOL/L (ref 96–112)
CHLORIDE SERPL-SCNC: 102 MMOL/L (ref 96–112)
CHOLEST SERPL-MCNC: 216 MG/DL (ref 100–199)
CO2 SERPL-SCNC: 26 MMOL/L (ref 20–33)
CO2 SERPL-SCNC: 26 MMOL/L (ref 20–33)
CREAT SERPL-MCNC: 0.88 MG/DL (ref 0.5–1.4)
CREAT SERPL-MCNC: 0.9 MG/DL (ref 0.5–1.4)
EOSINOPHIL # BLD AUTO: 0.24 K/UL (ref 0–0.51)
EOSINOPHIL NFR BLD: 4.1 % (ref 0–6.9)
ERYTHROCYTE [DISTWIDTH] IN BLOOD BY AUTOMATED COUNT: 47 FL (ref 35.9–50)
FASTING STATUS PATIENT QL REPORTED: NORMAL
GFR SERPLBLD CREATININE-BSD FMLA CKD-EPI: 68 ML/MIN/1.73 M 2
GFR SERPLBLD CREATININE-BSD FMLA CKD-EPI: 70 ML/MIN/1.73 M 2
GLOBULIN SER CALC-MCNC: 3.6 G/DL (ref 1.9–3.5)
GLOBULIN SER CALC-MCNC: 3.6 G/DL (ref 1.9–3.5)
GLUCOSE SERPL-MCNC: 100 MG/DL (ref 65–99)
GLUCOSE SERPL-MCNC: 99 MG/DL (ref 65–99)
HCT VFR BLD AUTO: 51.7 % (ref 37–47)
HDLC SERPL-MCNC: 49 MG/DL
HGB BLD-MCNC: 17.1 G/DL (ref 12–16)
IMM GRANULOCYTES # BLD AUTO: 0.02 K/UL (ref 0–0.11)
IMM GRANULOCYTES NFR BLD AUTO: 0.3 % (ref 0–0.9)
LDLC SERPL CALC-MCNC: 149 MG/DL
LYMPHOCYTES # BLD AUTO: 1.66 K/UL (ref 1–4.8)
LYMPHOCYTES NFR BLD: 28.5 % (ref 22–41)
MCH RBC QN AUTO: 32.4 PG (ref 27–33)
MCHC RBC AUTO-ENTMCNC: 33.1 G/DL (ref 33.6–35)
MCV RBC AUTO: 98.1 FL (ref 81.4–97.8)
MONOCYTES # BLD AUTO: 0.45 K/UL (ref 0–0.85)
MONOCYTES NFR BLD AUTO: 7.7 % (ref 0–13.4)
NEUTROPHILS # BLD AUTO: 3.4 K/UL (ref 2–7.15)
NEUTROPHILS NFR BLD: 58.5 % (ref 44–72)
NRBC # BLD AUTO: 0 K/UL
NRBC BLD-RTO: 0 /100 WBC
PLATELET # BLD AUTO: 242 K/UL (ref 164–446)
PMV BLD AUTO: 10.9 FL (ref 9–12.9)
POTASSIUM SERPL-SCNC: 4.2 MMOL/L (ref 3.6–5.5)
POTASSIUM SERPL-SCNC: 4.2 MMOL/L (ref 3.6–5.5)
PROT SERPL-MCNC: 7.8 G/DL (ref 6–8.2)
PROT SERPL-MCNC: 7.9 G/DL (ref 6–8.2)
RBC # BLD AUTO: 5.27 M/UL (ref 4.2–5.4)
SODIUM SERPL-SCNC: 140 MMOL/L (ref 135–145)
SODIUM SERPL-SCNC: 141 MMOL/L (ref 135–145)
T3FREE SERPL-MCNC: 3.43 PG/ML (ref 2–4.4)
T4 FREE SERPL-MCNC: 1.39 NG/DL (ref 0.93–1.7)
TRIGL SERPL-MCNC: 90 MG/DL (ref 0–149)
TSH SERPL DL<=0.005 MIU/L-ACNC: 1.51 UIU/ML (ref 0.38–5.33)
WBC # BLD AUTO: 5.8 K/UL (ref 4.8–10.8)

## 2023-04-14 PROCEDURE — 84481 FREE ASSAY (FT-3): CPT

## 2023-04-14 PROCEDURE — 80053 COMPREHEN METABOLIC PANEL: CPT

## 2023-04-14 PROCEDURE — 84443 ASSAY THYROID STIM HORMONE: CPT

## 2023-04-14 PROCEDURE — 80053 COMPREHEN METABOLIC PANEL: CPT | Mod: 91

## 2023-04-14 PROCEDURE — 85025 COMPLETE CBC W/AUTO DIFF WBC: CPT

## 2023-04-14 PROCEDURE — 84439 ASSAY OF FREE THYROXINE: CPT

## 2023-04-14 PROCEDURE — 36415 COLL VENOUS BLD VENIPUNCTURE: CPT

## 2023-04-14 PROCEDURE — 82306 VITAMIN D 25 HYDROXY: CPT

## 2023-04-14 PROCEDURE — 80061 LIPID PANEL: CPT

## 2023-04-20 ENCOUNTER — OFFICE VISIT (OUTPATIENT)
Dept: ENDOCRINOLOGY | Facility: MEDICAL CENTER | Age: 73
End: 2023-04-20
Payer: MEDICARE

## 2023-04-20 VITALS
SYSTOLIC BLOOD PRESSURE: 140 MMHG | WEIGHT: 213 LBS | HEIGHT: 66 IN | DIASTOLIC BLOOD PRESSURE: 90 MMHG | HEART RATE: 91 BPM | BODY MASS INDEX: 34.23 KG/M2

## 2023-04-20 DIAGNOSIS — E55.9 VITAMIN D DEFICIENCY: ICD-10-CM

## 2023-04-20 DIAGNOSIS — E05.90 HYPERTHYROIDISM: ICD-10-CM

## 2023-04-20 DIAGNOSIS — M85.80 OSTEOPENIA, UNSPECIFIED LOCATION: ICD-10-CM

## 2023-04-20 DIAGNOSIS — E66.9 OBESITY (BMI 30.0-34.9): ICD-10-CM

## 2023-04-20 DIAGNOSIS — E05.00 GRAVES DISEASE: ICD-10-CM

## 2023-04-20 DIAGNOSIS — E04.2 MULTIPLE THYROID NODULES: ICD-10-CM

## 2023-04-20 DIAGNOSIS — R73.03 PREDIABETES: ICD-10-CM

## 2023-04-20 DIAGNOSIS — E78.5 DYSLIPIDEMIA: ICD-10-CM

## 2023-04-20 LAB
HBA1C MFR BLD: 5.2 % (ref ?–5.8)
POCT INT CON NEG: NEGATIVE
POCT INT CON POS: POSITIVE

## 2023-04-20 PROCEDURE — 99212 OFFICE O/P EST SF 10 MIN: CPT

## 2023-04-20 PROCEDURE — 99214 OFFICE O/P EST MOD 30 MIN: CPT

## 2023-04-20 PROCEDURE — 83036 HEMOGLOBIN GLYCOSYLATED A1C: CPT

## 2023-04-20 RX ORDER — METFORMIN HYDROCHLORIDE 500 MG/1
500 TABLET, EXTENDED RELEASE ORAL 2 TIMES DAILY
Qty: 180 TABLET | Refills: 4 | Status: SHIPPED
Start: 2023-04-20 | End: 2023-07-27

## 2023-04-20 RX ORDER — LINACLOTIDE 145 UG/1
CAPSULE, GELATIN COATED ORAL
COMMUNITY
Start: 2023-04-10

## 2023-04-20 ASSESSMENT — FIBROSIS 4 INDEX: FIB4 SCORE: 1.28

## 2023-04-20 NOTE — PROGRESS NOTES
Chief Complaint: Follow-up on the following conditions    HPI:   Jasiel Garrett is a 72 y.o. female    1.  Hyperthyroidism:  She was diagnosed in 2017    On Methimazole 2.5 mg 6 days a week    She denies palpitations, tremors, frequent diarrhea, body vibrations, constipation,  anxiety, weight loss  She reports internal tremors   Denies taking any biotin or multivitamin     Latest Reference Range & Units 04/14/23 08:17   TSH 0.380 - 5.330 uIU/mL 1.510   Free T-4 0.93 - 1.70 ng/dL 1.39      Latest Reference Range & Units 04/14/23 08:17   T3,Free 2.00 - 4.40 pg/mL 3.43     She denies eye pain, eye swelling, gritty eye feeling    2.  Graves' disease:  Elevated antibody levels   Latest Reference Range & Units 05/04/21 10:21   Thyrotropin Receptor Abs <=1.75 IU/L 8.50 (H) [1]     3.  Multiple thyroid nodules:  Thyroid ultrasound done on 6/11/2022 showed heterogeneous gland with normal vascularity  Nodule #1 located in the right middle thyroid lobe measuring 1.05 cm, mixed hypoechoic, with peripheral echogenic foci TR 4  Multiple subcentimeter nodules    FNA biopsy on 7/20/2022 of right middle thyroid nodule show a [place] category 2-benign      4. Osteopenia:  DEXA scan on 2/1/2021 showed a lumbar T score -0.6 and L femur T score -1.0  DEXA scan on 12/11/2019 showed a lumbar T score -0.8 and L femur T score -1.1  DEXA scan on 8/29/2018 showed a lumbar T score -1.3 and L femur T score -1.3  Dexa will order at next appt.     Currently doing weight bearing exercises-3x week   Currently taking calcium 1200mg daily-not doing it due to constipation     5. Vitamin D deficiency:  Currently taking 2000IUs daily   Latest Reference Range & Units 04/14/23 08:13   25-Hydroxy   Vitamin D 25 30 - 100 ng/mL 43     6. Prediabetes:  Elevated A1c levels    Medication regimen:  Ozempic 0.25 mg weekly-SE of constipation     POC a1c on 4/20/2023 at 5.2%      Latest Reference Range & Units 10/29/22 07:37   Glycohemoglobin 4.0 - 5.6 % 5.7 (H)         Latest Reference Range & Units 10/29/22 07:37   C-Peptide 0.5 - 3.3 ng/mL 3.8 (H)   IA-2, Autoantibody 0.0 - 7.4 U/mL <5.4      Latest Reference Range & Units 10/29/22 07:37   YASIR Antibody 0.0 - 5.0 IU/mL <5.0      Latest Reference Range & Units 10/29/22 07:37   IA-2, Autoantibody 0.0 - 7.4 U/mL <5.4      Latest Reference Range & Units 04/14/23 08:17   GFR (CKD-EPI) >60 mL/min/1.73 m 2 70     7. Obesity (BMI at 36.32):  Unable to lose weight despite maintaining a healthy diet and exercising regularly  BMI is 34.38    8.  Dyslipidemia:  Patient is currently not on a statin  She would like to to minimize cholesterol levels with diet and exercise   Latest Reference Range & Units 04/14/23 08:13   Cholesterol,Tot 100 - 199 mg/dL 216 (H)   Triglycerides 0 - 149 mg/dL 90   HDL >=40 mg/dL 49   LDL <100 mg/dL 149 (H)       Patient's medications, allergies, and social histories were reviewed and updated as appropriate.      ROS:     CONS:     No fever, no chills, no weight loss, no fatigue   EYES:      No diplopia, no blurry vision, no redness of eyes, no swelling of eyelids   ENT:    No hearing loss, No ear pain, No sore throat, no dysphagia, no neck swelling   CV:     No chest pain, no palpitations, no claudication, no orthopnea, no PND   PULM:    No SOB, no cough, no hemoptysis, no wheezing    GI:   No nausea, no vomiting, no diarrhea, no constipation, no bloody stools   :  Passing urine well, no dysuria, no hematuria   ENDO:   No polyuria, no polydipsia, no heat intolerance, no cold intolerance   NEURO: No headaches, no dizziness, no convulsions, no tremors   MUSC:  No joint swellings, no arthralgias, no myalgias, no weakness   SKIN:   No rash, no ulcers, no dry skin   PSYCH:   No depression, no anxiety, no difficulty sleeping       Past Medical History:  Patient Active Problem List    Diagnosis Date Noted    Body mass index (BMI) 35.0-35.9, adult 09/27/2022    Vitamin D deficiency 10/27/2020    Post-menopause  10/24/2019    History of colonic polyps 10/22/2019    Osteopenia of multiple sites 10/12/2019    Prediabetes 07/31/2018    Graves disease 04/05/2018    Hyperthyroidism 07/14/2017    Mixed hyperlipidemia 03/01/2017    Essential hypertension 03/01/2017    Primary osteoarthritis of both knees 03/01/2017    Morbid (severe) obesity due to excess calories (HCC)        Past Surgical History:  Past Surgical History:   Procedure Laterality Date    KNEE REPLACEMENT, TOTAL Right 09/27/2018    Approximate date in 2018 [facility]    HYSTEROSCOPY WITH VIDEO DIAGNOSTIC          Allergies:  Patient has no known allergies.     Current Medications:    Current Outpatient Medications:     Semaglutide, 2 MG/DOSE, (OZEMPIC, 2 MG/DOSE,) 8 MG/3ML Solution Pen-injector, Inject 2 mg under the skin every 7 days., Disp: 9 mL, Rfl: 6    methimazole (TAPAZOLE) 5 MG Tab, Take 2.5 mg by mouth 3 times a day., Disp: , Rfl:     Semaglutide, 1 MG/DOSE, (OZEMPIC, 1 MG/DOSE,) 4 MG/3ML Solution Pen-injector, Inject 1 mg under the skin every 7 days., Disp: 9 mL, Rfl: 11    losartan (COZAAR) 100 MG Tab, TAKE ONE TABLET BY MOUTH EVERY DAY, Disp: 90 Tablet, Rfl: 3    acetaminophen (TYLENOL) 500 MG Tab, Take 2 Tablets by mouth 1 time a day as needed., Disp: , Rfl:     Calcium Citrate (CITRACAL PO), Take 1,200 Units by mouth., Disp: , Rfl:     DIGESTIVE ENZYMES PO, Take  by mouth., Disp: , Rfl:     ascorbic acid (ASCORBIC ACID) 500 MG Tab, Take 1 Tablet by mouth every day., Disp: , Rfl:     vitamin D (CHOLECALCIFEROL) 1000 UNIT Tab, Take 1 Tablet by mouth every day., Disp: , Rfl:     Social History:  Social History     Socioeconomic History    Marital status:      Spouse name: Not on file    Number of children: Not on file    Years of education: Not on file    Highest education level: Bachelor's degree (e.g., BA, AB, BS)   Occupational History    Not on file   Tobacco Use    Smoking status: Former     Packs/day: 0.50     Years:  "20.00     Pack years: 10.00     Types: Cigarettes     Quit date: 3/1/1997     Years since quittin.1    Smokeless tobacco: Never   Vaping Use    Vaping Use: Never used   Substance and Sexual Activity    Alcohol use: Yes     Alcohol/week: 1.2 oz     Types: 2 Glasses of wine per week    Drug use: No    Sexual activity: Yes     Partners: Male   Other Topics Concern    Not on file   Social History Narrative    Not on file     Social Determinants of Health     Financial Resource Strain: Low Risk     Difficulty of Paying Living Expenses: Not hard at all   Food Insecurity: No Food Insecurity    Worried About Running Out of Food in the Last Year: Never true    Ran Out of Food in the Last Year: Never true   Transportation Needs: No Transportation Needs    Lack of Transportation (Medical): No    Lack of Transportation (Non-Medical): No   Physical Activity: Insufficiently Active    Days of Exercise per Week: 3 days    Minutes of Exercise per Session: 40 min   Stress: No Stress Concern Present    Feeling of Stress : Not at all   Social Connections: Socially Isolated    Frequency of Communication with Friends and Family: Once a week    Frequency of Social Gatherings with Friends and Family: Once a week    Attends Sikhism Services: Never    Active Member of Clubs or Organizations: No    Attends Club or Organization Meetings: Never    Marital Status:    Intimate Partner Violence: Not on file   Housing Stability: Low Risk     Unable to Pay for Housing in the Last Year: No    Number of Places Lived in the Last Year: 1    Unstable Housing in the Last Year: No        Family History:   Family History   Problem Relation Age of Onset    Arthritis Mother     Heart Disease Father         arteriosclerosis    Alcohol/Drug Sister     Alcohol/Drug Brother          PHYSICAL EXAM:   Vital signs: BP (!) 140/90 (BP Location: Left arm, Patient Position: Sitting, BP Cuff Size: Large adult)   Pulse 91   Ht 1.676 m (5' 6\")   Wt 96.6 kg " (213 lb)   BMI 34.38 kg/m²   GENERAL: Well-developed, well-nourished  in no apparent distress.   EYE: No ocular and eyelid asymmetry, Anicteric sclerae,  PERRL, No exophthalmos or lidlag  HENT: Hearing grossly intact, Normocephalic, atraumatic.   NECK: Supple. Trachea midline. thyroid is small in size without nodules or tenderness  CARDIOVASCULAR: Regular rate and rhythm. No murmurs, rubs, or gallops.   LUNGS: Clear to auscultation bilaterally   EXTREMITIES: No clubbing, cyanosis  NEUROLOGICAL: Cranial nerves II-XII are grossly intact   Symmetric reflexes at the patella no proximal muscle weakness, No visible tremor with both outstretched hands  LYMPH: No cervical, supraclavicular,  adenopathy palpated.   SKIN: No rashes, lesions. Turgor is normal.    Labs:    Lab Results   Component Value Date/Time    TSHULTRASEN 0.230 (L) 05/11/2022 1015     Lab Results   Component Value Date/Time    FREET4 1.74 (H) 05/11/2022 1015     Lab Results   Component Value Date/Time    FREET3 3.38 05/11/2022 1015     Imaging:      ASSESSMENT/PLAN:   1. Hyperthyroidism  Clinically stable-patient-reported internal tremors, we discussed that at this time based on her thyroid hormone levels this internal tremors might not be due to her thyroid and further evaluation by her PCP could be considered  Biochemically stable  Methimazole decreased by taking 5 mg 2 days a week and 2.5 mg 1 day a week to avoid iatrogenic hypothyroidism  - TSH; Future  - FREE THYROXINE; Future  - Comp Metabolic Panel; Future  - T3 FREE; Future    2. Graves disease  This is etiology of her hyperthyroidism    3. Multiple thyroid nodules  Stable  We will schedule thyroid ultrasound with Dr. Delong  - POC US THYROID/PARATHYROID/NECK; Future    4. Osteopenia, unspecified location  Stable  Patient tried and failed calcium with side effect of constipation-discussed to eat good amounts of green leafy vegetables  Discussed to continue weightbearing exercises  We will order  bone density scan at next appointment    5. Vitamin D deficiency  Stable  Continue regimen-HPI  - VITAMIN D,25 HYDROXY (DEFICIENCY); Future    6. Prediabetes  Stable with an A1c of 5.2%  Lifestyle modifications discussed  Patient tried and failed Ozempic with side effect of constipation  We will switch to metformin  mg twice a day  - POCT Hemoglobin A1C  - metFORMIN ER (GLUCOPHAGE XR) 500 MG TABLET SR 24 HR; Take 1 Tablet by mouth 2 times a day.  Dispense: 180 Tablet; Refill: 4  - MICROALBUMIN CREAT RATIO URINE; Future    7. Obesity (BMI 30.0-34.9)  Unstable  Treatment per diagnoses #6 with lifestyle modifications    8. Dyslipidemia  Unstable  Patient would like to attend diet and exercise at this time and evaluate at next appointment  - Lipid Profile; Future       Disposition: Make an appointment to follow-up in 3 months  Do your blood work 2 weeks before your next appointment  No biotin 1 week prior to thyroid blood work    Thank you kindly for allowing me to participate in the thyroid care plan for this patient.    HERMES Crum.P.R.N.  4/20/2023    CC:   Maryse James M.D.

## 2023-07-20 ENCOUNTER — HOSPITAL ENCOUNTER (OUTPATIENT)
Dept: LAB | Facility: MEDICAL CENTER | Age: 73
End: 2023-07-20
Payer: MEDICARE

## 2023-07-20 DIAGNOSIS — E78.5 DYSLIPIDEMIA: ICD-10-CM

## 2023-07-20 DIAGNOSIS — R73.03 PREDIABETES: ICD-10-CM

## 2023-07-20 DIAGNOSIS — E05.90 HYPERTHYROIDISM: ICD-10-CM

## 2023-07-20 DIAGNOSIS — E55.9 VITAMIN D DEFICIENCY: ICD-10-CM

## 2023-07-20 LAB
25(OH)D3 SERPL-MCNC: 44 NG/ML (ref 30–100)
ALBUMIN SERPL BCP-MCNC: 4.2 G/DL (ref 3.2–4.9)
ALBUMIN/GLOB SERPL: 1.4 G/DL
ALP SERPL-CCNC: 75 U/L (ref 30–99)
ALT SERPL-CCNC: 16 U/L (ref 2–50)
ANION GAP SERPL CALC-SCNC: 10 MMOL/L (ref 7–16)
AST SERPL-CCNC: 17 U/L (ref 12–45)
BILIRUB SERPL-MCNC: 0.6 MG/DL (ref 0.1–1.5)
BUN SERPL-MCNC: 17 MG/DL (ref 8–22)
CALCIUM ALBUM COR SERPL-MCNC: 9.5 MG/DL (ref 8.5–10.5)
CALCIUM SERPL-MCNC: 9.7 MG/DL (ref 8.5–10.5)
CHLORIDE SERPL-SCNC: 105 MMOL/L (ref 96–112)
CHOLEST SERPL-MCNC: 196 MG/DL (ref 100–199)
CO2 SERPL-SCNC: 25 MMOL/L (ref 20–33)
CREAT SERPL-MCNC: 0.99 MG/DL (ref 0.5–1.4)
CREAT UR-MCNC: 74.11 MG/DL
FASTING STATUS PATIENT QL REPORTED: NORMAL
GFR SERPLBLD CREATININE-BSD FMLA CKD-EPI: 60 ML/MIN/1.73 M 2
GLOBULIN SER CALC-MCNC: 3 G/DL (ref 1.9–3.5)
GLUCOSE SERPL-MCNC: 95 MG/DL (ref 65–99)
HDLC SERPL-MCNC: 44 MG/DL
LDLC SERPL CALC-MCNC: 127 MG/DL
MICROALBUMIN UR-MCNC: <1.2 MG/DL
MICROALBUMIN/CREAT UR: NORMAL MG/G (ref 0–30)
POTASSIUM SERPL-SCNC: 4.4 MMOL/L (ref 3.6–5.5)
PROT SERPL-MCNC: 7.2 G/DL (ref 6–8.2)
SODIUM SERPL-SCNC: 140 MMOL/L (ref 135–145)
T3FREE SERPL-MCNC: 2.98 PG/ML (ref 2–4.4)
T4 FREE SERPL-MCNC: 1.33 NG/DL (ref 0.93–1.7)
TRIGL SERPL-MCNC: 123 MG/DL (ref 0–149)
TSH SERPL DL<=0.005 MIU/L-ACNC: 1.01 UIU/ML (ref 0.38–5.33)

## 2023-07-20 PROCEDURE — 36415 COLL VENOUS BLD VENIPUNCTURE: CPT

## 2023-07-20 PROCEDURE — 82306 VITAMIN D 25 HYDROXY: CPT

## 2023-07-20 PROCEDURE — 82570 ASSAY OF URINE CREATININE: CPT

## 2023-07-20 PROCEDURE — 80061 LIPID PANEL: CPT

## 2023-07-20 PROCEDURE — 82043 UR ALBUMIN QUANTITATIVE: CPT

## 2023-07-20 PROCEDURE — 84443 ASSAY THYROID STIM HORMONE: CPT

## 2023-07-20 PROCEDURE — 80053 COMPREHEN METABOLIC PANEL: CPT

## 2023-07-20 PROCEDURE — 84439 ASSAY OF FREE THYROXINE: CPT

## 2023-07-20 PROCEDURE — 84481 FREE ASSAY (FT-3): CPT

## 2023-07-25 DIAGNOSIS — E05.00 GRAVES DISEASE: ICD-10-CM

## 2023-07-25 RX ORDER — METHIMAZOLE 5 MG/1
TABLET ORAL
Qty: 90 TABLET | Refills: 2 | Status: SHIPPED | OUTPATIENT
Start: 2023-07-25 | End: 2023-07-27 | Stop reason: SDUPTHER

## 2023-07-27 ENCOUNTER — OFFICE VISIT (OUTPATIENT)
Dept: ENDOCRINOLOGY | Facility: MEDICAL CENTER | Age: 73
End: 2023-07-27
Payer: MEDICARE

## 2023-07-27 VITALS
HEIGHT: 66 IN | DIASTOLIC BLOOD PRESSURE: 84 MMHG | BODY MASS INDEX: 34.01 KG/M2 | SYSTOLIC BLOOD PRESSURE: 138 MMHG | OXYGEN SATURATION: 91 % | HEART RATE: 95 BPM | WEIGHT: 211.6 LBS

## 2023-07-27 DIAGNOSIS — E05.00 GRAVES DISEASE: ICD-10-CM

## 2023-07-27 DIAGNOSIS — M85.80 OSTEOPENIA AFTER MENOPAUSE: ICD-10-CM

## 2023-07-27 DIAGNOSIS — R73.03 PREDIABETES: ICD-10-CM

## 2023-07-27 DIAGNOSIS — E55.9 VITAMIN D DEFICIENCY: ICD-10-CM

## 2023-07-27 DIAGNOSIS — E66.9 OBESITY (BMI 30.0-34.9): ICD-10-CM

## 2023-07-27 DIAGNOSIS — Z78.0 OSTEOPENIA AFTER MENOPAUSE: ICD-10-CM

## 2023-07-27 DIAGNOSIS — E04.2 MULTIPLE THYROID NODULES: ICD-10-CM

## 2023-07-27 DIAGNOSIS — E78.5 DYSLIPIDEMIA: ICD-10-CM

## 2023-07-27 DIAGNOSIS — E05.90 HYPERTHYROIDISM: ICD-10-CM

## 2023-07-27 PROCEDURE — 99211 OFF/OP EST MAY X REQ PHY/QHP: CPT

## 2023-07-27 PROCEDURE — 3079F DIAST BP 80-89 MM HG: CPT

## 2023-07-27 PROCEDURE — 99214 OFFICE O/P EST MOD 30 MIN: CPT

## 2023-07-27 PROCEDURE — 3075F SYST BP GE 130 - 139MM HG: CPT

## 2023-07-27 RX ORDER — METHIMAZOLE 5 MG/1
TABLET ORAL
Qty: 90 TABLET | Refills: 2 | Status: SHIPPED | OUTPATIENT
Start: 2023-07-27 | End: 2023-09-05 | Stop reason: SDUPTHER

## 2023-07-27 RX ORDER — METFORMIN HYDROCHLORIDE 500 MG/1
1000 TABLET, EXTENDED RELEASE ORAL DAILY
Qty: 90 TABLET | Refills: 1 | Status: SHIPPED | OUTPATIENT
Start: 2023-07-27 | End: 2023-10-02

## 2023-07-27 ASSESSMENT — FIBROSIS 4 INDEX: FIB4 SCORE: 1.26

## 2023-07-27 NOTE — PROGRESS NOTES
Chief Complaint: Follow-up on the following conditions    HPI:   Jasiel Garrett is a 73 y.o. female    1.  Hyperthyroidism:  She was diagnosed in 2017    On Methimazole 5 mg twice a day 2 days a week and 2.5 mg 1 day a week    She denies palpitations, tremors, frequent diarrhea, body vibrations, constipation,  anxiety, weight loss, internal termors   Denies taking any biotin or multivitamin     Latest Reference Range & Units 07/20/23 07:13   TSH 0.380 - 5.330 uIU/mL 1.010   Free T-4 0.93 - 1.70 ng/dL 1.33   T3,Free 2.00 - 4.40 pg/mL 2.98     She denies eye pain, eye swelling, gritty eye feeling    2.  Graves' disease:  Elevated antibody levels   Latest Reference Range & Units 05/04/21 10:21   Thyrotropin Receptor Abs <=1.75 IU/L 8.50 (H) [1]     3.  Multiple thyroid nodules:  Thyroid ultrasound done on 6/11/2022 showed heterogeneous gland with normal vascularity  Nodule #1 located in the right middle thyroid lobe measuring 1.05 cm, mixed hypoechoic, with peripheral echogenic foci TR 4  Multiple subcentimeter nodules    FNA biopsy on 7/20/2022 of right middle thyroid nodule show a [place] category 2-benign      4. Osteopenia:  DEXA scan on 2/1/2021 showed a lumbar T score -0.6 and L femur T score -1.0  DEXA scan on 12/11/2019 showed a lumbar T score -0.8 and L femur T score -1.1  DEXA scan on 8/29/2018 showed a lumbar T score -1.3 and L femur T score -1.3  Dexa will order at next appt.     Currently doing weight bearing exercises-3x week   Currently taking calcium 1200mg daily-not doing it due to constipation      Latest Reference Range & Units 07/20/23 07:13   GFR (CKD-EPI) >60 mL/min/1.73 m 2 60         5. Vitamin D deficiency:  Currently taking 2000IUs daily   Latest Reference Range & Units 07/20/23 07:13   25-Hydroxy   Vitamin D 25 30 - 100 ng/mL 44     6. Prediabetes:  Elevated A1c levels    Medication regimen:  Metformin 500 2x/day     Ozempic 0.25 mg weekly-SE of constipation     POC a1c on 4/20/2023 at 5.2%  "     Latest Reference Range & Units 10/29/22 07:37   Glycohemoglobin 4.0 - 5.6 % 5.7 (H)        Latest Reference Range & Units 10/29/22 07:37   C-Peptide 0.5 - 3.3 ng/mL 3.8 (H)   IA-2, Autoantibody 0.0 - 7.4 U/mL <5.4      Latest Reference Range & Units 10/29/22 07:37   YASIR Antibody 0.0 - 5.0 IU/mL <5.0      Latest Reference Range & Units 10/29/22 07:37   IA-2, Autoantibody 0.0 - 7.4 U/mL <5.4      Latest Reference Range & Units 04/14/23 08:17   GFR (CKD-EPI) >60 mL/min/1.73 m 2 70      Latest Reference Range & Units 07/20/23 07:13   Micro Alb Creat Ratio 0 - 30 mg/g see below   Creatinine, Urine mg/dL 74.11   Microalbumin, Urine Random mg/dL <1.2         7. Obesity (BMI at 36.32):  Unable to lose weight despite maintaining a healthy diet and exercising regularly  BMI is 34.38  4/20/2023   0936 7/27/2023   1047 Most Recent Value     Weight: 96.6 kg (213 lb) 96 kg (211 lb 9.6 oz) 96 kg (211 lb 9.6 oz)  as of 7/27/2023    Body Mass Index:   34.15 kg/m² Abnormal    1.676 m (5' 6\")  as of 7/27/2023   96 kg (211 lb 9.6 oz)  as of 7/27/2023      8.  Dyslipidemia:  Patient is currently not on a statin  She would like to to minimize cholesterol levels with diet and exercise   Latest Reference Range & Units 07/20/23 07:13   Cholesterol,Tot 100 - 199 mg/dL 196   Triglycerides 0 - 149 mg/dL 123   HDL >=40 mg/dL 44   LDL <100 mg/dL 127 (H)       Patient's medications, allergies, and social histories were reviewed and updated as appropriate.      ROS:     CONS:     No fever, no chills, no weight loss, no fatigue   EYES:      No diplopia, no blurry vision, no redness of eyes, no swelling of eyelids   ENT:    No hearing loss, No ear pain, No sore throat, no dysphagia, no neck swelling   CV:     No chest pain, no palpitations, no claudication, no orthopnea, no PND   PULM:    No SOB, no cough, no hemoptysis, no wheezing    GI:   No nausea, no vomiting, no diarrhea, no constipation, no bloody stools   :  Passing urine well, no " dysuria, no hematuria   ENDO:   No polyuria, no polydipsia, no heat intolerance, no cold intolerance   NEURO: No headaches, no dizziness, no convulsions, no tremors   MUSC:  No joint swellings, no arthralgias, no myalgias, no weakness   SKIN:   No rash, no ulcers, no dry skin   PSYCH:   No depression, no anxiety, no difficulty sleeping       Past Medical History:  Patient Active Problem List    Diagnosis Date Noted    Body mass index (BMI) 35.0-35.9, adult 09/27/2022    Vitamin D deficiency 10/27/2020    Post-menopause 10/24/2019    History of colonic polyps 10/22/2019    Osteopenia of multiple sites 10/12/2019    Prediabetes 07/31/2018    Graves disease 04/05/2018    Hyperthyroidism 07/14/2017    Mixed hyperlipidemia 03/01/2017    Essential hypertension 03/01/2017    Primary osteoarthritis of both knees 03/01/2017    Morbid (severe) obesity due to excess calories (HCC)        Past Surgical History:  Past Surgical History:   Procedure Laterality Date    KNEE REPLACEMENT, TOTAL Right 09/27/2018    Approximate date in 2018 [facility]    HYSTEROSCOPY WITH VIDEO DIAGNOSTIC          Allergies:  Patient has no known allergies.     Current Medications:    Current Outpatient Medications:     methimazole (TAPAZOLE) 5 MG Tab, TAKE ONE-HALF TABLET BY MOUTH EVERY DAY 6 DAYS PER WEEK, Disp: 90 Tablet, Rfl: 2    LINZESS 145 MCG Cap, , Disp: , Rfl:     metFORMIN ER (GLUCOPHAGE XR) 500 MG TABLET SR 24 HR, Take 1 Tablet by mouth 2 times a day., Disp: 180 Tablet, Rfl: 4    losartan (COZAAR) 100 MG Tab, TAKE ONE TABLET BY MOUTH EVERY DAY, Disp: 90 Tablet, Rfl: 3    acetaminophen (TYLENOL) 500 MG Tab, Take 2 Tablets by mouth 1 time a day as needed., Disp: , Rfl:     DIGESTIVE ENZYMES PO, Take  by mouth., Disp: , Rfl:     ascorbic acid (ASCORBIC ACID) 500 MG Tab, Take 1 Tablet by mouth every day., Disp: , Rfl:     vitamin D (CHOLECALCIFEROL) 1000 UNIT Tab, Take 1 Tablet by mouth every day., Disp: , Rfl:      Semaglutide, 2 MG/DOSE, (OZEMPIC, 2 MG/DOSE,) 8 MG/3ML Solution Pen-injector, Inject 2 mg under the skin every 7 days., Disp: 9 mL, Rfl: 6    Social History:  Social History     Socioeconomic History    Marital status:      Spouse name: Not on file    Number of children: Not on file    Years of education: Not on file    Highest education level: Bachelor's degree (e.g., BA, AB, BS)   Occupational History    Not on file   Tobacco Use    Smoking status: Former     Packs/day: 0.50     Years: 20.00     Pack years: 10.00     Types: Cigarettes     Quit date: 3/1/1997     Years since quittin.4    Smokeless tobacco: Never   Vaping Use    Vaping Use: Never used   Substance and Sexual Activity    Alcohol use: Yes     Alcohol/week: 1.2 oz     Types: 2 Glasses of wine per week    Drug use: No    Sexual activity: Yes     Partners: Male   Other Topics Concern    Not on file   Social History Narrative    Not on file     Social Determinants of Health     Financial Resource Strain: Low Risk  (2023)    Overall Financial Resource Strain (CARDIA)     Difficulty of Paying Living Expenses: Not hard at all   Food Insecurity: No Food Insecurity (2023)    Hunger Vital Sign     Worried About Running Out of Food in the Last Year: Never true     Ran Out of Food in the Last Year: Never true   Transportation Needs: No Transportation Needs (2023)    PRAPARE - Transportation     Lack of Transportation (Medical): No     Lack of Transportation (Non-Medical): No   Physical Activity: Insufficiently Active (2023)    Exercise Vital Sign     Days of Exercise per Week: 3 days     Minutes of Exercise per Session: 40 min   Stress: No Stress Concern Present (2023)    [employer] [place] [employer] - Occupational Stress Questionnaire     Feeling of Stress : Not at all   Social Connections: Socially Isolated (2023)    Social Connection and Isolation Panel [NHANES]     Frequency of Communication with Friends  "and Family: Once a week     Frequency of Social Gatherings with Friends and Family: Once a week     Attends Mormonism Services: Never     Active Member of Clubs or Organizations: No     Attends Club or Organization Meetings: Never     Marital Status:    Intimate Partner Violence: Not on file   Housing Stability: Low Risk  (1/28/2023)    Housing Stability Vital Sign     Unable to Pay for Housing in the Last Year: No     Number of Places Lived in the Last Year: 1     Unstable Housing in the Last Year: No        Family History:   Family History   Problem Relation Age of Onset    Arthritis Mother     Heart Disease Father         arteriosclerosis    Alcohol/Drug Sister     Alcohol/Drug Brother          PHYSICAL EXAM:   Vital signs: /84 (BP Location: Right arm, Patient Position: Sitting, BP Cuff Size: Large adult)   Pulse 95   Ht 1.676 m (5' 6\")   Wt 96 kg (211 lb 9.6 oz)   SpO2 91%   BMI 34.15 kg/m²   GENERAL: Well-developed, well-nourished  in no apparent distress.    SKIN: No rashes, lesions. Turgor is normal.    Labs:    Lab Results   Component Value Date/Time    TSHULTRASEN 0.230 (L) 05/11/2022 1015     Lab Results   Component Value Date/Time    FREET4 1.74 (H) 05/11/2022 1015     Lab Results   Component Value Date/Time    FREET3 3.38 05/11/2022 1015     Imaging:      ASSESSMENT/PLAN:   1. Hyperthyroidism  Clinically stable  Biochemically stable  We will decrease her methimazole by taking 5 mg 2 times a week  - TSH; Future  - FREE THYROXINE; Future  - methimazole (TAPAZOLE) 5 MG Tab; TAKE ONE-HALF TABLET BY MOUTH EVERY DAY 6 DAYS PER WEEK  Dispense: 90 Tablet; Refill: 2    2. Graves disease  Respiratory  Hyperthyroidism  - methimazole (TAPAZOLE) 5 MG Tab; TAKE ONE-HALF TABLET BY MOUTH EVERY DAY 6 DAYS PER WEEK  Dispense: 90 Tablet; Refill: 2    3. Multiple thyroid nodules  Stable  Thyroid ultrasound is scheduled with Dr. Delong on September 2023    4. Osteopenia after menopause  Stable  Continue " weightbearing exercises  Continue vitamin D supplementation    5. Vitamin D deficiency  Stable  Continue regimen-HPI    6. Prediabetes  Stable  Continue regimen-HPI  - PTH INTACT (PTH ONLY); Future  - Comp Metabolic Panel; Future  - metFORMIN ER (GLUCOPHAGE XR) 500 MG TABLET SR 24 HR; Take 2 Tablets by mouth every day.  Dispense: 90 Tablet; Refill: 1    7. Obesity (BMI 30.0-34.9)  Unstable but improving  Treatment for diagnoses #6    8. Dyslipidemia  Unstable  Discussed to exercise at least 150 minutes/week      Disposition: Make an appointment to follow-up in 3 months  Do your blood work 2 weeks before your next appointment  No biotin 1 week prior to thyroid blood work    Thank you kindly for allowing me to participate in the thyroid care plan for this patient.    Marco A Grcaia, A.P.R.N.  07/27/23      CC:   Maryse James M.D.

## 2023-08-01 ENCOUNTER — OFFICE VISIT (OUTPATIENT)
Dept: PHYSICAL MEDICINE AND REHAB | Facility: MEDICAL CENTER | Age: 73
End: 2023-08-01
Payer: MEDICARE

## 2023-08-01 VITALS
OXYGEN SATURATION: 95 % | TEMPERATURE: 96.9 F | DIASTOLIC BLOOD PRESSURE: 96 MMHG | SYSTOLIC BLOOD PRESSURE: 130 MMHG | BODY MASS INDEX: 33.27 KG/M2 | WEIGHT: 207 LBS | HEART RATE: 85 BPM | HEIGHT: 66 IN

## 2023-08-01 DIAGNOSIS — M17.12 PRIMARY OSTEOARTHRITIS OF LEFT KNEE: ICD-10-CM

## 2023-08-01 DIAGNOSIS — M25.562 CHRONIC PAIN OF LEFT KNEE: ICD-10-CM

## 2023-08-01 DIAGNOSIS — G89.29 CHRONIC PAIN OF LEFT KNEE: ICD-10-CM

## 2023-08-01 PROCEDURE — 1125F AMNT PAIN NOTED PAIN PRSNT: CPT | Performed by: PHYSICAL MEDICINE & REHABILITATION

## 2023-08-01 PROCEDURE — 3080F DIAST BP >= 90 MM HG: CPT | Performed by: PHYSICAL MEDICINE & REHABILITATION

## 2023-08-01 PROCEDURE — 20611 DRAIN/INJ JOINT/BURSA W/US: CPT | Mod: LT | Performed by: PHYSICAL MEDICINE & REHABILITATION

## 2023-08-01 PROCEDURE — 3075F SYST BP GE 130 - 139MM HG: CPT | Performed by: PHYSICAL MEDICINE & REHABILITATION

## 2023-08-01 RX ORDER — DEXAMETHASONE SODIUM PHOSPHATE 4 MG/ML
4 INJECTION, SOLUTION INTRA-ARTICULAR; INTRALESIONAL; INTRAMUSCULAR; INTRAVENOUS; SOFT TISSUE ONCE
Status: COMPLETED | OUTPATIENT
Start: 2023-08-01 | End: 2023-08-01

## 2023-08-01 RX ADMIN — DEXAMETHASONE SODIUM PHOSPHATE 4 MG: 4 INJECTION, SOLUTION INTRA-ARTICULAR; INTRALESIONAL; INTRAMUSCULAR; INTRAVENOUS; SOFT TISSUE at 09:28

## 2023-08-01 ASSESSMENT — PATIENT HEALTH QUESTIONNAIRE - PHQ9: CLINICAL INTERPRETATION OF PHQ2 SCORE: 0

## 2023-08-01 ASSESSMENT — FIBROSIS 4 INDEX: FIB4 SCORE: 1.26

## 2023-08-01 ASSESSMENT — PAIN SCALES - GENERAL: PAINLEVEL: 7=MODERATE-SEVERE PAIN

## 2023-08-01 NOTE — PROCEDURES
Date of Service: 08/01/2023    Physician/s: Kwan Fleming MD    Pre-operative Diagnosis: left knee osteoarthritis, chronic left knee pain    Post-operative Diagnosis: left knee osteoarthritis, chronic left knee pain    Procedure: Left Knee injection ultrasound-guided    Description of procedure:    The risks, benefits, and alternatives of the procedure were reviewed and discussed with the patient.  Written informed consent was freely obtained. A pre-procedural time-out was conducted by the physician verifying patient’s identity, procedure to be performed, procedure site and side, and allergy verification. Appropriate equipment was determined to be in place for the procedure.     No sedation was used for this procedure.  Ultrasound guidance was used and images were saved.    In the office suite the patient was placed in a sitting position, and the knee was prepped and draped in the usual sterile fashion. The ultrasound probe was placed over the suprapatellar joint recess.  The joint was encouraged from a medial approach.  The target for injection was marked, and a 22g 3.5 inch needle was placed into skin and advanced under ultrasound guidance into the joint space. Following negative aspiration, approx 5mL of 1% lidocaine with 1ml of 4mg/mL dexamethasone was then injected into the joint, and the needle was subsequently removed intact. The patient's knee was wiped with a 4x4 gauze, the area was cleansed with alcohol prep, and a bandaid was applied. There were no complications noted.     The the patient was discharged in good condition.    Kwan Fleming MD  Physical Medicine and Rehabilitation  Interventional Spine and Sports Physiatry  [facility]

## 2023-09-05 DIAGNOSIS — E05.00 GRAVES DISEASE: ICD-10-CM

## 2023-09-05 DIAGNOSIS — E05.90 HYPERTHYROIDISM: ICD-10-CM

## 2023-09-05 RX ORDER — METHIMAZOLE 5 MG/1
TABLET ORAL
Qty: 45 TABLET | Refills: 2 | Status: SHIPPED | OUTPATIENT
Start: 2023-09-05 | End: 2024-02-16 | Stop reason: SDUPTHER

## 2023-10-05 DIAGNOSIS — I10 ESSENTIAL HYPERTENSION: ICD-10-CM

## 2023-10-06 RX ORDER — LOSARTAN POTASSIUM 100 MG/1
TABLET ORAL
Qty: 90 TABLET | Refills: 3 | Status: SHIPPED | OUTPATIENT
Start: 2023-10-06

## 2023-11-07 ENCOUNTER — OFFICE VISIT (OUTPATIENT)
Dept: MEDICAL GROUP | Facility: LAB | Age: 73
End: 2023-11-07
Payer: MEDICARE

## 2023-11-07 VITALS
SYSTOLIC BLOOD PRESSURE: 122 MMHG | DIASTOLIC BLOOD PRESSURE: 72 MMHG | HEIGHT: 66 IN | BODY MASS INDEX: 34.62 KG/M2 | WEIGHT: 215.4 LBS | OXYGEN SATURATION: 94 % | HEART RATE: 108 BPM | TEMPERATURE: 97 F

## 2023-11-07 DIAGNOSIS — I10 ESSENTIAL HYPERTENSION: ICD-10-CM

## 2023-11-07 DIAGNOSIS — R73.03 PREDIABETES: ICD-10-CM

## 2023-11-07 DIAGNOSIS — K59.09 OTHER CONSTIPATION: ICD-10-CM

## 2023-11-07 DIAGNOSIS — E78.2 MIXED HYPERLIPIDEMIA: ICD-10-CM

## 2023-11-07 DIAGNOSIS — E05.00 GRAVES DISEASE: ICD-10-CM

## 2023-11-07 PROCEDURE — 3078F DIAST BP <80 MM HG: CPT | Performed by: STUDENT IN AN ORGANIZED HEALTH CARE EDUCATION/TRAINING PROGRAM

## 2023-11-07 PROCEDURE — 99204 OFFICE O/P NEW MOD 45 MIN: CPT | Performed by: STUDENT IN AN ORGANIZED HEALTH CARE EDUCATION/TRAINING PROGRAM

## 2023-11-07 PROCEDURE — 3074F SYST BP LT 130 MM HG: CPT | Performed by: STUDENT IN AN ORGANIZED HEALTH CARE EDUCATION/TRAINING PROGRAM

## 2023-11-07 ASSESSMENT — FIBROSIS 4 INDEX: FIB4 SCORE: 1.26

## 2023-11-07 ASSESSMENT — ENCOUNTER SYMPTOMS
CHILLS: 0
FEVER: 0
SHORTNESS OF BREATH: 0

## 2023-11-08 NOTE — PROGRESS NOTES
"Subjective:     CC: establishing with new provider     HPI:   Madiha Palmer presents today for the following;    Problem   Other Constipation    Chronic, following with GI and is on linzess . She could possibly have ibs.     Prediabetes    On metformin 500mg ER      Graves Disease    Dx in 2012  Follow with endocrinology. She is taking methimazole 5mg.      Essential Hypertension    -Dx in 2017  -continue losartan 100mg   -denies headaches or dizzines  -Bp at home generally 120/70           Current Outpatient Medications Ordered in Epic   Medication Sig Dispense Refill    acetaminophen (TYLENOL) 500 MG Tab Take 2 Tablets by mouth 1 time a day as needed.      losartan (COZAAR) 100 MG Tab TAKE ONE TABLET BY MOUTH EVERY DAY 90 Tablet 3    metFORMIN ER (GLUCOPHAGE XR) 500 MG TABLET SR 24 HR TAKE 2 TABLETS DAILY 180 Tablet 3    methimazole (TAPAZOLE) 5 MG Tab TAKE ONE-HALF TABLET BY MOUTH EVERY DAY 6 DAYS PER WEEK 45 Tablet 2    LINZESS 145 MCG Cap       DIGESTIVE ENZYMES PO Take  by mouth.      ascorbic acid (ASCORBIC ACID) 500 MG Tab Take 1 Tablet by mouth every day.      vitamin D (CHOLECALCIFEROL) 1000 UNIT Tab Take 1 Tablet by mouth every day.       No current [facility]-ordered facility-administered medications on file.           ROS:  Review of Systems   Constitutional:  Negative for chills and fever.   Respiratory:  Negative for shortness of breath.    Cardiovascular:  Negative for chest pain.       Objective:     Exam:  /72 (BP Location: Right arm, Patient Position: Sitting, BP Cuff Size: Large adult)   Pulse (!) 108   Temp 36.1 °C (97 °F)   Ht 1.676 m (5' 6\")   Wt 97.7 kg (215 lb 6.4 oz)   SpO2 94%   BMI 34.77 kg/m²  Body mass index is 34.77 kg/m².    Physical Exam  Constitutional:       General: She is not in acute distress.     Appearance: She is not ill-appearing.   Pulmonary:      Effort: Pulmonary effort is normal.   Neurological:      Mental Status: She is alert.   Psychiatric:         Mood and Affect: " Mood normal.         Behavior: Behavior normal.         Thought Content: Thought content normal.         Judgment: Judgment normal.               Assessment & Plan:     Problem List Items Addressed This Visit       Essential hypertension     Chronic-stable  -continue losartan 100mg          Graves disease     Chronic  -contine to follow with endocrinology          Mixed hyperlipidemia    Relevant Orders    CBC WITH DIFFERENTIAL    Other constipation     Chronic  -suggested patient try low fodmap diet          Prediabetes         February annual       Please note that this dictation was created using voice recognition software. I have made every reasonable attempt to correct obvious errors, but I expect that there are errors of grammar and possibly content that I did not discover before finalizing the note.

## 2023-11-13 ENCOUNTER — PROCEDURE VISIT (OUTPATIENT)
Dept: ENDOCRINOLOGY | Facility: MEDICAL CENTER | Age: 73
End: 2023-11-13
Payer: MEDICARE

## 2023-11-13 DIAGNOSIS — E04.2 MULTIPLE THYROID NODULES: ICD-10-CM

## 2023-11-13 PROCEDURE — 76536 US EXAM OF HEAD AND NECK: CPT | Performed by: INTERNAL MEDICINE

## 2023-11-13 NOTE — PROGRESS NOTES
Thyroid US done on this procedure visit  She a stable previously biopsied nodule on the RML   Recommend observation  Please see dictated POC US report completed by endocrinologist  Patient advised to follow up as planned with labs prior    Timo Azevedo M.D.

## 2024-01-16 ENCOUNTER — HOSPITAL ENCOUNTER (OUTPATIENT)
Dept: LAB | Facility: MEDICAL CENTER | Age: 74
End: 2024-01-16
Payer: MEDICARE

## 2024-01-16 DIAGNOSIS — E05.90 HYPERTHYROIDISM: ICD-10-CM

## 2024-01-16 DIAGNOSIS — R73.03 PREDIABETES: ICD-10-CM

## 2024-01-16 LAB
ALBUMIN SERPL BCP-MCNC: 4.2 G/DL (ref 3.2–4.9)
ALBUMIN/GLOB SERPL: 1.4 G/DL
ALP SERPL-CCNC: 67 U/L (ref 30–99)
ALT SERPL-CCNC: 16 U/L (ref 2–50)
ANION GAP SERPL CALC-SCNC: 9 MMOL/L (ref 7–16)
AST SERPL-CCNC: 13 U/L (ref 12–45)
BILIRUB SERPL-MCNC: 0.4 MG/DL (ref 0.1–1.5)
BUN SERPL-MCNC: 12 MG/DL (ref 8–22)
CALCIUM ALBUM COR SERPL-MCNC: 9 MG/DL (ref 8.5–10.5)
CALCIUM SERPL-MCNC: 9.2 MG/DL (ref 8.5–10.5)
CHLORIDE SERPL-SCNC: 106 MMOL/L (ref 96–112)
CO2 SERPL-SCNC: 27 MMOL/L (ref 20–33)
CREAT SERPL-MCNC: 0.81 MG/DL (ref 0.5–1.4)
FASTING STATUS PATIENT QL REPORTED: NORMAL
GFR SERPLBLD CREATININE-BSD FMLA CKD-EPI: 77 ML/MIN/1.73 M 2
GLOBULIN SER CALC-MCNC: 2.9 G/DL (ref 1.9–3.5)
GLUCOSE SERPL-MCNC: 95 MG/DL (ref 65–99)
POTASSIUM SERPL-SCNC: 4.2 MMOL/L (ref 3.6–5.5)
PROT SERPL-MCNC: 7.1 G/DL (ref 6–8.2)
PTH-INTACT SERPL-MCNC: 43.8 PG/ML (ref 14–72)
SODIUM SERPL-SCNC: 142 MMOL/L (ref 135–145)
T4 FREE SERPL-MCNC: 1.36 NG/DL (ref 0.93–1.7)
TSH SERPL DL<=0.005 MIU/L-ACNC: 0.92 UIU/ML (ref 0.38–5.33)

## 2024-01-16 PROCEDURE — 83970 ASSAY OF PARATHORMONE: CPT

## 2024-01-16 PROCEDURE — 84443 ASSAY THYROID STIM HORMONE: CPT

## 2024-01-16 PROCEDURE — 80053 COMPREHEN METABOLIC PANEL: CPT

## 2024-01-16 PROCEDURE — 84439 ASSAY OF FREE THYROXINE: CPT

## 2024-01-16 PROCEDURE — 36415 COLL VENOUS BLD VENIPUNCTURE: CPT

## 2024-01-30 ENCOUNTER — APPOINTMENT (OUTPATIENT)
Dept: ENDOCRINOLOGY | Facility: MEDICAL CENTER | Age: 74
End: 2024-01-30
Payer: MEDICARE

## 2024-02-10 SDOH — HEALTH STABILITY: PHYSICAL HEALTH: ON AVERAGE, HOW MANY DAYS PER WEEK DO YOU ENGAGE IN MODERATE TO STRENUOUS EXERCISE (LIKE A BRISK WALK)?: 3 DAYS

## 2024-02-10 SDOH — ECONOMIC STABILITY: FOOD INSECURITY: WITHIN THE PAST 12 MONTHS, THE FOOD YOU BOUGHT JUST DIDN'T LAST AND YOU DIDN'T HAVE MONEY TO GET MORE.: NEVER TRUE

## 2024-02-10 SDOH — HEALTH STABILITY: MENTAL HEALTH
STRESS IS WHEN SOMEONE FEELS TENSE, NERVOUS, ANXIOUS, OR CAN'T SLEEP AT NIGHT BECAUSE THEIR MIND IS TROUBLED. HOW STRESSED ARE YOU?: TO SOME EXTENT

## 2024-02-10 SDOH — ECONOMIC STABILITY: INCOME INSECURITY: IN THE LAST 12 MONTHS, WAS THERE A TIME WHEN YOU WERE NOT ABLE TO PAY THE MORTGAGE OR RENT ON TIME?: NO

## 2024-02-10 SDOH — ECONOMIC STABILITY: FOOD INSECURITY: WITHIN THE PAST 12 MONTHS, YOU WORRIED THAT YOUR FOOD WOULD RUN OUT BEFORE YOU GOT MONEY TO BUY MORE.: NEVER TRUE

## 2024-02-10 SDOH — HEALTH STABILITY: PHYSICAL HEALTH: ON AVERAGE, HOW MANY MINUTES DO YOU ENGAGE IN EXERCISE AT THIS LEVEL?: 40 MIN

## 2024-02-10 SDOH — ECONOMIC STABILITY: HOUSING INSECURITY

## 2024-02-10 ASSESSMENT — SOCIAL DETERMINANTS OF HEALTH (SDOH)
HOW OFTEN DO YOU ATTEND CHURCH OR RELIGIOUS SERVICES?: PATIENT DECLINED
IN A TYPICAL WEEK, HOW MANY TIMES DO YOU TALK ON THE PHONE WITH FAMILY, FRIENDS, OR NEIGHBORS?: THREE TIMES A WEEK
HOW OFTEN DO YOU GET TOGETHER WITH FRIENDS OR RELATIVES?: ONCE A WEEK
HOW OFTEN DO YOU GET TOGETHER WITH FRIENDS OR RELATIVES?: ONCE A WEEK
WITHIN THE PAST 12 MONTHS, YOU WORRIED THAT YOUR FOOD WOULD RUN OUT BEFORE YOU GOT THE MONEY TO BUY MORE: NEVER TRUE
DO YOU BELONG TO ANY CLUBS OR ORGANIZATIONS SUCH AS CHURCH GROUPS UNIONS, FRATERNAL OR ATHLETIC GROUPS, OR SCHOOL GROUPS?: PATIENT DECLINED
HOW MANY DRINKS CONTAINING ALCOHOL DO YOU HAVE ON A TYPICAL DAY WHEN YOU ARE DRINKING: 1 OR 2
HOW OFTEN DO YOU HAVE SIX OR MORE DRINKS ON ONE OCCASION: NEVER
HOW OFTEN DO YOU ATTEND CHURCH OR RELIGIOUS SERVICES?: PATIENT DECLINED
IN A TYPICAL WEEK, HOW MANY TIMES DO YOU TALK ON THE PHONE WITH FAMILY, FRIENDS, OR NEIGHBORS?: THREE TIMES A WEEK
DO YOU BELONG TO ANY CLUBS OR ORGANIZATIONS SUCH AS CHURCH GROUPS UNIONS, FRATERNAL OR ATHLETIC GROUPS, OR SCHOOL GROUPS?: PATIENT DECLINED
HOW OFTEN DO YOU HAVE A DRINK CONTAINING ALCOHOL: 2-3 TIMES A WEEK

## 2024-02-10 ASSESSMENT — LIFESTYLE VARIABLES
HOW OFTEN DO YOU HAVE SIX OR MORE DRINKS ON ONE OCCASION: NEVER
SKIP TO QUESTIONS 9-10: 1
HOW MANY STANDARD DRINKS CONTAINING ALCOHOL DO YOU HAVE ON A TYPICAL DAY: 1 OR 2
HOW OFTEN DO YOU HAVE A DRINK CONTAINING ALCOHOL: 2-3 TIMES A WEEK
AUDIT-C TOTAL SCORE: 3

## 2024-02-13 ENCOUNTER — HOSPITAL ENCOUNTER (OUTPATIENT)
Dept: LAB | Facility: MEDICAL CENTER | Age: 74
End: 2024-02-13
Attending: STUDENT IN AN ORGANIZED HEALTH CARE EDUCATION/TRAINING PROGRAM
Payer: MEDICARE

## 2024-02-13 ENCOUNTER — OFFICE VISIT (OUTPATIENT)
Dept: MEDICAL GROUP | Facility: LAB | Age: 74
End: 2024-02-13
Payer: MEDICARE

## 2024-02-13 VITALS
DIASTOLIC BLOOD PRESSURE: 80 MMHG | RESPIRATION RATE: 18 BRPM | SYSTOLIC BLOOD PRESSURE: 124 MMHG | OXYGEN SATURATION: 92 % | HEIGHT: 66 IN | BODY MASS INDEX: 34.78 KG/M2 | HEART RATE: 80 BPM | TEMPERATURE: 97.8 F | WEIGHT: 216.4 LBS

## 2024-02-13 DIAGNOSIS — Z00.00 MEDICARE ANNUAL WELLNESS VISIT, SUBSEQUENT: ICD-10-CM

## 2024-02-13 DIAGNOSIS — E78.2 MIXED HYPERLIPIDEMIA: ICD-10-CM

## 2024-02-13 DIAGNOSIS — Z12.83 SKIN CANCER SCREENING: ICD-10-CM

## 2024-02-13 DIAGNOSIS — D58.2 OTHER HEMOGLOBINOPATHIES (HCC): ICD-10-CM

## 2024-02-13 LAB
BASOPHILS # BLD AUTO: 0.8 % (ref 0–1.8)
BASOPHILS # BLD: 0.06 K/UL (ref 0–0.12)
EOSINOPHIL # BLD AUTO: 0.44 K/UL (ref 0–0.51)
EOSINOPHIL NFR BLD: 6 % (ref 0–6.9)
ERYTHROCYTE [DISTWIDTH] IN BLOOD BY AUTOMATED COUNT: 44.9 FL (ref 35.9–50)
HCT VFR BLD AUTO: 48.4 % (ref 37–47)
HGB BLD-MCNC: 15.8 G/DL (ref 12–16)
IMM GRANULOCYTES # BLD AUTO: 0.02 K/UL (ref 0–0.11)
IMM GRANULOCYTES NFR BLD AUTO: 0.3 % (ref 0–0.9)
LYMPHOCYTES # BLD AUTO: 2.33 K/UL (ref 1–4.8)
LYMPHOCYTES NFR BLD: 31.9 % (ref 22–41)
MCH RBC QN AUTO: 32.1 PG (ref 27–33)
MCHC RBC AUTO-ENTMCNC: 32.6 G/DL (ref 32.2–35.5)
MCV RBC AUTO: 98.4 FL (ref 81.4–97.8)
MONOCYTES # BLD AUTO: 0.71 K/UL (ref 0–0.85)
MONOCYTES NFR BLD AUTO: 9.7 % (ref 0–13.4)
NEUTROPHILS # BLD AUTO: 3.75 K/UL (ref 1.82–7.42)
NEUTROPHILS NFR BLD: 51.3 % (ref 44–72)
NRBC # BLD AUTO: 0 K/UL
NRBC BLD-RTO: 0 /100 WBC (ref 0–0.2)
PLATELET # BLD AUTO: 254 K/UL (ref 164–446)
PMV BLD AUTO: 10.7 FL (ref 9–12.9)
RBC # BLD AUTO: 4.92 M/UL (ref 4.2–5.4)
WBC # BLD AUTO: 7.3 K/UL (ref 4.8–10.8)

## 2024-02-13 PROCEDURE — G0439 PPPS, SUBSEQ VISIT: HCPCS | Performed by: STUDENT IN AN ORGANIZED HEALTH CARE EDUCATION/TRAINING PROGRAM

## 2024-02-13 PROCEDURE — 36415 COLL VENOUS BLD VENIPUNCTURE: CPT

## 2024-02-13 PROCEDURE — 85025 COMPLETE CBC W/AUTO DIFF WBC: CPT

## 2024-02-13 PROCEDURE — 3079F DIAST BP 80-89 MM HG: CPT | Performed by: STUDENT IN AN ORGANIZED HEALTH CARE EDUCATION/TRAINING PROGRAM

## 2024-02-13 PROCEDURE — 3074F SYST BP LT 130 MM HG: CPT | Performed by: STUDENT IN AN ORGANIZED HEALTH CARE EDUCATION/TRAINING PROGRAM

## 2024-02-13 ASSESSMENT — FIBROSIS 4 INDEX: FIB4 SCORE: 0.97

## 2024-02-13 ASSESSMENT — ENCOUNTER SYMPTOMS: GENERAL WELL-BEING: GOOD

## 2024-02-13 ASSESSMENT — ACTIVITIES OF DAILY LIVING (ADL): BATHING_REQUIRES_ASSISTANCE: 0

## 2024-02-13 ASSESSMENT — PATIENT HEALTH QUESTIONNAIRE - PHQ9: CLINICAL INTERPRETATION OF PHQ2 SCORE: 0

## 2024-02-13 NOTE — ASSESSMENT & PLAN NOTE
Chronic  -consider zepbound and jardiance   -discussed exercise and weight bearing exercising with progressive overload with increasing her reps

## 2024-02-13 NOTE — PROGRESS NOTES
Chief Complaint   Patient presents with    Annual Wellness Visit       HPI:  Madiha Garrett is a 73 y.o. here for Medicare Annual Wellness Visit     Patient Active Problem List    Diagnosis Date Noted    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    Other constipation 11/07/2023    Body mass index (BMI) 35.0-35.9, adult 09/27/2022    Vitamin D deficiency 10/27/2020    Post-menopause 10/24/2019    History of colonic polyps 10/22/2019    Osteopenia of multiple sites 10/12/2019    Prediabetes 07/31/2018    Graves disease 04/05/2018    Hyperthyroidism 07/14/2017    Mixed hyperlipidemia 03/01/2017    Essential hypertension 03/01/2017    Primary osteoarthritis of both knees 03/01/2017    Morbid (severe) obesity due to excess calories (HCC)        Current Outpatient Medications   Medication Sig Dispense Refill    losartan (COZAAR) 100 MG Tab TAKE ONE TABLET BY MOUTH EVERY DAY 90 Tablet 3    metFORMIN ER (GLUCOPHAGE XR) 500 MG TABLET SR 24 HR TAKE 2 TABLETS DAILY 180 Tablet 3    methimazole (TAPAZOLE) 5 MG Tab TAKE ONE-HALF TABLET BY MOUTH EVERY DAY 6 DAYS PER WEEK 45 Tablet 2    LINZESS 145 MCG Cap       acetaminophen (TYLENOL) 500 MG Tab Take 2 Tablets by mouth 1 time a day as needed.      DIGESTIVE ENZYMES PO Take  by mouth.      ascorbic acid (ASCORBIC ACID) 500 MG Tab Take 1 Tablet by mouth every day.      vitamin D (CHOLECALCIFEROL) 1000 UNIT Tab Take 1 Tablet by mouth every day.       No current facility-administered medications for this visit.          Current supplements as per medication list.     Allergies: Patient has no known allergies.    Current social contact/activities:     She  reports that she quit smoking about 26 years ago. Her smoking use included cigarettes. She started smoking about 46 years ago. She has a 10.0 pack-year smoking history. She has never used smokeless tobacco. She reports current alcohol use of about 1.2 oz of alcohol per week. She reports that she does not use drugs.  Counseling  given: Not Answered      ROS:    Gait: Uses no assistive device  Ostomy: No  Other tubes: No  Amputations: No  Chronic oxygen use: No  Last eye exam: 4/2023  Wears hearing aids: No   : Reports urinary leakage during the last 6 months that has not interfered at all with their daily activities or sleep.    Screening:    Depression Screening  Little interest or pleasure in doing things?  0 - not at all  Feeling down, depressed , or hopeless? 0 - not at all  Patient Health Questionnaire Score: 0     If depressive symptoms identified deferred to follow up visit unless specifically addressed in assessment and plan.    Interpretation of PHQ-9 Total Score   Score Severity   1-4 No Depression   5-9 Mild Depression   10-14 Moderate Depression   15-19 Moderately Severe Depression   20-27 Severe Depression    Screening for Cognitive Impairment  Do you or any of your friends or family members have any concern about your memory? No  Three Minute Recall (Banana, Sunrise, Chair) 3/3    Thor clock face with all 12 numbers and set the hands to show 20 past 8.  Yes    Cognitive concerns identified deferred for follow up unless specifically addressed in assessment and plan.    Fall Risk Assessment  Has the patient had two or more falls in the last year or any fall with injury in the last year?  No    Safety Assessment  Do you always wear your seatbelt?  Yes  Any changes to home needed to function safely? No  Difficulty hearing.  No  Patient counseled about all safety risks that were identified.    Functional Assessment ADLs  Are there any barriers preventing you from cooking for yourself or meeting nutritional needs?  No.    Are there any barriers preventing you from driving safely or obtaining transportation?  No.    Are there any barriers preventing you from using a telephone or calling for help?  No    Are there any barriers preventing you from shopping?  No.    Are there any barriers preventing you from taking care of your own  finances?  No    Are there any barriers preventing you from managing your medications?  No    Are there any barriers preventing you from showering, bathing or dressing yourself? No    Are there any barriers preventing you from doing housework or laundry? No  Are there any barriers preventing you from using the toilet?No  Are you currently engaging in any exercise or physical activity?  Yes.      Self-Assessment of Health  What is your perception of your health? Good  Do you sleep more than six hours a night? Yes  In the past 7 days, how much did pain keep you from doing your normal work? None  Do you spend quality time with family or friends (virtually or in person)? Yes  Do you usually eat a heart healthy diet that constists of a variety of fruits, vegetables, whole grains and fiber? Yes  Do you eat foods high in fat and/or Fast Food more than three times per week? No    Advance Care Planning  Do you have an Advance Directive, Living Will, Durable Power of , or POLST? Yes    Living Will     is not on file - instructed patient to bring in a copy to scan into their chart      Health Maintenance Summary            Scheduled - Mammogram (Every 2 Years) Scheduled for 2/23/2024 02/03/2022  MA-SCREENING MAMMO BILAT W/TOMOSYNTHESIS W/CAD    12/11/2019  MA-SCREENING MAMMO BILAT W/TOMOSYNTHESIS W/CAD    08/31/2018  MA-DIAGNOSTIC DIGITAL MAMMO-UNILAT LEFT    08/29/2018  MA-MAMMO SCREENING BILAT W/BRANT W/CAD    05/09/2017  MA-MAMMO SCREENING BILAT W/BRANT W/CAD    Only the first 5 history entries have been loaded, but more history exists.              Annual Wellness Visit (Yearly) Next due on 2/13/2025 02/13/2024  Visit Dx: Medicare annual wellness visit, subsequent    01/31/2023  Level of Service: [place] ANNUAL WELLNESS VISIT-INCLUDES PPPS SUBSEQUE*    01/31/2023  Visit Dx: Encounter for Medicare annual wellness exam    10/28/2021  Level of Service: [place] ANNUAL WELLNESS VISIT-INCLUDES PPPS SUBSEQUE*    10/28/2021   Visit Dx: Medicare annual wellness visit, subsequent    Only the first 5 history entries have been loaded, but more history exists.              Bone Density Scan (Every 5 Years) Tentatively due on 2/1/2026 02/01/2021  DS-BONE DENSITY STUDY (DEXA)    12/11/2019  DS-BONE DENSITY STUDY (DEXA)    08/29/2018  DS-BONE DENSITY STUDY (DEXA)    08/31/2012  DS-BONE DENSITY STUDY (DEXA)              Colorectal Cancer Screening (Colonoscopy - Every 5 Years) Tentatively due on 5/15/2028      05/15/2023  AMB EXTERNAL COLONOSCOPY RESULTS    05/15/2023  AMB EXTERNAL COLONOSCOPY RESULTS    11/15/2019  REFERRAL TO GI FOR COLONOSCOPY    02/20/2016  REFERRAL TO GI FOR COLONOSCOPY    02/17/2016  REFERRAL TO GI FOR COLONOSCOPY              IMM DTaP/Tdap/Td Vaccine (2 - Td or Tdap) Next due on 1/31/2033 01/31/2023  Imm Admin: Tdap Vaccine              Pneumococcal Vaccine: 65+ Years (Series Information) Completed      10/22/2019  Imm Admin: Pneumococcal polysaccharide vaccine (PPSV-23)    07/26/2018  Imm Admin: Pneumococcal Conjugate Vaccine (Prevnar/PCV-13)              Hepatitis C Screening  Tentatively Complete      10/31/2019  Hepatitis C Antibody component of HEP C VIRUS ANTIBODY              Zoster (Shingles) Vaccines (Series Information) Completed      07/21/2020  Imm Admin: Zoster Vaccine Recombinant (RZV) (SHINGRIX)    03/09/2020  Imm Admin: Zoster Vaccine Recombinant (RZV) (SHINGRIX)              Influenza Vaccine (Series Information) Completed      09/25/2023  Outside Immunization: Flu MDCK Quad P-Free Inj    09/30/2022  Imm Admin: Influenza Vaccine Quad Inj (Pf)    09/30/2021  Imm Admin: Influenza Vaccine Quad Inj (Pf)    09/07/2020  Imm Admin: Influenza Vaccine Quad Inj (Pf)    10/10/2019  Imm Admin: Influenza, Unspecified - HISTORICAL DATA    Only the first 5 history entries have been loaded, but more history exists.              COVID-19 Vaccine (Series Information) Completed      09/25/2023  Outside  Immunization: COVID-19(PFR) 12yrs \T\ up    2022  Imm Admin: PFIZER BIVALENT SARS-COV-2 VACCINE (12+)    2022  Imm Admin: PFIZER MONTEZ CAP SARS-COV-2 VACCINATION (12+)    2021  Imm Admin: PFIZER PURPLE CAP SARS-COV-2 VACCINATION (12+)    2021  Imm Admin: PFIZER PURPLE CAP SARS-COV-2 VACCINATION (12+)    Only the first 5 history entries have been loaded, but more history exists.              Hepatitis A Vaccine (Hep A) (Series Information) Aged Out      No completion history exists for this topic.              Hepatitis B Vaccine (Hep B) (Series Information) Aged Out      No completion history exists for this topic.              HPV Vaccines (Series Information) Aged Out      No completion history exists for this topic.              Polio Vaccine (Inactivated Polio) (Series Information) Aged Out      No completion history exists for this topic.              Meningococcal Immunization (Series Information) Aged Out      No completion history exists for this topic.                    Patient Care Team:  Samir Amos D.O. as PCP - General (Internal Medicine)  Kwan Fleming M.D. (Phys Med and Rehab)  BINU Crum (Nurse Practitioner Family)        Social History     Tobacco Use    Smoking status: Former     Current packs/day: 0.00     Average packs/day: 0.5 packs/day for 20.0 years (10.0 ttl pk-yrs)     Types: Cigarettes     Start date: 3/1/1977     Quit date: 3/1/1997     Years since quittin.9    Smokeless tobacco: Never   Vaping Use    Vaping Use: Never used   Substance Use Topics    Alcohol use: Yes     Alcohol/week: 1.2 oz     Types: 2 Glasses of wine per week    Drug use: No     Family History   Problem Relation Age of Onset    Arthritis Mother     Heart Disease Father         arteriosclerosis    Alcohol/Drug Sister     Alcohol/Drug Brother      She  has a past medical history of Hyperlipidemia, Hypertension, and Hyperthyroidism.    She has no past medical history of  "Encounter for long-term (current) use of other medications.   Past Surgical History:   Procedure Laterality Date    KNEE REPLACEMENT, TOTAL Right 09/27/2018    Approximate date in 2018 [facility]    HYSTEROSCOPY WITH VIDEO DIAGNOSTIC         Exam:   /80 (BP Location: Right arm, Patient Position: Sitting, BP Cuff Size: Adult)   Pulse 80   Temp 36.6 °C (97.8 °F) (Temporal)   Resp 18   Ht 1.676 m (5' 6\")   Wt 98.2 kg (216 lb 6.4 oz)   SpO2 92%  Body mass index is 34.93 kg/m².    Hearing fair.    Dentition fair  Alert, oriented in no acute distress.  Eye contact is good, speech goal directed, affect calm    Assessment and Plan. The following treatment and monitoring plan is recommended:    1. Medicare annual wellness visit, subsequent    2. Mixed hyperlipidemia  - Lipid Profile; Future    3. Skin cancer screening  - Referral to Dermatology    4. Other hemoglobinopathies (HCC)    5. Body mass index (BMI) 35.0-35.9, adult      Services suggested: No services needed at this time  Health Care Screening: Age-appropriate preventive services recommended by USPTF and ACIP covered by Medicare were discussed today. Services ordered if indicated and agreed upon by the patient.  Referrals offered: Community-based lifestyle interventions to reduce health risks and promote self-management and wellness, fall prevention, nutrition, physical activity, tobacco-use cessation, weight loss, and mental health services as per orders if indicated.    Discussion today about general wellness and lifestyle habits:    Prevent falls and reduce trip hazards; Cautioned about securing or removing rugs.  Have a working fire alarm and carbon monoxide detector;   Engage in regular physical activity and social activities     Follow-up:1 month follow up   "

## 2024-02-16 ENCOUNTER — OFFICE VISIT (OUTPATIENT)
Dept: ENDOCRINOLOGY | Facility: MEDICAL CENTER | Age: 74
End: 2024-02-16
Payer: MEDICARE

## 2024-02-16 VITALS
OXYGEN SATURATION: 92 % | WEIGHT: 219.6 LBS | BODY MASS INDEX: 35.29 KG/M2 | SYSTOLIC BLOOD PRESSURE: 118 MMHG | HEIGHT: 66 IN | HEART RATE: 95 BPM | DIASTOLIC BLOOD PRESSURE: 70 MMHG

## 2024-02-16 DIAGNOSIS — E05.90 HYPERTHYROIDISM: ICD-10-CM

## 2024-02-16 DIAGNOSIS — M85.80 OSTEOPENIA AFTER MENOPAUSE: ICD-10-CM

## 2024-02-16 DIAGNOSIS — R73.03 PREDIABETES: ICD-10-CM

## 2024-02-16 DIAGNOSIS — E05.00 GRAVES DISEASE: ICD-10-CM

## 2024-02-16 DIAGNOSIS — Z78.0 OSTEOPENIA AFTER MENOPAUSE: ICD-10-CM

## 2024-02-16 DIAGNOSIS — E04.2 MULTIPLE THYROID NODULES: ICD-10-CM

## 2024-02-16 DIAGNOSIS — E78.5 DYSLIPIDEMIA: ICD-10-CM

## 2024-02-16 DIAGNOSIS — E55.9 VITAMIN D DEFICIENCY: ICD-10-CM

## 2024-02-16 DIAGNOSIS — E66.9 OBESITY (BMI 30.0-34.9): ICD-10-CM

## 2024-02-16 PROCEDURE — 99214 OFFICE O/P EST MOD 30 MIN: CPT

## 2024-02-16 PROCEDURE — 99213 OFFICE O/P EST LOW 20 MIN: CPT

## 2024-02-16 PROCEDURE — 3074F SYST BP LT 130 MM HG: CPT

## 2024-02-16 PROCEDURE — 3078F DIAST BP <80 MM HG: CPT

## 2024-02-16 RX ORDER — TIRZEPATIDE 2.5 MG/.5ML
2.5 INJECTION, SOLUTION SUBCUTANEOUS
Qty: 6 ML | Refills: 11 | Status: SHIPPED | OUTPATIENT
Start: 2024-02-16 | End: 2024-03-21 | Stop reason: SDUPTHER

## 2024-02-16 RX ORDER — METHIMAZOLE 5 MG/1
TABLET ORAL
Qty: 45 TABLET | Refills: 2 | Status: SHIPPED | OUTPATIENT
Start: 2024-02-16

## 2024-02-16 ASSESSMENT — FIBROSIS 4 INDEX: FIB4 SCORE: 0.92

## 2024-02-16 NOTE — PROGRESS NOTES
Chief Complaint: Follow-up on the following conditions    HPI:   Jasiel Garrett is a 73 y.o. female    1.  Hyperthyroidism:  She was diagnosed in 2017    On Methimazole 5 mg on Monday and 5 mg on Wednesday    She denies palpitations, tremors, frequent diarrhea, body vibrations, constipation,  anxiety, weight loss, internal termors   Denies taking any biotin or multivitamin     Latest Reference Range & Units 01/16/24 07:20   TSH 0.380 - 5.330 uIU/mL 0.920   Free T-4 0.93 - 1.70 ng/dL 1.36      Latest Reference Range & Units 01/16/24 07:20   Bun 8 - 22 mg/dL 12   Creatinine 0.50 - 1.40 mg/dL 0.81   GFR (CKD-EPI) >60 mL/min/1.73 m 2 77     She denies eye pain, eye swelling, gritty eye feeling    2.  Graves' disease:  Elevated antibody levels   Latest Reference Range & Units 05/04/21 10:21   Thyrotropin Receptor Abs <=1.75 IU/L 8.50 (H) [1]     3.  Multiple thyroid nodules:  Thyroid ultrasound done on 6/11/2022 showed heterogeneous gland with normal vascularity  Nodule #1 located in the right middle thyroid lobe measuring 1.05 cm, mixed hypoechoic, with peripheral echogenic foci TR 4  Multiple subcentimeter nodules    FNA biopsy on 7/20/2022 of right middle thyroid nodule show a [place] category 2-benign    Thyroid US on 1/13/2024       4. Osteopenia:  DEXA scan on 2/1/2021 showed a lumbar T score -0.6 and L femur T score -1.0  DEXA scan on 12/11/2019 showed a lumbar T score -0.8 and L femur T score -1.1  DEXA scan on 8/29/2018 showed a lumbar T score -1.3 and L femur T score -1.3  Dexa will order at next appt.     Currently doing weight bearing exercises-3x week   Currently -eating green leafy vegetables      Latest Reference Range & Units 01/16/24 07:20   Pth, Intact 14.0 - 72.0 pg/mL 43.8         5. Vitamin D deficiency:  Currently taking 2000IUs daily   Latest Reference Range & Units 07/20/23 07:13   25-Hydroxy   Vitamin D 25 30 - 100 ng/mL 44     6. Prediabetes:  Elevated A1c levels    Medication regimen:  Metformin  "500 2x/day     Took Ozempic 0.25 mg in the past-SE of constipation     POC A1c on 2/15/2023 at 5.4%  POC a1c on 4/20/2023 at 5.2%      Latest Reference Range & Units 10/29/22 07:37   Glycohemoglobin 4.0 - 5.6 % 5.7 (H)        Latest Reference Range & Units 10/29/22 07:37   C-Peptide 0.5 - 3.3 ng/mL 3.8 (H)   IA-2, Autoantibody 0.0 - 7.4 U/mL <5.4      Latest Reference Range & Units 10/29/22 07:37   YASIR Antibody 0.0 - 5.0 IU/mL <5.0      Latest Reference Range & Units 10/29/22 07:37   IA-2, Autoantibody 0.0 - 7.4 U/mL <5.4      Latest Reference Range & Units 04/14/23 08:17   GFR (CKD-EPI) >60 mL/min/1.73 m 2 70      Latest Reference Range & Units 07/20/23 07:13   Micro Alb Creat Ratio 0 - 30 mg/g see below   Creatinine, Urine mg/dL 74.11   Microalbumin, Urine Random mg/dL <1.2         7. Obesity (BMI at 36.32):  Diet: just went on vacation,   Exercising: riding bike, lifting weights    Patient Tried and failed   Phentermine-Side effect of palpitations  Contrave- side effect of nausea and vomiting   Qsymia - dizziness     2/13/2024  0924 2/16/2024  1136 Most Recent Value     Weight: 98.2 kg (216 lb 6.4 oz) 99.6 kg (219 lb 9.6 oz) 99.6 kg (219 lb 9.6 oz)  as of 2/16/2024    Body Mass Index:   35.44 kg/m² Abnormal   1.676 m (5' 6\")  as of 2/16/2024  99.6 kg (219 lb 9.6 oz)  as of 2/16/2024 4/20/2023   0936 7/27/2023   1047 Most Recent Value     Weight: 96.6 kg (213 lb) 96 kg (211 lb 9.6 oz) 96 kg (211 lb 9.6 oz)  as of 7/27/2023                Body Mass Index:   34.15 kg/m² Abnormal    1.676 m (5' 6\")  as of 7/27/2023   96 kg (211 lb 9.6 oz)  as of 7/27/2023      8.  Dyslipidemia:  Patient is currently not on a statin  She would like to to minimize cholesterol levels with diet and exercise   Latest Reference Range & Units 07/20/23 07:13   Cholesterol,Tot 100 - 199 mg/dL 196   Triglycerides 0 - 149 mg/dL 123   HDL >=40 mg/dL 44   LDL <100 mg/dL 127 (H)       Patient's medications, allergies, and social histories " were reviewed and updated as appropriate.      ROS:     CONS:     No fever, no chills, no weight loss, no fatigue   EYES:      No diplopia, no blurry vision, no redness of eyes, no swelling of eyelids   ENT:    No hearing loss, No ear pain, No sore throat, no dysphagia, no neck swelling   CV:     No chest pain, no palpitations, no claudication, no orthopnea, no PND   PULM:    No SOB, no cough, no hemoptysis, no wheezing    GI:   No nausea, no vomiting, no diarrhea, no constipation, no bloody stools   :  Passing urine well, no dysuria, no hematuria   ENDO:   No polyuria, no polydipsia, no heat intolerance, no cold intolerance   NEURO: No headaches, no dizziness, no convulsions, no tremors   MUSC:  No joint swellings, no arthralgias, no myalgias, no weakness   SKIN:   No rash, no ulcers, no dry skin   PSYCH:   No depression, no anxiety, no difficulty sleeping       Past Medical History:  Patient Active Problem List    Diagnosis Date Noted    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    Other constipation 11/07/2023    Body mass index (BMI) 35.0-35.9, adult 09/27/2022    Vitamin D deficiency 10/27/2020    Post-menopause 10/24/2019    History of colonic polyps 10/22/2019    Osteopenia of multiple sites 10/12/2019    Prediabetes 07/31/2018    Graves disease 04/05/2018    Hyperthyroidism 07/14/2017    Mixed hyperlipidemia 03/01/2017    Essential hypertension 03/01/2017    Primary osteoarthritis of both knees 03/01/2017    Morbid (severe) obesity due to excess calories (HCC)        Past Surgical History:  Past Surgical History:   Procedure Laterality Date    KNEE REPLACEMENT, TOTAL Right 09/27/2018    Approximate date in 2018 [facility]    HYSTEROSCOPY WITH VIDEO DIAGNOSTIC          Allergies:  Patient has no known allergies.     Current Medications:    Current Outpatient Medications:     losartan (COZAAR) 100 MG Tab, TAKE ONE TABLET BY MOUTH EVERY DAY, Disp: 90 Tablet, Rfl: 3    metFORMIN ER  (GLUCOPHAGE XR) 500 MG TABLET SR 24 HR, TAKE 2 TABLETS DAILY, Disp: 180 Tablet, Rfl: 3    methimazole (TAPAZOLE) 5 MG Tab, TAKE ONE-HALF TABLET BY MOUTH EVERY DAY 6 DAYS PER WEEK, Disp: 45 Tablet, Rfl: 2    LINZESS 145 MCG Cap, , Disp: , Rfl:     acetaminophen (TYLENOL) 500 MG Tab, Take 2 Tablets by mouth 1 time a day as needed., Disp: , Rfl:     DIGESTIVE ENZYMES PO, Take  by mouth., Disp: , Rfl:     ascorbic acid (ASCORBIC ACID) 500 MG Tab, Take 1 Tablet by mouth every day., Disp: , Rfl:     vitamin D (CHOLECALCIFEROL) 1000 UNIT Tab, Take 1 Tablet by mouth every day., Disp: , Rfl:     Social History:  Social History     Socioeconomic History    Marital status:      Spouse name: Not on file    Number of children: Not on file    Years of education: Not on file    Highest education level: Bachelor's degree (e.g., BA, AB, BS)   Occupational History    Not on file   Tobacco Use    Smoking status: Former     Current packs/day: 0.00     Average packs/day: 0.5 packs/day for 20.0 years (10.0 ttl pk-yrs)     Types: Cigarettes     Start date: 3/1/1977     Quit date: 3/1/1997     Years since quittin.9    Smokeless tobacco: Never   Vaping Use    Vaping Use: Never used   Substance and Sexual Activity    Alcohol use: Yes     Alcohol/week: 1.2 oz     Types: 2 Glasses of wine per week    Drug use: No    Sexual activity: Yes     Partners: Male   Other Topics Concern    Not on file   Social History Narrative    Not on file     Social Determinants of Health     Financial Resource Strain: Low Risk  (2023)    Overall Financial Resource Strain (CARDIA)     Difficulty of Paying Living Expenses: Not hard at all   Food Insecurity: No Food Insecurity (2/10/2024)    Hunger Vital Sign     Worried About Running Out of Food in the Last Year: Never true     Ran Out of Food in the Last Year: Never true   Transportation Needs: No Transportation Needs (2/10/2024)    PRAPARE - Transportation     Lack of Transportation (Medical): No  "    Lack of Transportation (Non-Medical): No   Physical Activity: Insufficiently Active (2/10/2024)    Exercise Vital Sign     Days of Exercise per Week: 3 days     Minutes of Exercise per Session: 40 min   Stress: Stress Concern Present (2/10/2024)    [employer] - Occupational Stress Questionnaire     Feeling of Stress : To some extent   Social Connections: Unknown (2/10/2024)    Social Connection and Isolation Panel [NHANES]     Frequency of Communication with Friends and Family: Three times a week     Frequency of Social Gatherings with Friends and Family: Once a week     Attends Gnosticism Services: Patient declined     Active Member of Clubs or Organizations: Patient declined     Attends Club or Organization Meetings: Not on file     Marital Status:    Intimate Partner Violence: Not on file   Housing Stability: Unknown (2/10/2024)    Housing Stability Vital Sign     Unable to Pay for Housing in the Last Year: No     Number of Places Lived in the Last Year: Not on file     Unstable Housing in the Last Year: No        Family History:   Family History   Problem Relation Age of Onset    Arthritis Mother     Heart Disease Father         arteriosclerosis    Alcohol/Drug Sister     Alcohol/Drug Brother          PHYSICAL EXAM:   Vital signs: /70 (BP Location: Right arm, Patient Position: Sitting, BP Cuff Size: Large adult)   Pulse 95   Ht 1.676 m (5' 6\")   Wt 99.6 kg (219 lb 9.6 oz)   SpO2 92%   BMI 35.44 kg/m²   GENERAL: Well-developed, well-nourished  in no apparent distress.    SKIN: No rashes, lesions. Turgor is normal.    Labs:    Lab Results   Component Value Date/Time    TSHULTRASEN 0.230 (L) 05/11/2022 1015     Lab Results   Component Value Date/Time    FREET4 1.74 (H) 05/11/2022 1015     Lab Results   Component Value Date/Time    FREET3 3.38 05/11/2022 1015     Imaging:      ASSESSMENT/PLAN:   1. Hyperthyroidism  Clinically stable  Biochemically stable  Continue " same regimen-see HPI  - Comp Metabolic Panel; Future  - TSH; Future  - FREE THYROXINE; Future  - T3 FREE; Future  - methimazole (TAPAZOLE) 5 MG Tab; TAKE ONE-HALF TABLET BY MOUTH EVERY DAY 6 DAYS PER WEEK  Dispense: 45 Tablet; Refill: 2    2. Graves disease  Stable  This is etiology of diagnoses #1       3. Multiple thyroid nodules  Stable  Thyroid ultrasound will be ordered 1/2025    4. Osteopenia after menopause  Stable  Continue with conservative measures   Continue weightbearing exercises  Continue vitamin D supplementation    5. Vitamin D deficiency  Stable  Continue regimen-HPI  - VITAMIN D,25 HYDROXY (DEFICIENCY); Future    6. Prediabetes  Stable  Continue regimen-HPI    - MICROALBUMIN CREAT RATIO URINE; Future  - Comp Metabolic Panel; Future  - POCT Hemoglobin A1C    7. Obesity (BMI 30.0-34.9)  Unstable  Pt is following a healthy diet and exercise routine; however, she is not able to lose or maintain her weight  Patient Tried and failed   Phentermine-Side effect of palpitations  Contrave- side effect of nausea and vomiting   Qsymia - dizziness   Zepbound sent to pharmacy-side effects discussed  - Tirzepatide-Weight Management (ZEPBOUND) 2.5 MG/0.5ML Solution Auto-injector; Inject 2.5 mg under the skin every 7 days.  Dispense: 6 mL; Refill: 11      8. Dyslipidemia  Unstable  Discussed to exercise at least 150 minutes/week  - Lipid Profile; Future     Disposition: Make an appointment to follow-up in 4-5 months  Do your blood work 2 weeks before your next appointment  No biotin 1 week prior to thyroid blood work    Thank you kindly for allowing me to participate in the thyroid care plan for this patient.    Marco A Gracia, A.P.R.N.  02/16/24          CC:   Maryse James M.D.

## 2024-02-21 ENCOUNTER — TELEPHONE (OUTPATIENT)
Dept: ENDOCRINOLOGY | Facility: MEDICAL CENTER | Age: 74
End: 2024-02-21
Payer: MEDICARE

## 2024-02-21 NOTE — TELEPHONE ENCOUNTER
I called [employer] at [phone] and spoke with PA representative Jenni. She provided me with the following information.  Case #: [id number]  PA has been denied: Patient needs to have tried/failed and documented contraindication or adverse affects of all of the following alternatives:  Phentermine  Contrave  Keilyia    Thank you,  Jazmyne Balbuena, [facility]  Endocrinology Pharmacy Coordinator     none

## 2024-02-23 ENCOUNTER — APPOINTMENT (OUTPATIENT)
Dept: RADIOLOGY | Facility: MEDICAL CENTER | Age: 74
End: 2024-02-23
Attending: STUDENT IN AN ORGANIZED HEALTH CARE EDUCATION/TRAINING PROGRAM
Payer: MEDICARE

## 2024-02-23 DIAGNOSIS — Z12.31 VISIT FOR SCREENING MAMMOGRAM: ICD-10-CM

## 2024-02-23 PROCEDURE — 77067 SCR MAMMO BI INCL CAD: CPT

## 2024-02-26 NOTE — TELEPHONE ENCOUNTER
Provider wishes to appeal as patient has tried and failed: Ozempic (SE of constipation), Phentermine (SE of palpitations), Contrave (SE of nausea and vomiting), & Qsymia (SE of dizziness).    We require for patient to sign consent to submit appeal on her behalf. I called patient at [phone] and inquired if she wished to pursue the appeal. Patient agreed to the appeal and stated that she will be coming into the clinic to sign the consent after 1pm today (2/26/24).    Thank you,  Jazmyne Balbuena, [facility]  Endocrinology Pharmacy Coordinator

## 2024-02-26 NOTE — TELEPHONE ENCOUNTER
Patient came into the clinic today (2/26/24) and signed the consent form to submit appeal for the denial of the Zepbound coverage on her behalf. The appeal with additional information was submitted to [employer] via fax at [phone].     Thank you,  Jazmyne Balbuena, [facility]  Endocrinology Pharmacy Coordinator

## 2024-03-07 NOTE — TELEPHONE ENCOUNTER
Received incoming voice message from [employer]. They requested for a call back at [phone] as they needed further information.     I called [phone] and spoke with Alexandr, who stated that she needed to transfer my call to the right team. I was no hold for 28+ minutes and had to disconnect due to another patient request.     Thank you,  Jazmyne Balbuena, [facility]  Endocrinology Pharmacy Coordinator

## 2024-03-15 NOTE — TELEPHONE ENCOUNTER
Zepbound 2.5mg/0.5mL Sol Auto-Inj     Appeal    called [facility] at [phone] for Appeal status.     spoke to Stuart stated they sent over a questionnaire that was not answered and was denied twice. fax was sent to office fax [phone] on 03/11/24.     now a level two appeal has to be done via mail to the [facility] info was on the denial letter. once it is send they will only know if they received it and will not know status until finished time frame is 45 days or less.     I found the denial letter put was missing 2 pages out of 5 . rep will send the denial letter again. It will have info on how to submit the 2nd level appeal via mail to [facility]

## 2024-03-19 NOTE — TELEPHONE ENCOUNTER
Appeal was denied. I advised provider and patient. Patient stated that they will be utilizing a copay card for cash martini.     Thank you,  Jazmyne Balbuena, [facility]  Endocrinology Pharmacy Coordinator

## 2024-03-21 DIAGNOSIS — E66.9 OBESITY (BMI 30.0-34.9): ICD-10-CM

## 2024-03-21 RX ORDER — TIRZEPATIDE 2.5 MG/.5ML
2.5 INJECTION, SOLUTION SUBCUTANEOUS
Qty: 6 ML | Refills: 11 | Status: SHIPPED | OUTPATIENT
Start: 2024-03-21

## 2024-04-16 ENCOUNTER — TELEPHONE (OUTPATIENT)
Dept: MEDICAL GROUP | Facility: LAB | Age: 74
End: 2024-04-16
Payer: MEDICARE

## 2024-04-16 DIAGNOSIS — E05.00 GRAVES DISEASE: ICD-10-CM

## 2024-05-13 ENCOUNTER — HOSPITAL ENCOUNTER (OUTPATIENT)
Dept: LAB | Facility: MEDICAL CENTER | Age: 74
End: 2024-05-13
Payer: MEDICARE

## 2024-05-13 DIAGNOSIS — E55.9 VITAMIN D DEFICIENCY: ICD-10-CM

## 2024-05-13 DIAGNOSIS — R73.03 PREDIABETES: ICD-10-CM

## 2024-05-13 DIAGNOSIS — E78.5 DYSLIPIDEMIA: ICD-10-CM

## 2024-05-13 DIAGNOSIS — E05.90 HYPERTHYROIDISM: ICD-10-CM

## 2024-05-13 LAB
25(OH)D3 SERPL-MCNC: 43 NG/ML (ref 30–100)
ALBUMIN SERPL BCP-MCNC: 4.3 G/DL (ref 3.2–4.9)
ALBUMIN/GLOB SERPL: 1.5 G/DL
ALP SERPL-CCNC: 70 U/L (ref 30–99)
ALT SERPL-CCNC: 14 U/L (ref 2–50)
ANION GAP SERPL CALC-SCNC: 11 MMOL/L (ref 7–16)
AST SERPL-CCNC: 15 U/L (ref 12–45)
BILIRUB SERPL-MCNC: 0.5 MG/DL (ref 0.1–1.5)
BUN SERPL-MCNC: 11 MG/DL (ref 8–22)
CALCIUM ALBUM COR SERPL-MCNC: 9.2 MG/DL (ref 8.5–10.5)
CALCIUM SERPL-MCNC: 9.4 MG/DL (ref 8.5–10.5)
CHLORIDE SERPL-SCNC: 105 MMOL/L (ref 96–112)
CHOLEST SERPL-MCNC: 199 MG/DL (ref 100–199)
CO2 SERPL-SCNC: 25 MMOL/L (ref 20–33)
CREAT SERPL-MCNC: 0.81 MG/DL (ref 0.5–1.4)
CREAT UR-MCNC: 119.76 MG/DL
FASTING STATUS PATIENT QL REPORTED: NORMAL
GFR SERPLBLD CREATININE-BSD FMLA CKD-EPI: 77 ML/MIN/1.73 M 2
GLOBULIN SER CALC-MCNC: 2.8 G/DL (ref 1.9–3.5)
GLUCOSE SERPL-MCNC: 93 MG/DL (ref 65–99)
HDLC SERPL-MCNC: 48 MG/DL
LDLC SERPL CALC-MCNC: 127 MG/DL
MICROALBUMIN UR-MCNC: <1.2 MG/DL
MICROALBUMIN/CREAT UR: NORMAL MG/G (ref 0–30)
POTASSIUM SERPL-SCNC: 4.5 MMOL/L (ref 3.6–5.5)
PROT SERPL-MCNC: 7.1 G/DL (ref 6–8.2)
SODIUM SERPL-SCNC: 141 MMOL/L (ref 135–145)
T3FREE SERPL-MCNC: 2.74 PG/ML (ref 2–4.4)
T4 FREE SERPL-MCNC: 1.35 NG/DL (ref 0.93–1.7)
TRIGL SERPL-MCNC: 120 MG/DL (ref 0–149)
TSH SERPL DL<=0.005 MIU/L-ACNC: 0.87 UIU/ML (ref 0.38–5.33)

## 2024-08-16 ENCOUNTER — HOSPITAL ENCOUNTER (OUTPATIENT)
Dept: LAB | Facility: MEDICAL CENTER | Age: 74
End: 2024-08-16
Payer: MEDICARE

## 2024-08-16 LAB
25(OH)D3 SERPL-MCNC: 42 NG/ML (ref 30–100)
ALBUMIN SERPL BCP-MCNC: 3.9 G/DL (ref 3.2–4.9)
ALBUMIN/GLOB SERPL: 1.3 G/DL
ALP SERPL-CCNC: 71 U/L (ref 30–99)
ALT SERPL-CCNC: 12 U/L (ref 2–50)
ANION GAP SERPL CALC-SCNC: 16 MMOL/L (ref 7–16)
AST SERPL-CCNC: 16 U/L (ref 12–45)
BASOPHILS # BLD AUTO: 0.4 % (ref 0–1.8)
BASOPHILS # BLD: 0.03 K/UL (ref 0–0.12)
BILIRUB SERPL-MCNC: 0.5 MG/DL (ref 0.1–1.5)
BUN SERPL-MCNC: 8 MG/DL (ref 8–22)
CALCIUM ALBUM COR SERPL-MCNC: 9.4 MG/DL (ref 8.5–10.5)
CALCIUM SERPL-MCNC: 9.3 MG/DL (ref 8.5–10.5)
CHLORIDE SERPL-SCNC: 106 MMOL/L (ref 96–112)
CHOLEST SERPL-MCNC: 184 MG/DL (ref 100–199)
CO2 SERPL-SCNC: 21 MMOL/L (ref 20–33)
CREAT SERPL-MCNC: 0.86 MG/DL (ref 0.5–1.4)
EOSINOPHIL # BLD AUTO: 0.31 K/UL (ref 0–0.51)
EOSINOPHIL NFR BLD: 4.2 % (ref 0–6.9)
ERYTHROCYTE [DISTWIDTH] IN BLOOD BY AUTOMATED COUNT: 48.5 FL (ref 35.9–50)
EST. AVERAGE GLUCOSE BLD GHB EST-MCNC: 108 MG/DL
FASTING STATUS PATIENT QL REPORTED: NORMAL
GFR SERPLBLD CREATININE-BSD FMLA CKD-EPI: 71 ML/MIN/1.73 M 2
GLOBULIN SER CALC-MCNC: 2.9 G/DL (ref 1.9–3.5)
GLUCOSE SERPL-MCNC: 95 MG/DL (ref 65–99)
HBA1C MFR BLD: 5.4 % (ref 4–5.6)
HCT VFR BLD AUTO: 47.7 % (ref 37–47)
HDLC SERPL-MCNC: 47 MG/DL
HGB BLD-MCNC: 15.6 G/DL (ref 12–16)
IMM GRANULOCYTES # BLD AUTO: 0.02 K/UL (ref 0–0.11)
IMM GRANULOCYTES NFR BLD AUTO: 0.3 % (ref 0–0.9)
LDLC SERPL CALC-MCNC: 121 MG/DL
LYMPHOCYTES # BLD AUTO: 1.46 K/UL (ref 1–4.8)
LYMPHOCYTES NFR BLD: 20 % (ref 22–41)
MCH RBC QN AUTO: 32.4 PG (ref 27–33)
MCHC RBC AUTO-ENTMCNC: 32.7 G/DL (ref 32.2–35.5)
MCV RBC AUTO: 99 FL (ref 81.4–97.8)
MONOCYTES # BLD AUTO: 0.58 K/UL (ref 0–0.85)
MONOCYTES NFR BLD AUTO: 7.9 % (ref 0–13.4)
NEUTROPHILS # BLD AUTO: 4.9 K/UL (ref 1.82–7.42)
NEUTROPHILS NFR BLD: 67.2 % (ref 44–72)
NRBC # BLD AUTO: 0 K/UL
NRBC BLD-RTO: 0 /100 WBC (ref 0–0.2)
PHOSPHATE SERPL-MCNC: 3.5 MG/DL (ref 2.5–4.5)
PLATELET # BLD AUTO: 223 K/UL (ref 164–446)
PMV BLD AUTO: 11 FL (ref 9–12.9)
POTASSIUM SERPL-SCNC: 4.6 MMOL/L (ref 3.6–5.5)
PROT SERPL-MCNC: 6.8 G/DL (ref 6–8.2)
PTH-INTACT SERPL-MCNC: 63.2 PG/ML (ref 14–72)
RBC # BLD AUTO: 4.82 M/UL (ref 4.2–5.4)
SODIUM SERPL-SCNC: 143 MMOL/L (ref 135–145)
T3FREE SERPL-MCNC: 2.89 PG/ML (ref 2–4.4)
T4 FREE SERPL-MCNC: 1.33 NG/DL (ref 0.93–1.7)
TRIGL SERPL-MCNC: 82 MG/DL (ref 0–149)
TSH SERPL-ACNC: 0.76 UIU/ML (ref 0.35–5.5)
VIT B12 SERPL-MCNC: 529 PG/ML (ref 211–911)
WBC # BLD AUTO: 7.3 K/UL (ref 4.8–10.8)

## 2024-08-16 PROCEDURE — 80053 COMPREHEN METABOLIC PANEL: CPT

## 2024-08-16 PROCEDURE — 84443 ASSAY THYROID STIM HORMONE: CPT

## 2024-08-16 PROCEDURE — 84481 FREE ASSAY (FT-3): CPT

## 2024-08-16 PROCEDURE — 84100 ASSAY OF PHOSPHORUS: CPT

## 2024-08-16 PROCEDURE — 85025 COMPLETE CBC W/AUTO DIFF WBC: CPT

## 2024-08-16 PROCEDURE — 84439 ASSAY OF FREE THYROXINE: CPT

## 2024-08-16 PROCEDURE — 82607 VITAMIN B-12: CPT

## 2024-08-16 PROCEDURE — 83036 HEMOGLOBIN GLYCOSYLATED A1C: CPT | Mod: GA

## 2024-08-16 PROCEDURE — 82306 VITAMIN D 25 HYDROXY: CPT

## 2024-08-16 PROCEDURE — 36415 COLL VENOUS BLD VENIPUNCTURE: CPT | Mod: GA

## 2024-08-16 PROCEDURE — 83970 ASSAY OF PARATHORMONE: CPT

## 2024-08-16 PROCEDURE — 80061 LIPID PANEL: CPT

## 2024-09-09 DIAGNOSIS — I10 ESSENTIAL HYPERTENSION: ICD-10-CM

## 2024-09-09 RX ORDER — LOSARTAN POTASSIUM 100 MG/1
100 TABLET ORAL
Qty: 30 TABLET | Refills: 0 | Status: SHIPPED | OUTPATIENT
Start: 2024-09-09

## 2024-11-04 PROBLEM — M17.9 OSTEOARTHRITIS, KNEE: Status: ACTIVE | Noted: 2024-11-04

## 2024-11-04 ASSESSMENT — FIBROSIS 4 INDEX: FIB4 SCORE: 1.51

## 2024-11-06 DIAGNOSIS — I10 ESSENTIAL HYPERTENSION: ICD-10-CM

## 2024-11-06 RX ORDER — LOSARTAN POTASSIUM 100 MG/1
TABLET ORAL
Qty: 90 TABLET | Refills: 0 | Status: SHIPPED | OUTPATIENT
Start: 2024-11-06

## 2024-11-06 NOTE — TELEPHONE ENCOUNTER
Received request via: Pharmacy    Was the patient seen in the last year in this department? Yes    Does the patient have an active prescription (recently filled or refills available) for medication(s) requested? No    Pharmacy Name: [facility]    Does the patient have FPC Plus and need 100-day supply? (This applies to ALL medications) Patient does not have SCP

## 2024-11-21 PROBLEM — M17.12 PRIMARY OSTEOARTHRITIS OF LEFT KNEE: Status: ACTIVE | Noted: 2024-11-04

## 2024-11-26 ENCOUNTER — HOSPITAL ENCOUNTER (OUTPATIENT)
Dept: LAB | Facility: MEDICAL CENTER | Age: 74
End: 2024-11-26
Attending: INTERNAL MEDICINE
Payer: MEDICARE

## 2024-11-26 LAB
T3FREE SERPL-MCNC: 3.09 PG/ML (ref 2–4.4)
T4 FREE SERPL-MCNC: 1.37 NG/DL (ref 0.93–1.7)
TSH SERPL-ACNC: 0.82 UIU/ML (ref 0.35–5.5)

## 2024-11-26 PROCEDURE — 84481 FREE ASSAY (FT-3): CPT

## 2024-11-26 PROCEDURE — 84439 ASSAY OF FREE THYROXINE: CPT

## 2024-11-26 PROCEDURE — 84443 ASSAY THYROID STIM HORMONE: CPT

## 2024-11-26 PROCEDURE — 36415 COLL VENOUS BLD VENIPUNCTURE: CPT

## 2024-12-04 ENCOUNTER — HOSPITAL ENCOUNTER (OUTPATIENT)
Dept: RADIOLOGY | Facility: MEDICAL CENTER | Age: 74
End: 2024-12-04
Attending: ORTHOPAEDIC SURGERY
Payer: MEDICARE

## 2024-12-04 DIAGNOSIS — M17.12 PRIMARY OSTEOARTHRITIS OF LEFT KNEE: ICD-10-CM

## 2024-12-04 PROCEDURE — 73700 CT LOWER EXTREMITY W/O DYE: CPT | Mod: LT

## 2024-12-09 ENCOUNTER — PRE-ADMISSION TESTING (OUTPATIENT)
Dept: ADMISSIONS | Facility: MEDICAL CENTER | Age: 74
End: 2024-12-09
Attending: ORTHOPAEDIC SURGERY
Payer: MEDICARE

## 2024-12-09 DIAGNOSIS — Z01.810 PRE-OPERATIVE CARDIOVASCULAR EXAMINATION: ICD-10-CM

## 2024-12-09 DIAGNOSIS — Z01.812 PRE-OPERATIVE LABORATORY EXAMINATION: ICD-10-CM

## 2024-12-09 LAB
ANION GAP SERPL CALC-SCNC: 11 MMOL/L (ref 7–16)
BUN SERPL-MCNC: 13 MG/DL (ref 8–22)
CALCIUM SERPL-MCNC: 10 MG/DL (ref 8.4–10.2)
CHLORIDE SERPL-SCNC: 100 MMOL/L (ref 96–112)
CO2 SERPL-SCNC: 26 MMOL/L (ref 20–33)
CREAT SERPL-MCNC: 0.9 MG/DL (ref 0.5–1.4)
ERYTHROCYTE [DISTWIDTH] IN BLOOD BY AUTOMATED COUNT: 45.1 FL (ref 35.9–50)
EST. AVERAGE GLUCOSE BLD GHB EST-MCNC: 126 MG/DL
GFR SERPLBLD CREATININE-BSD FMLA CKD-EPI: 67 ML/MIN/1.73 M 2
GLUCOSE SERPL-MCNC: 89 MG/DL (ref 65–99)
HBA1C MFR BLD: 6 % (ref 4–5.6)
HCT VFR BLD AUTO: 48.1 % (ref 37–47)
HGB BLD-MCNC: 15.8 G/DL (ref 12–16)
MCH RBC QN AUTO: 32.2 PG (ref 27–33)
MCHC RBC AUTO-ENTMCNC: 32.8 G/DL (ref 32.2–35.5)
MCV RBC AUTO: 98 FL (ref 81.4–97.8)
PLATELET # BLD AUTO: 253 K/UL (ref 164–446)
PMV BLD AUTO: 10.4 FL (ref 9–12.9)
POTASSIUM SERPL-SCNC: 4.5 MMOL/L (ref 3.6–5.5)
RBC # BLD AUTO: 4.91 M/UL (ref 4.2–5.4)
SODIUM SERPL-SCNC: 137 MMOL/L (ref 135–145)
WBC # BLD AUTO: 6.7 K/UL (ref 4.8–10.8)

## 2024-12-09 PROCEDURE — 80048 BASIC METABOLIC PNL TOTAL CA: CPT

## 2024-12-09 PROCEDURE — 83036 HEMOGLOBIN GLYCOSYLATED A1C: CPT

## 2024-12-09 PROCEDURE — 87640 STAPH A DNA AMP PROBE: CPT | Mod: XU

## 2024-12-09 PROCEDURE — 93005 ELECTROCARDIOGRAM TRACING: CPT | Mod: TC

## 2024-12-09 PROCEDURE — 87641 MR-STAPH DNA AMP PROBE: CPT

## 2024-12-09 PROCEDURE — 36415 COLL VENOUS BLD VENIPUNCTURE: CPT

## 2024-12-09 PROCEDURE — 85027 COMPLETE CBC AUTOMATED: CPT

## 2024-12-09 RX ORDER — LINACLOTIDE 290 UG/1
CAPSULE, GELATIN COATED ORAL
COMMUNITY
Start: 2024-11-24 | End: 2025-01-16

## 2024-12-09 NOTE — PREPROCEDURE INSTRUCTIONS
" Reviewed dietary highlights of \"Preparing for your procedure\"  Given \"Preparing for your Procedure\", \"fasting\", \"Pain management\", \"Patient Safety\", \"Speak-up\" handouts and map. Agreed with surgical site listed on consent.   "

## 2024-12-09 NOTE — DISCHARGE PLANNING
DISCHARGE PLANNING NOTE - TOTAL JOINT    Procedure: Robotic Left Total Knee Arthroplasty  Procedure Date: 1/7/2025  Insurance: Payor: MEDICARE / Plan: MEDICARE PART A & B / Product Type: *No Product type* /    Equipment currently available at home?  four-wheel walker and shower chair, Hand held shower head  Steps into the home? 2  Steps within the home? 14 (master is downstairs)  Toilet height? Standard  Type of shower? walk-in shower  Home Oxygen? No  Portable tank?    Oxygen Provider:  Planning same day discharge: Yes    Is Outpatient Physical Therapy set up after surgery? Yes  Did you take the Total Joint Class and where? Yes, received [facility] book.  Who will be your transportation home on day of discharge? Ernesto-     Have you made arrangements to have someone stay with you at home for the first 3 days following discharge, and if so, whom? Ernesto-    Have you notified your surgeon that you do not have transportation or someone to help you after discharge? N/A    Are you planning on going to a transitional care facility, for example a skilled nursing facility, post operatively for rehab, and if so, have you contacted your insurance plan to see if they cover this? No    Met with the pt. Pt given a copy of Home Safety Checklist and the Equipment Resource Guide. Expected process in Recovery Room and dc criteria discussed with pt. All questions answered and verbalizes understanding of all instructions. No dc needs identified at this time. Anticipate dc to home without barriers.

## 2024-12-10 LAB
EKG IMPRESSION: NORMAL
SCCMEC + MECA PNL NOSE NAA+PROBE: NEGATIVE
SCCMEC + MECA PNL NOSE NAA+PROBE: NEGATIVE

## 2024-12-10 PROCEDURE — 93010 ELECTROCARDIOGRAM REPORT: CPT | Performed by: INTERNAL MEDICINE

## 2025-01-06 ENCOUNTER — ANESTHESIA EVENT (OUTPATIENT)
Dept: SURGERY | Facility: MEDICAL CENTER | Age: 75
End: 2025-01-06
Payer: MEDICARE

## 2025-01-06 PROCEDURE — RXMED WILLOW AMBULATORY MEDICATION CHARGE: Performed by: STUDENT IN AN ORGANIZED HEALTH CARE EDUCATION/TRAINING PROGRAM

## 2025-01-06 RX ORDER — MELOXICAM 7.5 MG/1
7.5 TABLET ORAL DAILY
Qty: 30 TABLET | Refills: 0 | Status: ON HOLD | OUTPATIENT
Start: 2025-01-07 | End: 2025-01-20

## 2025-01-06 RX ORDER — DOCUSATE SODIUM 100 MG/1
100 CAPSULE, LIQUID FILLED ORAL 2 TIMES DAILY
Qty: 60 CAPSULE | Refills: 0 | Status: SHIPPED | OUTPATIENT
Start: 2025-01-07 | End: 2025-01-16

## 2025-01-06 RX ORDER — ASPIRIN 81 MG/1
81 TABLET, CHEWABLE ORAL 2 TIMES DAILY
Qty: 56 TABLET | Refills: 0 | Status: ON HOLD
Start: 2025-01-06 | End: 2025-01-20

## 2025-01-06 RX ORDER — OXYCODONE HYDROCHLORIDE 5 MG/1
5-7.5 TABLET ORAL EVERY 6 HOURS PRN
Qty: 42 TABLET | Refills: 0 | Status: SHIPPED | OUTPATIENT
Start: 2025-01-07 | End: 2025-01-14

## 2025-01-06 RX ORDER — TRAMADOL HYDROCHLORIDE 50 MG/1
50-75 TABLET ORAL EVERY 6 HOURS PRN
Qty: 35 TABLET | Refills: 0 | Status: SHIPPED | OUTPATIENT
Start: 2025-01-07 | End: 2025-01-14

## 2025-01-06 NOTE — DISCHARGE INSTRUCTIONS
Patient will be discharged home and follow up with Dr. Santa in 2 weeks, for which the patient already has scheduled.    You may call or text Dr. Santa' medical assistant during business hours at [phone].  You may call the emergency CHRISTY line during nights/weekends/holidays if needed at [phone].    Instructions:  -Leave the bandage in place until your followup appointment in 2 weeks.  -May shower with bandage in place, if bandage becomes soaked underneath please remove and call the office immediately.  -No baths/tubs/pools/submersion of the wound for now.  -Call if there is significant drainage beneath the bandage    -Put as much weight as comfortable on the operative leg.  Use a walker to assist with balance to avoid any falls.  -It is important to start moving and stretching right away, otherwise the joint can stiffen.    -Take your blood clot prevention medication for 4 weeks after surgery (81 mg of Aspirin, twice daily).  -Use ice frequently to help with pain and swelling control  -Pain management:  Standard pain regimen should be:  Ice as much as possible.  Apply the ice anywhere you feel pain.  Meloxicam (anti-inflammatory)- 7.5mg one tablet every day.  Take this automatically on a scheduled basis.  Do not take any other NSAIDs while taking this.  Tylenol (acetaminophen) - 1000mg every 8 hours. Take this automatically on a scheduled basis.  Tramadol - 1-2 capsules every 6 hours. Take this automatically on a scheduled basis until no longer needed for pain.  Oxycodone - 1-2 tablets every 6 hours only if needed.  You would take the oxycodone 3 hours after the Tramadol, such that you are taking one or the other every 3 hours        -Try to limit the amount of oxycodone since it tends to cause constipation, drowsiness, etc.  But if you need it, take it.        -100mg of Colace (docusate) will be prescribed. Take this twice a day while still taking Tramadol or Oxycodone. Can discontinue after opiates  are no longer being taken.  If bowel movement has not occurred in 48 hours please add in a laxative called Senna (over the counter) twice daily in conjunction with the colace. If no bowel movement is produced 48 hours after adding in Senna please start Milk of Magnesia (over the counter).     AGGRESSIVE RANGE OF MOTION.  Do a quad set a million times per day.  Have a family member push down on the thigh and shin every 20 minutes with 2-3 pillows under the heel to help stretch the knee straight.  Flex the knee as far as possible every 20 minutes.         ACTIVITY: Rest and take it easy for the first 24 hours.  A responsible adult is recommended to remain with you during that time.  It is normal to feel sleepy.  We encourage you to not do anything that requires balance, judgment or coordination.    MILD FLU-LIKE SYMPTOMS ARE NORMAL. YOU MAY EXPERIENCE GENERALIZED MUSCLE ACHES, THROAT IRRITATION, HEADACHE AND/OR SOME NAUSEA.    FOR 24 HOURS DO NOT:  Drive, operate machinery or run household appliances.  Drink beer or alcoholic beverages.   Make important decisions or sign legal documents.    SPECIAL INSTRUCTIONS:     Peripheral Nerve Block Discharge Instructions from Same Day Surgery and Inpatient :    What to Expect - Lower Extremity  The block may cause you to experience numbness and weakness in your thigh and knee or calf and foot on the same side as your surgery  Numbness, tingling and / or weakness are all normal. For some people, this may be an unpleasant sensation  These issues will be resolved when the local anesthetic wears off   You may experience numbness and tingling in your thigh on the same side as your surgery if the block medicine was injected at your groin area  Numbness will make it difficult to walk  You may have problems with balance and walking so be very careful   Follow your surgeon's direction regarding weight bearing on your surgical limb  Be very careful with your numb limb  Precautions  The  "numbness may affect your balance  Be careful when walking or moving around  Your leg may be weak: be very careful putting weight on it  If your surgeon did not specify a time, you should not bear weight for 24 hours  Be sure to ask for help when you need it  It is better to have help than to fall and hurt yourself  Prevent Injury  Protect the limb like a baby  Beware of exposing your limb to extreme heat or cold or trauma  The limb may be injured without you noticing because it is numb  Keep the limb elevated whenever possible  Do not sleep on the limb  Change the position of the limb regularly  Avoid putting pressure on your surgical limb  Pain Control  The initial block on the day of surgery will make your extremity feel \"numb\"  Any consecutive injection including prior to discharge from the hospital will make your extremity feel \"numb\"  You may feel an aching or burning when the local anesthesia starts to wear off  Take pain pills as prescribed by your surgeon  Call your surgeon or anesthesiologist if you do not have adequate pain control      DIET: To avoid nausea, slowly advance diet as tolerated, avoiding spicy or greasy foods for the first day.  Add more substantial food to your diet according to your physician's instructions.    INCREASE FLUIDS AND FIBER TO AVOID CONSTIPATION.    You should CALL YOUR PHYSICIAN if you develop:  Fever greater than 101 degrees F.  Pain not relieved by medication, or persistent nausea or vomiting.  Excessive bleeding (blood soaking through dressing) or unexpected drainage from the wound.  Extreme redness or swelling around the incision site, drainage of pus or foul smelling drainage.  Inability to urinate or empty your bladder within 8 hours.  Problems with breathing or chest pain.    You should call 911 if you develop problems with breathing or chest pain.  If you are unable to contact your doctor or surgical center, you should go to the nearest emergency room or urgent care " center.  Physician's telephone #: [phone]    If any questions arise, call your doctor.  If your doctor is not available, please feel free to call the Surgical Center at [phone].     A registered nurse may call you a few days after your surgery to see how you are doing after your procedure.    MEDICATIONS: Resume taking daily medication.  Take prescribed pain medication with food.  If no medication is prescribed, you may take non-aspirin pain medication if needed.  PAIN MEDICATION CAN BE VERY CONSTIPATING.  Take a stool softener or laxative such as senokot, pericolace, or milk of magnesia if needed.     Last pain medication given at ______________________.    If your physician has prescribed pain medication that includes Acetaminophen (Tylenol), do not take additional Acetaminophen (Tylenol) while taking the prescribed medication.

## 2025-01-07 ENCOUNTER — ANESTHESIA (OUTPATIENT)
Dept: SURGERY | Facility: MEDICAL CENTER | Age: 75
End: 2025-01-07
Payer: MEDICARE

## 2025-01-07 ENCOUNTER — PHARMACY VISIT (OUTPATIENT)
Dept: PHARMACY | Facility: MEDICAL CENTER | Age: 75
End: 2025-01-07
Payer: COMMERCIAL

## 2025-01-07 ENCOUNTER — HOSPITAL ENCOUNTER (OUTPATIENT)
Facility: MEDICAL CENTER | Age: 75
End: 2025-01-07
Attending: ORTHOPAEDIC SURGERY | Admitting: ORTHOPAEDIC SURGERY
Payer: MEDICARE

## 2025-01-07 VITALS
HEART RATE: 84 BPM | OXYGEN SATURATION: 96 % | DIASTOLIC BLOOD PRESSURE: 87 MMHG | RESPIRATION RATE: 16 BRPM | WEIGHT: 212.41 LBS | HEIGHT: 66 IN | TEMPERATURE: 97.5 F | SYSTOLIC BLOOD PRESSURE: 137 MMHG | BODY MASS INDEX: 34.14 KG/M2

## 2025-01-07 DIAGNOSIS — G89.18 POST-OPERATIVE PAIN: ICD-10-CM

## 2025-01-07 DIAGNOSIS — M17.12 PRIMARY OSTEOARTHRITIS OF LEFT KNEE: ICD-10-CM

## 2025-01-07 PROBLEM — R11.2 PONV (POSTOPERATIVE NAUSEA AND VOMITING): Status: ACTIVE | Noted: 2025-01-07

## 2025-01-07 PROBLEM — Z98.890 PONV (POSTOPERATIVE NAUSEA AND VOMITING): Status: ACTIVE | Noted: 2025-01-07

## 2025-01-07 PROCEDURE — 700111 HCHG RX REV CODE 636 W/ 250 OVERRIDE (IP): Performed by: ANESTHESIOLOGY

## 2025-01-07 PROCEDURE — 20985 CPTR-ASST DIR MS PX: CPT | Performed by: ORTHOPAEDIC SURGERY

## 2025-01-07 PROCEDURE — 20985 CPTR-ASST DIR MS PX: CPT | Mod: ASROC | Performed by: STUDENT IN AN ORGANIZED HEALTH CARE EDUCATION/TRAINING PROGRAM

## 2025-01-07 PROCEDURE — C1713 ANCHOR/SCREW BN/BN,TIS/BN: HCPCS | Performed by: ORTHOPAEDIC SURGERY

## 2025-01-07 PROCEDURE — 502714 HCHG ROBOTIC SURGERY SERVICES: Performed by: ORTHOPAEDIC SURGERY

## 2025-01-07 PROCEDURE — 97163 PT EVAL HIGH COMPLEX 45 MIN: CPT

## 2025-01-07 PROCEDURE — 160009 HCHG ANES TIME/MIN: Performed by: ORTHOPAEDIC SURGERY

## 2025-01-07 PROCEDURE — 160046 HCHG PACU - 1ST 60 MINS PHASE II: Performed by: ORTHOPAEDIC SURGERY

## 2025-01-07 PROCEDURE — 160042 HCHG SURGERY MINUTES - EA ADDL 1 MIN LEVEL 5: Performed by: ORTHOPAEDIC SURGERY

## 2025-01-07 PROCEDURE — 160031 HCHG SURGERY MINUTES - 1ST 30 MINS LEVEL 5: Performed by: ORTHOPAEDIC SURGERY

## 2025-01-07 PROCEDURE — 700101 HCHG RX REV CODE 250: Performed by: ORTHOPAEDIC SURGERY

## 2025-01-07 PROCEDURE — 160036 HCHG PACU - EA ADDL 30 MINS PHASE I: Performed by: ORTHOPAEDIC SURGERY

## 2025-01-07 PROCEDURE — 27447 TOTAL KNEE ARTHROPLASTY: CPT | Mod: ASROC,LT | Performed by: STUDENT IN AN ORGANIZED HEALTH CARE EDUCATION/TRAINING PROGRAM

## 2025-01-07 PROCEDURE — 700111 HCHG RX REV CODE 636 W/ 250 OVERRIDE (IP): Performed by: ORTHOPAEDIC SURGERY

## 2025-01-07 PROCEDURE — 64447 NJX AA&/STRD FEMORAL NRV IMG: CPT | Performed by: ORTHOPAEDIC SURGERY

## 2025-01-07 PROCEDURE — A9270 NON-COVERED ITEM OR SERVICE: HCPCS | Performed by: ANESTHESIOLOGY

## 2025-01-07 PROCEDURE — 160035 HCHG PACU - 1ST 60 MINS PHASE I: Performed by: ORTHOPAEDIC SURGERY

## 2025-01-07 PROCEDURE — RXMED WILLOW AMBULATORY MEDICATION CHARGE: Performed by: STUDENT IN AN ORGANIZED HEALTH CARE EDUCATION/TRAINING PROGRAM

## 2025-01-07 PROCEDURE — 27447 TOTAL KNEE ARTHROPLASTY: CPT | Mod: LT | Performed by: ORTHOPAEDIC SURGERY

## 2025-01-07 PROCEDURE — 97535 SELF CARE MNGMENT TRAINING: CPT

## 2025-01-07 PROCEDURE — 160048 HCHG OR STATISTICAL LEVEL 1-5: Performed by: ORTHOPAEDIC SURGERY

## 2025-01-07 PROCEDURE — 160025 RECOVERY II MINUTES (STATS): Performed by: ORTHOPAEDIC SURGERY

## 2025-01-07 PROCEDURE — 160002 HCHG RECOVERY MINUTES (STAT): Performed by: ORTHOPAEDIC SURGERY

## 2025-01-07 PROCEDURE — 700105 HCHG RX REV CODE 258: Performed by: ORTHOPAEDIC SURGERY

## 2025-01-07 PROCEDURE — 700102 HCHG RX REV CODE 250 W/ 637 OVERRIDE(OP): Performed by: ANESTHESIOLOGY

## 2025-01-07 PROCEDURE — C1776 JOINT DEVICE (IMPLANTABLE): HCPCS | Performed by: ORTHOPAEDIC SURGERY

## 2025-01-07 PROCEDURE — 700111 HCHG RX REV CODE 636 W/ 250 OVERRIDE (IP): Mod: JZ | Performed by: ORTHOPAEDIC SURGERY

## 2025-01-07 PROCEDURE — 700101 HCHG RX REV CODE 250: Performed by: ANESTHESIOLOGY

## 2025-01-07 DEVICE — COMPONENT FEMORAL TRIATHLON CRUCIATE RETAIN LEFT SIZE 3 (1EA): Type: IMPLANTABLE DEVICE | Site: KNEE | Status: FUNCTIONAL

## 2025-01-07 DEVICE — INSERT TIBIAL TRIATHLON X3 CONDYLAR STABILIZE SIZE 4 11MM (1EA): Type: IMPLANTABLE DEVICE | Site: KNEE | Status: FUNCTIONAL

## 2025-01-07 DEVICE — COMPONENT PATELLAR TRIATHLON TRITANIUM ASYMMETRIC METAL BACKED PATELLA 35X10MM (1EA): Type: IMPLANTABLE DEVICE | Site: KNEE | Status: FUNCTIONAL

## 2025-01-07 DEVICE — BASEPLATE TIBIAL TRIATHLON TRITANIUM SIZE 4 (1EA): Type: IMPLANTABLE DEVICE | Site: KNEE | Status: FUNCTIONAL

## 2025-01-07 RX ORDER — EPHEDRINE SULFATE 50 MG/ML
5 INJECTION, SOLUTION INTRAVENOUS
Status: DISCONTINUED | OUTPATIENT
Start: 2025-01-07 | End: 2025-01-07 | Stop reason: HOSPADM

## 2025-01-07 RX ORDER — HYDROMORPHONE HYDROCHLORIDE 1 MG/ML
0.5 INJECTION, SOLUTION INTRAMUSCULAR; INTRAVENOUS; SUBCUTANEOUS
Status: CANCELLED | OUTPATIENT
Start: 2025-01-07

## 2025-01-07 RX ORDER — ACETAMINOPHEN 500 MG
1000 TABLET ORAL EVERY 6 HOURS PRN
Status: CANCELLED | OUTPATIENT
Start: 2025-01-12

## 2025-01-07 RX ORDER — HYDROMORPHONE HYDROCHLORIDE 1 MG/ML
0.4 INJECTION, SOLUTION INTRAMUSCULAR; INTRAVENOUS; SUBCUTANEOUS
Status: DISCONTINUED | OUTPATIENT
Start: 2025-01-07 | End: 2025-01-07 | Stop reason: HOSPADM

## 2025-01-07 RX ORDER — DIPHENHYDRAMINE HYDROCHLORIDE 50 MG/ML
12.5 INJECTION INTRAMUSCULAR; INTRAVENOUS
Status: DISCONTINUED | OUTPATIENT
Start: 2025-01-07 | End: 2025-01-07 | Stop reason: HOSPADM

## 2025-01-07 RX ORDER — ONDANSETRON 2 MG/ML
4 INJECTION INTRAMUSCULAR; INTRAVENOUS EVERY 4 HOURS PRN
Status: CANCELLED | OUTPATIENT
Start: 2025-01-07

## 2025-01-07 RX ORDER — VANCOMYCIN HYDROCHLORIDE 1 G/20ML
INJECTION, POWDER, LYOPHILIZED, FOR SOLUTION INTRAVENOUS
Status: COMPLETED | OUTPATIENT
Start: 2025-01-07 | End: 2025-01-07

## 2025-01-07 RX ORDER — ROPIVACAINE HYDROCHLORIDE 5 MG/ML
INJECTION, SOLUTION EPIDURAL; INFILTRATION; PERINEURAL
Status: DISCONTINUED | OUTPATIENT
Start: 2025-01-07 | End: 2025-01-07 | Stop reason: HOSPADM

## 2025-01-07 RX ORDER — DIPHENHYDRAMINE HYDROCHLORIDE 50 MG/ML
25 INJECTION INTRAMUSCULAR; INTRAVENOUS EVERY 6 HOURS PRN
Status: CANCELLED | OUTPATIENT
Start: 2025-01-07

## 2025-01-07 RX ORDER — TRAMADOL HYDROCHLORIDE 50 MG/1
50 TABLET ORAL EVERY 4 HOURS PRN
Status: CANCELLED | OUTPATIENT
Start: 2025-01-07

## 2025-01-07 RX ORDER — LABETALOL HYDROCHLORIDE 5 MG/ML
5 INJECTION, SOLUTION INTRAVENOUS
Status: DISCONTINUED | OUTPATIENT
Start: 2025-01-07 | End: 2025-01-07 | Stop reason: HOSPADM

## 2025-01-07 RX ORDER — HALOPERIDOL 5 MG/ML
1 INJECTION INTRAMUSCULAR
Status: DISCONTINUED | OUTPATIENT
Start: 2025-01-07 | End: 2025-01-07 | Stop reason: HOSPADM

## 2025-01-07 RX ORDER — ACETAMINOPHEN 500 MG
1000 TABLET ORAL ONCE
Status: COMPLETED | OUTPATIENT
Start: 2025-01-07 | End: 2025-01-07

## 2025-01-07 RX ORDER — IBUPROFEN 400 MG/1
800 TABLET, FILM COATED ORAL 3 TIMES DAILY PRN
Status: CANCELLED | OUTPATIENT
Start: 2025-01-10

## 2025-01-07 RX ORDER — KETOROLAC TROMETHAMINE 30 MG/ML
INJECTION, SOLUTION INTRAMUSCULAR; INTRAVENOUS
Status: DISCONTINUED | OUTPATIENT
Start: 2025-01-07 | End: 2025-01-07 | Stop reason: HOSPADM

## 2025-01-07 RX ORDER — ROCURONIUM BROMIDE 10 MG/ML
INJECTION, SOLUTION INTRAVENOUS PRN
Status: DISCONTINUED | OUTPATIENT
Start: 2025-01-07 | End: 2025-01-07 | Stop reason: SURG

## 2025-01-07 RX ORDER — BISACODYL 10 MG
10 SUPPOSITORY, RECTAL RECTAL
Status: CANCELLED | OUTPATIENT
Start: 2025-01-07

## 2025-01-07 RX ORDER — DIPHENHYDRAMINE HCL 25 MG
25 TABLET ORAL EVERY 6 HOURS PRN
Status: CANCELLED | OUTPATIENT
Start: 2025-01-07

## 2025-01-07 RX ORDER — ONDANSETRON 2 MG/ML
4 INJECTION INTRAMUSCULAR; INTRAVENOUS
Status: DISCONTINUED | OUTPATIENT
Start: 2025-01-07 | End: 2025-01-07 | Stop reason: HOSPADM

## 2025-01-07 RX ORDER — HALOPERIDOL 5 MG/ML
1 INJECTION INTRAMUSCULAR EVERY 6 HOURS PRN
Status: CANCELLED | OUTPATIENT
Start: 2025-01-07

## 2025-01-07 RX ORDER — HYDRALAZINE HYDROCHLORIDE 20 MG/ML
5 INJECTION INTRAMUSCULAR; INTRAVENOUS
Status: DISCONTINUED | OUTPATIENT
Start: 2025-01-07 | End: 2025-01-07 | Stop reason: HOSPADM

## 2025-01-07 RX ORDER — SODIUM CHLORIDE, SODIUM LACTATE, POTASSIUM CHLORIDE, CALCIUM CHLORIDE 600; 310; 30; 20 MG/100ML; MG/100ML; MG/100ML; MG/100ML
INJECTION, SOLUTION INTRAVENOUS CONTINUOUS
Status: DISCONTINUED | OUTPATIENT
Start: 2025-01-07 | End: 2025-01-07 | Stop reason: HOSPADM

## 2025-01-07 RX ORDER — SODIUM CHLORIDE 9 MG/ML
INJECTION, SOLUTION INTRAMUSCULAR; INTRAVENOUS; SUBCUTANEOUS
Status: DISCONTINUED | OUTPATIENT
Start: 2025-01-07 | End: 2025-01-07 | Stop reason: HOSPADM

## 2025-01-07 RX ORDER — ONDANSETRON 2 MG/ML
INJECTION INTRAMUSCULAR; INTRAVENOUS PRN
Status: DISCONTINUED | OUTPATIENT
Start: 2025-01-07 | End: 2025-01-07 | Stop reason: SURG

## 2025-01-07 RX ORDER — ROPIVACAINE HYDROCHLORIDE 5 MG/ML
INJECTION, SOLUTION EPIDURAL; INFILTRATION; PERINEURAL
Status: COMPLETED | OUTPATIENT
Start: 2025-01-07 | End: 2025-01-07

## 2025-01-07 RX ORDER — AMOXICILLIN 250 MG
1 CAPSULE ORAL
Status: CANCELLED | OUTPATIENT
Start: 2025-01-07

## 2025-01-07 RX ORDER — ESMOLOL HYDROCHLORIDE 10 MG/ML
INJECTION INTRAVENOUS PRN
Status: DISCONTINUED | OUTPATIENT
Start: 2025-01-07 | End: 2025-01-07 | Stop reason: SURG

## 2025-01-07 RX ORDER — MAGNESIUM SULFATE HEPTAHYDRATE 40 MG/ML
INJECTION, SOLUTION INTRAVENOUS PRN
Status: DISCONTINUED | OUTPATIENT
Start: 2025-01-07 | End: 2025-01-07 | Stop reason: SURG

## 2025-01-07 RX ORDER — DEXAMETHASONE SODIUM PHOSPHATE 4 MG/ML
4 INJECTION, SOLUTION INTRA-ARTICULAR; INTRALESIONAL; INTRAMUSCULAR; INTRAVENOUS; SOFT TISSUE
Status: CANCELLED | OUTPATIENT
Start: 2025-01-07

## 2025-01-07 RX ORDER — ASPIRIN 81 MG/1
81 TABLET ORAL 2 TIMES DAILY
Status: CANCELLED | OUTPATIENT
Start: 2025-01-07

## 2025-01-07 RX ORDER — LIDOCAINE HYDROCHLORIDE 20 MG/ML
INJECTION, SOLUTION EPIDURAL; INFILTRATION; INTRACAUDAL; PERINEURAL PRN
Status: DISCONTINUED | OUTPATIENT
Start: 2025-01-07 | End: 2025-01-07 | Stop reason: SURG

## 2025-01-07 RX ORDER — ALBUTEROL SULFATE 5 MG/ML
2.5 SOLUTION RESPIRATORY (INHALATION)
Status: DISCONTINUED | OUTPATIENT
Start: 2025-01-07 | End: 2025-01-07 | Stop reason: HOSPADM

## 2025-01-07 RX ORDER — SCOPOLAMINE 1 MG/3D
1 PATCH, EXTENDED RELEASE TRANSDERMAL
Status: CANCELLED | OUTPATIENT
Start: 2025-01-07

## 2025-01-07 RX ORDER — MIDAZOLAM HYDROCHLORIDE 1 MG/ML
INJECTION INTRAMUSCULAR; INTRAVENOUS PRN
Status: DISCONTINUED | OUTPATIENT
Start: 2025-01-07 | End: 2025-01-07 | Stop reason: SURG

## 2025-01-07 RX ORDER — OXYCODONE HCL 5 MG/5 ML
5 SOLUTION, ORAL ORAL
Status: DISCONTINUED | OUTPATIENT
Start: 2025-01-07 | End: 2025-01-07 | Stop reason: HOSPADM

## 2025-01-07 RX ORDER — CEFAZOLIN SODIUM 1 G/3ML
2 INJECTION, POWDER, FOR SOLUTION INTRAMUSCULAR; INTRAVENOUS ONCE
Status: COMPLETED | OUTPATIENT
Start: 2025-01-07 | End: 2025-01-07

## 2025-01-07 RX ORDER — OXYCODONE HCL 5 MG/5 ML
10 SOLUTION, ORAL ORAL
Status: DISCONTINUED | OUTPATIENT
Start: 2025-01-07 | End: 2025-01-07 | Stop reason: HOSPADM

## 2025-01-07 RX ORDER — KETOROLAC TROMETHAMINE 15 MG/ML
15 INJECTION, SOLUTION INTRAMUSCULAR; INTRAVENOUS EVERY 6 HOURS
Status: CANCELLED | OUTPATIENT
Start: 2025-01-07 | End: 2025-01-10

## 2025-01-07 RX ORDER — DEXAMETHASONE SODIUM PHOSPHATE 4 MG/ML
INJECTION, SOLUTION INTRA-ARTICULAR; INTRALESIONAL; INTRAMUSCULAR; INTRAVENOUS; SOFT TISSUE PRN
Status: DISCONTINUED | OUTPATIENT
Start: 2025-01-07 | End: 2025-01-07 | Stop reason: SURG

## 2025-01-07 RX ORDER — SUCCINYLCHOLINE CHLORIDE 20 MG/ML
INJECTION INTRAMUSCULAR; INTRAVENOUS PRN
Status: DISCONTINUED | OUTPATIENT
Start: 2025-01-07 | End: 2025-01-07 | Stop reason: SURG

## 2025-01-07 RX ORDER — EPINEPHRINE 1 MG/ML(1)
AMPUL (ML) INJECTION
Status: DISCONTINUED | OUTPATIENT
Start: 2025-01-07 | End: 2025-01-07 | Stop reason: HOSPADM

## 2025-01-07 RX ORDER — OXYCODONE HYDROCHLORIDE 5 MG/1
5 TABLET ORAL
Status: CANCELLED | OUTPATIENT
Start: 2025-01-07

## 2025-01-07 RX ORDER — HYDROMORPHONE HYDROCHLORIDE 2 MG/ML
INJECTION, SOLUTION INTRAMUSCULAR; INTRAVENOUS; SUBCUTANEOUS PRN
Status: DISCONTINUED | OUTPATIENT
Start: 2025-01-07 | End: 2025-01-07 | Stop reason: SURG

## 2025-01-07 RX ORDER — DIPHENHYDRAMINE HCL 25 MG
25 TABLET ORAL NIGHTLY PRN
Status: CANCELLED | OUTPATIENT
Start: 2025-01-08

## 2025-01-07 RX ORDER — OXYCODONE HYDROCHLORIDE 10 MG/1
10 TABLET ORAL
Status: CANCELLED | OUTPATIENT
Start: 2025-01-07

## 2025-01-07 RX ORDER — CELECOXIB 200 MG/1
200 CAPSULE ORAL ONCE
Status: COMPLETED | OUTPATIENT
Start: 2025-01-07 | End: 2025-01-07

## 2025-01-07 RX ORDER — SODIUM CHLORIDE, SODIUM LACTATE, POTASSIUM CHLORIDE, CALCIUM CHLORIDE 600; 310; 30; 20 MG/100ML; MG/100ML; MG/100ML; MG/100ML
INJECTION, SOLUTION INTRAVENOUS CONTINUOUS
Status: ACTIVE | OUTPATIENT
Start: 2025-01-07 | End: 2025-01-07

## 2025-01-07 RX ORDER — LOSARTAN POTASSIUM 100 MG/1
100 TABLET ORAL
Status: CANCELLED | OUTPATIENT
Start: 2025-01-07

## 2025-01-07 RX ORDER — POLYETHYLENE GLYCOL 3350 17 G/17G
1 POWDER, FOR SOLUTION ORAL 2 TIMES DAILY PRN
Status: CANCELLED | OUTPATIENT
Start: 2025-01-07

## 2025-01-07 RX ORDER — HYDROMORPHONE HYDROCHLORIDE 1 MG/ML
0.2 INJECTION, SOLUTION INTRAMUSCULAR; INTRAVENOUS; SUBCUTANEOUS
Status: DISCONTINUED | OUTPATIENT
Start: 2025-01-07 | End: 2025-01-07 | Stop reason: HOSPADM

## 2025-01-07 RX ORDER — AMOXICILLIN 250 MG
1 CAPSULE ORAL NIGHTLY
Status: CANCELLED | OUTPATIENT
Start: 2025-01-07

## 2025-01-07 RX ORDER — TRANEXAMIC ACID 100 MG/ML
INJECTION, SOLUTION INTRAVENOUS PRN
Status: DISCONTINUED | OUTPATIENT
Start: 2025-01-07 | End: 2025-01-07 | Stop reason: SURG

## 2025-01-07 RX ORDER — DOCUSATE SODIUM 100 MG/1
100 CAPSULE, LIQUID FILLED ORAL 2 TIMES DAILY
Status: CANCELLED | OUTPATIENT
Start: 2025-01-07

## 2025-01-07 RX ORDER — ACETAMINOPHEN 500 MG
1000 TABLET ORAL EVERY 6 HOURS
Status: CANCELLED | OUTPATIENT
Start: 2025-01-07 | End: 2025-01-12

## 2025-01-07 RX ORDER — HYDROMORPHONE HYDROCHLORIDE 1 MG/ML
0.1 INJECTION, SOLUTION INTRAMUSCULAR; INTRAVENOUS; SUBCUTANEOUS
Status: DISCONTINUED | OUTPATIENT
Start: 2025-01-07 | End: 2025-01-07 | Stop reason: HOSPADM

## 2025-01-07 RX ORDER — METOCLOPRAMIDE HYDROCHLORIDE 5 MG/ML
INJECTION INTRAMUSCULAR; INTRAVENOUS PRN
Status: DISCONTINUED | OUTPATIENT
Start: 2025-01-07 | End: 2025-01-07 | Stop reason: SURG

## 2025-01-07 RX ADMIN — CELECOXIB 200 MG: 200 CAPSULE ORAL at 06:52

## 2025-01-07 RX ADMIN — CEFAZOLIN 2 G: 1 INJECTION, POWDER, FOR SOLUTION INTRAMUSCULAR; INTRAVENOUS at 07:51

## 2025-01-07 RX ADMIN — HYDROMORPHONE HYDROCHLORIDE 0.4 MG: 2 INJECTION INTRAMUSCULAR; INTRAVENOUS; SUBCUTANEOUS at 08:40

## 2025-01-07 RX ADMIN — LABETALOL HYDROCHLORIDE 5 MG: 5 INJECTION, SOLUTION INTRAVENOUS at 09:35

## 2025-01-07 RX ADMIN — FENTANYL CITRATE 50 MCG: 50 INJECTION, SOLUTION INTRAMUSCULAR; INTRAVENOUS at 08:32

## 2025-01-07 RX ADMIN — DEXAMETHASONE SODIUM PHOSPHATE 8 MG: 4 INJECTION INTRA-ARTICULAR; INTRALESIONAL; INTRAMUSCULAR; INTRAVENOUS; SOFT TISSUE at 07:49

## 2025-01-07 RX ADMIN — HYDROMORPHONE HYDROCHLORIDE 0.6 MG: 2 INJECTION INTRAMUSCULAR; INTRAVENOUS; SUBCUTANEOUS at 07:59

## 2025-01-07 RX ADMIN — LIDOCAINE HYDROCHLORIDE 50 MG: 20 INJECTION, SOLUTION EPIDURAL; INFILTRATION; INTRACAUDAL; PERINEURAL at 07:49

## 2025-01-07 RX ADMIN — ROPIVACAINE HYDROCHLORIDE 20 ML: 5 INJECTION EPIDURAL; INFILTRATION; PERINEURAL at 07:41

## 2025-01-07 RX ADMIN — SUGAMMADEX 200 MG: 100 INJECTION, SOLUTION INTRAVENOUS at 08:37

## 2025-01-07 RX ADMIN — FENTANYL CITRATE 50 MCG: 50 INJECTION, SOLUTION INTRAMUSCULAR; INTRAVENOUS at 07:48

## 2025-01-07 RX ADMIN — HYDROMORPHONE HYDROCHLORIDE 0.6 MG: 2 INJECTION INTRAMUSCULAR; INTRAVENOUS; SUBCUTANEOUS at 08:35

## 2025-01-07 RX ADMIN — MAGNESIUM SULFATE HEPTAHYDRATE 4 G: 2 INJECTION, SOLUTION INTRAVENOUS at 08:00

## 2025-01-07 RX ADMIN — TRANEXAMIC ACID 1000 MG: 100 INJECTION, SOLUTION INTRAVENOUS at 08:24

## 2025-01-07 RX ADMIN — PROPOFOL 170 MG: 10 INJECTION, EMULSION INTRAVENOUS at 07:49

## 2025-01-07 RX ADMIN — SODIUM CHLORIDE, POTASSIUM CHLORIDE, SODIUM LACTATE AND CALCIUM CHLORIDE: 600; 310; 30; 20 INJECTION, SOLUTION INTRAVENOUS at 07:36

## 2025-01-07 RX ADMIN — SUCCINYLCHOLINE CHLORIDE 100 MG: 20 INJECTION, SOLUTION INTRAMUSCULAR; INTRAVENOUS at 07:49

## 2025-01-07 RX ADMIN — ROCURONIUM BROMIDE 40 MG: 50 INJECTION, SOLUTION INTRAVENOUS at 07:52

## 2025-01-07 RX ADMIN — ESMOLOL HYDROCHLORIDE 30 MG: 100 INJECTION, SOLUTION INTRAVENOUS at 08:42

## 2025-01-07 RX ADMIN — ROCURONIUM BROMIDE 10 MG: 50 INJECTION, SOLUTION INTRAVENOUS at 07:49

## 2025-01-07 RX ADMIN — TRANEXAMIC ACID 1000 MG: 100 INJECTION, SOLUTION INTRAVENOUS at 07:52

## 2025-01-07 RX ADMIN — LIDOCAINE HYDROCHLORIDE 0.5 ML: 10 SOLUTION INTRAVENOUS at 07:36

## 2025-01-07 RX ADMIN — ACETAMINOPHEN 1000 MG: 500 TABLET ORAL at 06:52

## 2025-01-07 RX ADMIN — METOCLOPRAMIDE HYDROCHLORIDE 10 MG: 5 INJECTION INTRAMUSCULAR; INTRAVENOUS at 07:49

## 2025-01-07 RX ADMIN — MIDAZOLAM HYDROCHLORIDE 1 MG: 1 INJECTION, SOLUTION INTRAMUSCULAR; INTRAVENOUS at 07:36

## 2025-01-07 RX ADMIN — ONDANSETRON 4 MG: 2 INJECTION INTRAMUSCULAR; INTRAVENOUS at 08:21

## 2025-01-07 RX ADMIN — HYDROMORPHONE HYDROCHLORIDE 0.4 MG: 2 INJECTION INTRAMUSCULAR; INTRAVENOUS; SUBCUTANEOUS at 08:30

## 2025-01-07 ASSESSMENT — GAIT ASSESSMENTS
DEVIATION: ANTALGIC;DECREASED HEEL STRIKE
ASSISTIVE DEVICE: FRONT WHEEL WALKER
GAIT LEVEL OF ASSIST: STANDBY ASSIST
DISTANCE (FEET): 150

## 2025-01-07 ASSESSMENT — COGNITIVE AND FUNCTIONAL STATUS - GENERAL
CLIMB 3 TO 5 STEPS WITH RAILING: A LITTLE
SUGGESTED CMS G CODE MODIFIER MOBILITY: CJ
MOBILITY SCORE: 21
WALKING IN HOSPITAL ROOM: A LITTLE
STANDING UP FROM CHAIR USING ARMS: A LITTLE

## 2025-01-07 ASSESSMENT — PAIN DESCRIPTION - PAIN TYPE
TYPE: SURGICAL PAIN

## 2025-01-07 ASSESSMENT — FIBROSIS 4 INDEX: FIB4 SCORE: 1.33

## 2025-01-07 NOTE — OR NURSING
0620 PT TO PRE OP TO ASSUME CARE.    0715 Patient allergies and NPO status verified, home medication reconciliation completed and belongings secured. Patient verbalizes understanding of pain scale, expected course of stay and plan of care. Surgical site verified with patient. IV access established. Sequentials placed RLE

## 2025-01-07 NOTE — OR NURSING
0904: Care assumed of awake pt who denies pain/nausea and requests d/c home today. Icepack in place over c/d/I LLE surgical dressings.   0915: tolerated po water, now sipping soda.Plan to keep pt in PACU for full hour per [facility] protocol.  0930: plan Labetalol for HTN, pt states she did not take usual antihypertensive at home today  0945: BP decreased  1000: BP stable, ready for mobilization. No change in surgical site assessment.    1024: Patient stood at side of bed with CNA assist and walker for safety. Patient marched in place. Patient has adequate lower extremity function to participate in PT in Stage 2. Now ambulating to Stage 2

## 2025-01-07 NOTE — THERAPY
Physical Therapy   Initial Evaluation     Patient Name: Madiha Garrett  Age:  74 y.o., Sex:  female  Medical Record #: [id number]  Today's Date: 1/7/2025     Precautions  Precautions: (P) Weight Bearing As Tolerated Left Lower Extremity    Assessment  Patient is 74 y.o. female who was seen s/p left TKA with WBAT orders for left lower extremity. Pt was agreeable to therapy evaluation and presented with safe upright functional mobility with AD use after therapy session. Pt was provided with education on elevation, icing, positioning, and supine/seated therapeutic exercises. Pt was able to return demo safe gait mechanics with use of AD on flat level surfaces with appropriate heel to toe gait mechanics. Pt was able to return demo safe mechanics in order to navigate stairs with use of FWW. Pt was able to demonstrate safe use of AD during transfers/transitions and general locomotion with no malissa LOB. Pt was primarily limited by nausea and dec in SpO2 down to 85% on RA. Pt was able to recover with deep breathing and rest break back to 95%. RN made aware of concerns with nausea and O2. Pt has no acute skilled PT needs at this time, anticipate pt to d/c home once medically clear with recs for FWW use and OP therapy services.     The pt was provided with the following skilled therapy txt: Pt was provided with VC, demo, and facilitation during gait training in order to return demo terminal knee extension and appropriate heel strike/flexion of knee in order to return demo appropriate heel to toe gait mechanics. Pt was provided with demo and VC to go up/down steps with safe mechanics with use of AD. Pt was provided with VC and TC for appropriate quad contraction and mechanics during supine/seated therapeutic exercises. Pt was provided with education on intensity, frequency, and duration of exercises.    Plan    Physical Therapy Initial Treatment Plan   Duration: (P) Evaluation only    DC Equipment Recommendations: (P)  Front-Wheel Walker  Discharge Recommendations: (P) Recommend outpatient physical therapy services to address higher level deficits     Objective       01/07/25 1050   Initial Contact Note    Initial Contact Note Order Received and Verified, Evaluation Only - Patient Does Not Require Further Acute Physical Therapy at this Time.  However, May Benefit from Post Acute Therapy for Higher Level Functional Deficits.   Precautions   Precautions Weight Bearing As Tolerated Left Lower Extremity   Vitals   Pulse Oximetry (!) 85 %   Vitals Comments after ambulation   Pain 0 - 10 Group   Location Knee   Location Orientation Left   Description Aching   Therapist Pain Assessment During Activity;Prior to Activity;Post Activity;Nurse Notified;3   Prior Living Situation   Prior Services None   Housing / Facility 1 Story House   Steps Into Home 2   Steps In Home 0   Equipment Owned 4-Wheel Walker;Single Point Cane   Lives with - Patient's Self Care Capacity Spouse   Comments spouse is supportive and can assist upon d/c to home   Prior Level of Functional Mobility   Bed Mobility Independent   Transfer Status Independent   Ambulation Independent   Ambulation Distance   (community)   Assistive Devices Used None   Stairs Independent   History of Falls   History of Falls No   Cognition    Cognition / Consciousness WDL   Passive ROM Upper Body   Passive ROM Upper Body WDL   Active ROM Upper Body   Active ROM Upper Body  WDL   Strength Upper Body   Upper Body Strength  WDL   Sensation Upper Body   Upper Extremity Sensation  WDL   Upper Body Muscle Tone   Upper Body Muscle Tone  WDL   Passive ROM Lower Body   Passive ROM Lower Body X   Comments limited due to pain, able to demo 0-90 deg of L knee ROM   Active ROM Lower Body    Active ROM Lower Body  X   Comments same as above   Strength Lower Body   Lower Body Strength  X   Comments limited due to pain, able to demo functional strength for B LE   Sensation Lower Body   Lower Extremity  Sensation   WDL   Lower Body Muscle Tone   Lower Body Muscle Tone  WDL   Neurological Concerns   Neurological Concerns No   Coordination Upper Body   Coordination WDL   Coordination Lower Body    Coordination Lower Body  WDL   Balance Assessment   Sitting Balance (Static) Good   Sitting Balance (Dynamic) Fair +   Standing Balance (Static) Fair +   Standing Balance (Dynamic) Fair +   Weight Shift Sitting Good   Weight Shift Standing Fair   Comments w/fww use, no malissa LOB noted   Bed Mobility    Comments found up in chair   Gait Analysis   Gait Level Of Assist Standby Assist   Assistive Device Front Wheel Walker   Distance (Feet) 150   # of Times Distance was Traveled 1   Deviation Antalgic;Decreased Heel Strike   # of Stairs Climbed 2   Level of Assist with Stairs Standby Assist   Weight Bearing Status WBAT L LE   Functional Mobility   Sit to Stand Standby Assist   Bed, Chair, Wheelchair Transfer Standby Assist   Transfer Method Stand Step   Mobility sit<>stand, ambulation, stairs, transfer back to chair   6 Clicks Assessment - How much HELP from from another person do you currently need... (If the patient hasn't done an activity recently, how much help from another person do you think he/she would need if he/she tried?)   Turning from your back to your side while in a flat bed without using bedrails? 4   Moving from lying on your back to sitting on the side of a flat bed without using bedrails? 4   Moving to and from a bed to a chair (including a wheelchair)? 4   Standing up from a chair using your arms (e.g., wheelchair, or bedside chair)? 3   Walking in hospital room? 3   Climbing 3-5 steps with a railing? 3   6 clicks Mobility Score 21   Activity Tolerance   Sitting in Chair functional   Sitting Edge of Bed NT   Standing 10 mins   Comments limited by intermittent nausea and dec in Spo2, requires seated and standing rest breaks   Edema / Skin Assessment   Edema / Skin  Not Assessed   Education Group   Education  Provided Role of Physical Therapist;Exercises - Supine;Exercises - Seated;Gait Training;Stair Training;Use of Assistive Device   Role of Physical Therapist Patient Response Patient;Acceptance;Explanation;Demonstration;Verbal Demonstration;Action Demonstration   Gait Training Patient Response Patient;Acceptance;Explanation;Demonstration;Verbal Demonstration;Action Demonstration   Stair Training Patient Response Patient;Acceptance;Explanation;Demonstration;Verbal Demonstration;Action Demonstration   Use of Assistive Device Patient Response Patient;Acceptance;Explanation;Demonstration;Verbal Demonstration;Action Demonstration   Exercises - Supine Patient Response Patient;Acceptance;Explanation;Demonstration;Handout;Action Demonstration;Verbal Demonstration   Exercises - Seated Patient Response Patient;Acceptance;Explanation;Demonstration;Handout;Verbal Demonstration;Action Demonstration   Physical Therapy Initial Treatment Plan    Duration Evaluation only   Anticipated Discharge Equipment and Recommendations   DC Equipment Recommendations Front-Wheel Walker   Discharge Recommendations Recommend outpatient physical therapy services to address higher level deficits   Interdisciplinary Plan of Care Collaboration   IDT Collaboration with  Nursing   Patient Position at End of Therapy Seated;Call Light within Reach;Phone within Reach;Tray Table within Reach;Family / Friend in Room   Collaboration Comments aware of visit and recs   Session Information   Date / Session Number  1/7 eval only   Priority 0

## 2025-01-07 NOTE — OR NURSING
0845: Patient arrived to PACU from OR via gurney. Report received from anesthesia and RN. Respirations are spontaneous and unlabored. VSS on 6L. Dressing is clean, dry and intact. Cold pack applied. LLE elevated. LLE; pedal pulse is 2+, cap refill less than 3 seconds, warm pink.     0900: Patient denies pain or nausea.    0904: Report to Anahi AGUILA.

## 2025-01-07 NOTE — ANESTHESIA PREPROCEDURE EVALUATION
Case: [id number] Date/Time: 01/07/25 0745    Procedure: ROBOTIC LEFT TOTAL KNEE ARTHROPLASTY (Left)    Diagnosis: Primary osteoarthritis of left knee [M17.12]    Pre-op diagnosis: Primary osteoarthritis of left knee [M17.12]    Location: [place] [address] / SURGERY [facility]    Surgeons: David Santa M.D.            Relevant Problems   ANESTHESIA   (positive) PONV (postoperative nausea and vomiting)      PULMONARY (within normal limits)      NEURO (within normal limits)      CARDIAC   (positive) Essential hypertension      ENDO   (positive) Hyperthyroidism      Other   (positive) Prediabetes   (positive) Primary osteoarthritis of left knee       Physical Exam    Airway   Mallampati: III  TM distance: >3 FB  Neck ROM: full       Cardiovascular - normal exam  Rhythm: regular  Rate: normal  (-) murmur     Dental - normal exam           Pulmonary - normal exam  Breath sounds clear to auscultation     Abdominal    Neurological - normal exam                   Anesthesia Plan    ASA 2       Plan - general and peripheral nerve block     Peripheral nerve block will be post-op pain control  Airway plan will be ETT          Induction: intravenous    Postoperative Plan: Postoperative administration of opioids is intended.    Pertinent diagnostic labs and testing reviewed    Informed Consent:    Anesthetic plan and risks discussed with patient.    Use of blood products discussed with: patient whom consented to blood products.

## 2025-01-07 NOTE — LETTER
November 21, 2024    Patient Name: Jasiel Garrett  Surgeon Name: David Santa M.D.  Surgery Facility: [facility] ([address] [place])  Surgery Date: 1/7/2025    The time of your surgery is not final and may change up to and until the day of your surgery. You will be contacted 24-48 hours prior to your surgery date with your check-in and surgery time.    If you will not be at one of the below numbers please call the surgery scheduler at [phone]  Preferred Phone: [phone]    BEFORE YOUR SURGERY   Pre Registration and/or Lab Work must be done within and no earlier than 28 days prior to your surgery date. Your scheduled facility will contact you regarding all required preregistration and/or lab work. If you have not been contacted within 7 days of your scheduled procedure please call [facility] at [phone] for an appointment as soon as possible.    DAY OF YOUR SURGERY  Nothing to eat or drink after midnight     Refrain from smoking any substance after midnight prior to surgery. Smoking may interfere with the anesthetic and frequently produces nausea during the recovery period.    Continue taking all lifesaving medications. Including the morning of your surgery with small sip of water.    Please do NOT take on the day of surgery:  Diuretics: examples- furosemide (Lasix), spironolactone, hydrochlorothiazide  ACE-inhibitors: examples- lisinopril, ramipril, enalapril  “ARBs”: examples- losartan, Olmesartan, valsartan    Please arrive at the hospital/surgery center at the check-in time provided.     An adult will need to bring you and take you home after your surgery.     AFTER YOUR SURGERY  Post op Appointment:   Date: 01/22/2025   Time: 01:15PM   With: Elver Bardales PA-C   Location: [address] [place] [address] [place] [address]    - Therapy- Your first appointment should be 4-5  day(s) after your surgery. For your convenience we have 4 Physical Therapy  locations: [facility], [place], and [facility]. Call our office ASAP to schedule an appointment at [phone] or take the enclosed Therapy Prescription to a facility of your choice.  - No dental work for 3-6 months after your surgery.  - Post Surgery - You will need someone to drive you home  - Post Surgery - You will need someone to stay with you for 24 hours  - You must have someone provide transportation post surgery and someone to monitor you for at least 24 hours post-surgery. If you don't have either of these your appointment will be canceled.     TIME OFF WORK  FMLA or Disability forms can be faxed directly to: [phone] or you may drop them off at [address] [place] [address]. Our office charges a $35.00 fee per form. Forms will be completed within 10 business days of drop off and payment received. For the status of your forms you may contact our disability office directly [phone].    MEDICATION INSTRUCTIONS **Please read section completely**  The following medications should be stopped a minimum of 10 days prior to surgery:  All over the counter, Supplements & Herbal medications    Anorectics: Phentermine (Adipex-P, Lomaira and Suprenza), Phentermine-topiramate (Qsymia), Bupropion-naltrexone (Contrave)    **If you are on Bupropion for anxiety/depression, please continue this medication up until the day of surgery.     Opiod Partial Agonists/Opioid Antagonists: Buprenorphine (Suboxone, Belbuca, Butrans, Probuphine Implant, Sublocade), Naltrexone (ReVia, Vivitrol), Naloxone    Amphetamines: Dextroamphetamine/Amphetamine (Adderall, Mydayis), Methylphenidate Hydrochloride (Concerta, Metadate, Methylin, Ritalin)    The following medications should be stopped 5 days prior to surgery:  Blood Thinners: Any Aspirin, Aspirin products, anti-inflammatories such as ibuprofen and any blood thinners such as Coumadin and Plavix. Please consult your prescribing physician if you are on life  saving blood thinners, in regards to when to stop medications prior to surgery.     The following medications should be stopped a minimum of 3 days prior to surgery:  PDE-5 inhibitors: Sildenafil (Viagra), Tadalafil (Cialis), Vardenafil (Levitra), Avanafil (Stendra)    MAO Inhibitors: Rasagiline (Azilect), Selegiline (Eldepryl, Emsam, Selapar), Isocarboxazid (Marplan), Phenelzine (Nardil)

## 2025-01-07 NOTE — ANESTHESIA PROCEDURE NOTES
Airway    Date/Time: 1/7/2025 7:50 AM    Performed by: Brooklyn Mcwilliams M.D.  Authorized by: Brooklyn Mcwilliams M.D.    Location:  OR  Urgency:  Elective  Indications for Airway Management:  Anesthesia      Spontaneous Ventilation: absent    Sedation Level:  Deep  Preoxygenated: Yes    Patient Position:  Sniffing  Final Airway Type:  Endotracheal airway  Final Endotracheal Airway:  ETT  Cuffed: Yes    Technique Used for Successful ETT Placement:  Direct laryngoscopy    Insertion Site:  Oral  Blade Type:  Glide  Laryngoscope Blade/Videolaryngoscope Blade Size:  3  ETT Size (mm):  7.0  Measured from:  Teeth  ETT to Teeth (cm):  22  Placement Verified by: auscultation and capnometry    Cormack-Lehane Classification:  Grade I - full view of glottis  Number of Attempts at Approach:  1

## 2025-01-07 NOTE — ANESTHESIA TIME REPORT
Anesthesia Start and Stop Event Times       Date Time Event    1/7/2025 0727 Ready for Procedure     0745 Anesthesia Start     0848 Anesthesia Stop          Responsible Staff  01/07/25      Name Role Begin End    Brooklyn Mcwilliams M.D. Anesth 0745 0848          Overtime Reason:  no overtime (within assigned shift)    Comments:

## 2025-01-07 NOTE — OP REPORT
Preop Diagnosis: Advanced left knee arthritis  Postop Diagnosis:  Same  Procedure: Left total knee arthroplasty, Use of robotic navigation for knee replacement  Surgeon: Dr. David Santa MD  Assistant: Elver Bardales PA-C  Anesthesia: Dr. Mcwilliams. General + Adductor canal block  Estimated Blood Loss: 50cc  Drains: None  Complications: None    Implants: Rosa Triathlon CR Cementless, size 3 femur, size 4 tibia, with an 11 mm CS insert and 35 oval patella.    Indications: Madiha Garrett is a 74 y.o. female who has had severe progressive knee pain that failed conservative management.  There were severe degenerative changes on radiographs.  We discussed the options and she was ultimately indicated for knee replacement.  We discussed the risk of bleeding, transfusion, pain, neurovascular injury, stiffness, fracture, infection, wound complication, loosening of the parts over time, blood clots, and medical complication.  No guarantees were made and she elected to proceed.    Pertinent Findings and Releases: There were severe degenerative changes with varus deformity.  At the end of the case, the knee easily came to full extension and flexed up to calf/thigh impingement with the arthrotomy closed.  The patella tracked centrally.    Informed consent and site marking were confirmed in preop.  An adductor canal block had been performed by the anesthesiologist, and then she was brought back to the OR where anesthesia was performed. Supine positioning was perfmored with all bony prominences well padded with a padded tourniquet on the thigh.  The extremity was prepped and draped in the usual sterile fashion. I performed a pre-procedure timeout to confirm we had the correct patient, side, site, procedure, and presence of necessary personal and equipment.  I confirmed that Ancef and tranexamic acid were given.  The tourniquet was then inflated.    A midline incision was made medial to the tibial tubercle centered over patella taken  down to the deep capsule of the knee. A short medial parapatellar arthrotomy was performed.  The fat pad was released. The patella was subluxated and the knee was flexed. The remnants of the anterior horn of the medial and lateral meniscus were removed.  Two pins were placed into the proximal tibia within the incision and two additional pins were placed into the femoral diaphysis through stab incisions proximal to the knee incision to dock the robotic sensors.  The knee was then registered and taken through a range of motion to gauge the ligament balance.  The surgical plan was then modified on the computer, and all of the bone cuts were made without any difficulties.  The knee was then tensed at 90 degrees and the meniscal remnants and posterior osteophytes were removed.  The posterior capsule was injected with a local anesthetic cocktail.  The tibia was then subluxed forward, sized, and punched in the appropriate amount of external rotation and mechanical alignment was verified using a drop fabian. Trials were placed, the knee was reduced with a trial insert, it was taken through a range of motion, and found to be stable in the varus/valgus plane 0-30 degrees of flexion and the AP plane at 90 degrees of flexion. Attention was then turned to the patella. A symmetric resection was performed to recreate native patella height and appropriately sized. All extraneous osteophyte and synovium were removed.  With a trial in place, the patella tracked centrally using the no thumbs technique. All trial components were removed. The real components were impacted and the trial liner was place and found to be appropriate.  The tourniquet was let down and hemostasis ensured. The real insert was placed. Stability and patellar tracking were excellent. The knee was then copiously irrigated. One gram of Vancomycin was left in the wound.  The arthrotomy was closed with number 2 running barbed suture, and the wound was closed in layers.   An occlusive silver dressing was applied and the patient was turned over to anesthesia in stable condition without any apparent intraoperative complications.     Plan:  WBAT with a walker (which will need to be provided if they do not already have one due to weakness, imbalance, and fall risk), Aspirin 81mg BID for 4 weeks for DVT prophylaxis, Leave dressing in place until followup.

## 2025-01-07 NOTE — OR NURSING
1024: Patient ambulates to phase II from PACU 1 using walker with stand by assist. Report received from RN. Respirations are spontaneous and unlabored. Dressing is CDI. VSS on RA. LLE; pedal pulse is 2+, cap refill less than 3 seconds, warm.    1028: Family at bedside.    1032: PT at bedside.     1050: Patient cleared by PT.    1100: Patient education completed, patient and family deny further questions. Walker dispensed.     1105: Pt voiding with help of CNA.    1117: Patient wheeled to vehicle by CNA at this time. Dc'd to care of family post uneventful stay in PACU 2.

## 2025-01-07 NOTE — ANESTHESIA POSTPROCEDURE EVALUATION
Patient: Madiha Garrett    Procedure Summary       Date: 01/07/25 Room / Location: [place] [address] / SURGERY [facility]    Anesthesia Start: 0745 Anesthesia Stop: 0848    Procedure: ROBOTIC LEFT TOTAL KNEE ARTHROPLASTY (Left: Knee) Diagnosis:       Primary osteoarthritis of left knee      (Primary osteoarthritis of left knee)    Surgeons: David Santa M.D. Responsible Provider: Brooklyn Mcwilliams M.D.    Anesthesia Type: general, peripheral nerve block ASA Status: 2            Final Anesthesia Type: general, peripheral nerve block  Last vitals  BP   Blood Pressure : 137/87, NIBP: (!) 176/97    Temp   36 °C (96.8 °F)    Pulse   86   Resp   16    SpO2   97 %      Anesthesia Post Evaluation    Patient location during evaluation: PACU  Patient participation: complete - patient participated  Level of consciousness: awake and alert    Airway patency: patent  Anesthetic complications: no  Cardiovascular status: hemodynamically stable  Respiratory status: acceptable  Hydration status: euvolemic    PONV: none          No notable events documented.     Nurse Pain Score: 0 (NPRS)

## 2025-01-07 NOTE — H&P
Surgery Orthopedic History & Physical Note    Date  2025    Primary Care Physician  Samir Amos D.O.    CC  Pre-Op Diagnosis Codes:      * Primary osteoarthritis of left knee [M17.12]    HPI  This is a 74 y.o. female who presents for a TKA.    Past Medical History:   Diagnosis Date    Arthritis     Bowel habit changes     24 constipation    Hyperlipidemia     Hypertension     Hyperthyroidism     Graves' disease / thyrotropin receptor antibody = 6.2, hyperthyroid    Urinary incontinence        Past Surgical History:   Procedure Laterality Date    KNEE REPLACEMENT, TOTAL Right 2018    Approximate date in 2018 [facility]    HYSTEROSCOPY WITH VIDEO DIAGNOSTIC      ectopic pregnancy, and appendix removal       No current facility-administered medications for this encounter.       Social History     Socioeconomic History    Marital status:      Spouse name: Not on file    Number of children: Not on file    Years of education: Not on file    Highest education level: Bachelor's degree (e.g., BA, AB, BS)   Occupational History    Not on file   Tobacco Use    Smoking status: Former     Current packs/day: 0.00     Average packs/day: 0.5 packs/day for 27.2 years (13.6 ttl pk-yrs)     Types: Cigarettes     Start date: 1970     Quit date: 3/1/1997     Years since quittin.8     Passive exposure: Never    Smokeless tobacco: Never   Vaping Use    Vaping status: Never Used   Substance and Sexual Activity    Alcohol use: Yes     Alcohol/week: 1.2 oz     Types: 2 Shots of liquor per week     Comment: 2 per week    Drug use: No    Sexual activity: Yes     Partners: Male   Other Topics Concern    Not on file   Social History Narrative    Not on file     Social Drivers of Health     Financial Resource Strain: Low Risk  (2023)    Overall Financial Resource Strain (CARDIA)     Difficulty of Paying Living Expenses: Not hard at all   Food Insecurity: No Food Insecurity  (2/10/2024)    Hunger Vital Sign     Worried About Running Out of Food in the Last Year: Never true     Ran Out of Food in the Last Year: Never true   Transportation Needs: No Transportation Needs (2/10/2024)    PRAPARE - Transportation     Lack of Transportation (Medical): No     Lack of Transportation (Non-Medical): No   Physical Activity: Insufficiently Active (2/10/2024)    Exercise Vital Sign     Days of Exercise per Week: 3 days     Minutes of Exercise per Session: 40 min   Stress: Stress Concern Present (2/10/2024)    [employer] - Occupational Stress Questionnaire     Feeling of Stress : To some extent   Social Connections: Unknown (2/10/2024)    Social Connection and Isolation Panel [NHANES]     Frequency of Communication with Friends and Family: Three times a week     Frequency of Social Gatherings with Friends and Family: Once a week     Attends Sikh Services: Patient declined     Active Member of Clubs or Organizations: Patient declined     Attends Club or Organization Meetings: Not on file     Marital Status:    Intimate Partner Violence: Not on file   Housing Stability: Unknown (2/10/2024)    Housing Stability Vital Sign     Unable to Pay for Housing in the Last Year: No     Number of Places Lived in the Last Year: Not on file     Unstable Housing in the Last Year: No       Family History   Problem Relation Age of Onset    Arthritis Mother     Heart Disease Father         arteriosclerosis    Hypertension Father     Alcohol/Drug Sister     Alcohol/Drug Brother        Allergies  Oxycodone    Review of Systems  Negative    Physical Exam  Regular rate and rhythm  Nonlabored breathing  Abdomen soft and nontender   Neurovascular intact distally  Skin is in good condition    Vital Signs                          Labs:                    Radiology:  No orders to display         Assessment/Plan:  Pre-Op Diagnosis Codes:      * Primary osteoarthritis of left knee  [M17.12]  Procedure(s):  ROBOTIC LEFT TOTAL KNEE ARTHROPLASTY

## 2025-01-16 ENCOUNTER — HOSPITAL ENCOUNTER (INPATIENT)
Facility: MEDICAL CENTER | Age: 75
LOS: 4 days | DRG: 378 | End: 2025-01-20
Attending: INTERNAL MEDICINE | Admitting: HOSPITALIST
Payer: MEDICARE

## 2025-01-16 ENCOUNTER — ANESTHESIA (OUTPATIENT)
Dept: SURGERY | Facility: MEDICAL CENTER | Age: 75
DRG: 378 | End: 2025-01-16
Payer: MEDICARE

## 2025-01-16 ENCOUNTER — ANESTHESIA EVENT (OUTPATIENT)
Dept: SURGERY | Facility: MEDICAL CENTER | Age: 75
DRG: 378 | End: 2025-01-16
Payer: MEDICARE

## 2025-01-16 DIAGNOSIS — K26.9 DUODENAL ULCER: ICD-10-CM

## 2025-01-16 DIAGNOSIS — K92.2 ACUTE GI BLEEDING: ICD-10-CM

## 2025-01-16 PROBLEM — R00.0 TACHYCARDIA: Status: ACTIVE | Noted: 2025-01-16

## 2025-01-16 PROBLEM — D62 ACUTE BLOOD LOSS ANEMIA: Status: ACTIVE | Noted: 2025-01-16

## 2025-01-16 LAB
ABO + RH BLD: NORMAL
ABO GROUP BLD: NORMAL
ALBUMIN SERPL BCP-MCNC: 3.3 G/DL (ref 3.2–4.9)
ALBUMIN/GLOB SERPL: 1.2 G/DL
ALP SERPL-CCNC: 52 U/L (ref 30–99)
ALT SERPL-CCNC: 15 U/L (ref 2–50)
ANION GAP SERPL CALC-SCNC: 12 MMOL/L (ref 7–16)
AST SERPL-CCNC: 12 U/L (ref 12–45)
BARCODED ABORH UBTYP: 6200
BARCODED ABORH UBTYP: 6200
BARCODED PRD CODE UBPRD: NORMAL
BARCODED PRD CODE UBPRD: NORMAL
BARCODED UNIT NUM UBUNT: NORMAL
BARCODED UNIT NUM UBUNT: NORMAL
BASOPHILS # BLD AUTO: 0.3 % (ref 0–1.8)
BASOPHILS # BLD AUTO: 0.5 % (ref 0–1.8)
BASOPHILS # BLD: 0.05 K/UL (ref 0–0.12)
BASOPHILS # BLD: 0.05 K/UL (ref 0–0.12)
BILIRUB SERPL-MCNC: 0.5 MG/DL (ref 0.1–1.5)
BLD GP AB SCN SERPL QL: NORMAL
BUN SERPL-MCNC: 39 MG/DL (ref 8–22)
CALCIUM ALBUM COR SERPL-MCNC: 9.3 MG/DL (ref 8.5–10.5)
CALCIUM SERPL-MCNC: 8.7 MG/DL (ref 8.4–10.2)
CFT BLD TEG: 4.3 MIN (ref 4.6–9.1)
CFT P HPASE BLD TEG: 3.8 MIN (ref 4.3–8.3)
CHLORIDE SERPL-SCNC: 105 MMOL/L (ref 96–112)
CLOT ANGLE BLD TEG: 76.5 DEGREES (ref 63–78)
CO2 SERPL-SCNC: 21 MMOL/L (ref 20–33)
COMPONENT R 8504R: NORMAL
COMPONENT R 8504R: NORMAL
CREAT SERPL-MCNC: 0.82 MG/DL (ref 0.5–1.4)
CT.EXTRINSIC BLD ROTEM: 0.9 MIN (ref 0.8–2.1)
EOSINOPHIL # BLD AUTO: 0.08 K/UL (ref 0–0.51)
EOSINOPHIL # BLD AUTO: 0.14 K/UL (ref 0–0.51)
EOSINOPHIL NFR BLD: 0.5 % (ref 0–6.9)
EOSINOPHIL NFR BLD: 1.5 % (ref 0–6.9)
ERYTHROCYTE [DISTWIDTH] IN BLOOD BY AUTOMATED COUNT: 48.2 FL (ref 35.9–50)
ERYTHROCYTE [DISTWIDTH] IN BLOOD BY AUTOMATED COUNT: 50.9 FL (ref 35.9–50)
GFR SERPLBLD CREATININE-BSD FMLA CKD-EPI: 75 ML/MIN/1.73 M 2
GLOBULIN SER CALC-MCNC: 2.8 G/DL (ref 1.9–3.5)
GLUCOSE SERPL-MCNC: 148 MG/DL (ref 65–99)
HCT VFR BLD AUTO: 27.7 % (ref 37–47)
HCT VFR BLD AUTO: 32.8 % (ref 37–47)
HGB BLD-MCNC: 10.9 G/DL (ref 12–16)
HGB BLD-MCNC: 8.8 G/DL (ref 12–16)
IMM GRANULOCYTES # BLD AUTO: 0.06 K/UL (ref 0–0.11)
IMM GRANULOCYTES # BLD AUTO: 0.12 K/UL (ref 0–0.11)
IMM GRANULOCYTES NFR BLD AUTO: 0.7 % (ref 0–0.9)
IMM GRANULOCYTES NFR BLD AUTO: 0.8 % (ref 0–0.9)
INR PPP: 0.98 (ref 0.87–1.13)
LYMPHOCYTES # BLD AUTO: 1.1 K/UL (ref 1–4.8)
LYMPHOCYTES # BLD AUTO: 1.59 K/UL (ref 1–4.8)
LYMPHOCYTES NFR BLD: 17.2 % (ref 22–41)
LYMPHOCYTES NFR BLD: 7.2 % (ref 22–41)
MCF BLD TEG: 65.5 MM (ref 52–69)
MCF.PLATELET INHIB BLD ROTEM: 26.3 MM (ref 15–32)
MCH RBC QN AUTO: 32.7 PG (ref 27–33)
MCH RBC QN AUTO: 32.7 PG (ref 27–33)
MCHC RBC AUTO-ENTMCNC: 31.8 G/DL (ref 32.2–35.5)
MCHC RBC AUTO-ENTMCNC: 33.2 G/DL (ref 32.2–35.5)
MCV RBC AUTO: 103 FL (ref 81.4–97.8)
MCV RBC AUTO: 98.5 FL (ref 81.4–97.8)
MONOCYTES # BLD AUTO: 0.64 K/UL (ref 0–0.85)
MONOCYTES # BLD AUTO: 0.75 K/UL (ref 0–0.85)
MONOCYTES NFR BLD AUTO: 4.9 % (ref 0–13.4)
MONOCYTES NFR BLD AUTO: 6.9 % (ref 0–13.4)
NEUTROPHILS # BLD AUTO: 13.24 K/UL (ref 1.82–7.42)
NEUTROPHILS # BLD AUTO: 6.75 K/UL (ref 1.82–7.42)
NEUTROPHILS NFR BLD: 73.2 % (ref 44–72)
NEUTROPHILS NFR BLD: 86.3 % (ref 44–72)
NRBC # BLD AUTO: 0 K/UL
NRBC # BLD AUTO: 0 K/UL
NRBC BLD-RTO: 0 /100 WBC (ref 0–0.2)
NRBC BLD-RTO: 0 /100 WBC (ref 0–0.2)
PA AA BLD-ACNC: 50.3 % (ref 0–11)
PA ADP BLD-ACNC: 60 % (ref 0–17)
PLATELET # BLD AUTO: 289 K/UL (ref 164–446)
PLATELET # BLD AUTO: 340 K/UL (ref 164–446)
PMV BLD AUTO: 9.7 FL (ref 9–12.9)
PMV BLD AUTO: 9.8 FL (ref 9–12.9)
POTASSIUM SERPL-SCNC: 4.1 MMOL/L (ref 3.6–5.5)
PRODUCT TYPE UPROD: NORMAL
PRODUCT TYPE UPROD: NORMAL
PROT SERPL-MCNC: 6.1 G/DL (ref 6–8.2)
PROTHROMBIN TIME: 13.3 SEC (ref 12–14.6)
RBC # BLD AUTO: 2.69 M/UL (ref 4.2–5.4)
RBC # BLD AUTO: 3.33 M/UL (ref 4.2–5.4)
RH BLD: NORMAL
SODIUM SERPL-SCNC: 138 MMOL/L (ref 135–145)
TEG ALGORITHM TGALG: ABNORMAL
UNIT STATUS USTAT: NORMAL
UNIT STATUS USTAT: NORMAL
WBC # BLD AUTO: 15.3 K/UL (ref 4.8–10.8)
WBC # BLD AUTO: 9.2 K/UL (ref 4.8–10.8)

## 2025-01-16 PROCEDURE — 700105 HCHG RX REV CODE 258: Performed by: HOSPITALIST

## 2025-01-16 PROCEDURE — 0W3P8ZZ CONTROL BLEEDING IN GASTROINTESTINAL TRACT, VIA NATURAL OR ARTIFICIAL OPENING ENDOSCOPIC: ICD-10-PCS | Performed by: INTERNAL MEDICINE

## 2025-01-16 PROCEDURE — 80053 COMPREHEN METABOLIC PANEL: CPT

## 2025-01-16 PROCEDURE — 85610 PROTHROMBIN TIME: CPT

## 2025-01-16 PROCEDURE — 770020 HCHG ROOM/CARE - TELE (206)

## 2025-01-16 PROCEDURE — A9270 NON-COVERED ITEM OR SERVICE: HCPCS | Performed by: HOSPITALIST

## 2025-01-16 PROCEDURE — 85347 COAGULATION TIME ACTIVATED: CPT

## 2025-01-16 PROCEDURE — 160203 HCHG ENDO MINUTES - 1ST 30 MINS LEVEL 4: Performed by: INTERNAL MEDICINE

## 2025-01-16 PROCEDURE — 160002 HCHG RECOVERY MINUTES (STAT): Performed by: INTERNAL MEDICINE

## 2025-01-16 PROCEDURE — 85025 COMPLETE CBC W/AUTO DIFF WBC: CPT

## 2025-01-16 PROCEDURE — 86900 BLOOD TYPING SEROLOGIC ABO: CPT

## 2025-01-16 PROCEDURE — 96375 TX/PRO/DX INJ NEW DRUG ADDON: CPT

## 2025-01-16 PROCEDURE — 700102 HCHG RX REV CODE 250 W/ 637 OVERRIDE(OP): Performed by: HOSPITALIST

## 2025-01-16 PROCEDURE — 99291 CRITICAL CARE FIRST HOUR: CPT

## 2025-01-16 PROCEDURE — 86901 BLOOD TYPING SEROLOGIC RH(D): CPT

## 2025-01-16 PROCEDURE — 700101 HCHG RX REV CODE 250: Performed by: ANESTHESIOLOGY

## 2025-01-16 PROCEDURE — 700105 HCHG RX REV CODE 258: Performed by: EMERGENCY MEDICINE

## 2025-01-16 PROCEDURE — 96365 THER/PROPH/DIAG IV INF INIT: CPT

## 2025-01-16 PROCEDURE — 160208 HCHG ENDO MINUTES - EA ADDL 1 MIN LEVEL 4: Performed by: INTERNAL MEDICINE

## 2025-01-16 PROCEDURE — 36415 COLL VENOUS BLD VENIPUNCTURE: CPT

## 2025-01-16 PROCEDURE — 700111 HCHG RX REV CODE 636 W/ 250 OVERRIDE (IP): Mod: JZ | Performed by: INTERNAL MEDICINE

## 2025-01-16 PROCEDURE — 85576 BLOOD PLATELET AGGREGATION: CPT

## 2025-01-16 PROCEDURE — 96366 THER/PROPH/DIAG IV INF ADDON: CPT

## 2025-01-16 PROCEDURE — 700111 HCHG RX REV CODE 636 W/ 250 OVERRIDE (IP): Performed by: EMERGENCY MEDICINE

## 2025-01-16 PROCEDURE — 99223 1ST HOSP IP/OBS HIGH 75: CPT | Mod: AI | Performed by: HOSPITALIST

## 2025-01-16 PROCEDURE — 86850 RBC ANTIBODY SCREEN: CPT

## 2025-01-16 PROCEDURE — 160035 HCHG PACU - 1ST 60 MINS PHASE I: Performed by: INTERNAL MEDICINE

## 2025-01-16 PROCEDURE — 700105 HCHG RX REV CODE 258: Performed by: ANESTHESIOLOGY

## 2025-01-16 PROCEDURE — 700111 HCHG RX REV CODE 636 W/ 250 OVERRIDE (IP): Performed by: HOSPITALIST

## 2025-01-16 PROCEDURE — C1889 IMPLANT/INSERT DEVICE, NOC: HCPCS | Performed by: INTERNAL MEDICINE

## 2025-01-16 PROCEDURE — 160009 HCHG ANES TIME/MIN: Performed by: INTERNAL MEDICINE

## 2025-01-16 PROCEDURE — 700111 HCHG RX REV CODE 636 W/ 250 OVERRIDE (IP): Performed by: ANESTHESIOLOGY

## 2025-01-16 PROCEDURE — 160048 HCHG OR STATISTICAL LEVEL 1-5: Performed by: INTERNAL MEDICINE

## 2025-01-16 PROCEDURE — 85384 FIBRINOGEN ACTIVITY: CPT

## 2025-01-16 RX ORDER — PANTOPRAZOLE SODIUM 40 MG/10ML
40 INJECTION, POWDER, LYOPHILIZED, FOR SOLUTION INTRAVENOUS ONCE
Status: DISCONTINUED | OUTPATIENT
Start: 2025-01-16 | End: 2025-01-17

## 2025-01-16 RX ORDER — LIDOCAINE HYDROCHLORIDE 20 MG/ML
INJECTION, SOLUTION EPIDURAL; INFILTRATION; INTRACAUDAL; PERINEURAL PRN
Status: DISCONTINUED | OUTPATIENT
Start: 2025-01-16 | End: 2025-01-16 | Stop reason: SURG

## 2025-01-16 RX ORDER — ONDANSETRON 2 MG/ML
4 INJECTION INTRAMUSCULAR; INTRAVENOUS
Status: DISCONTINUED | OUTPATIENT
Start: 2025-01-16 | End: 2025-01-16 | Stop reason: HOSPADM

## 2025-01-16 RX ORDER — SODIUM CHLORIDE, SODIUM LACTATE, POTASSIUM CHLORIDE, CALCIUM CHLORIDE 600; 310; 30; 20 MG/100ML; MG/100ML; MG/100ML; MG/100ML
INJECTION, SOLUTION INTRAVENOUS CONTINUOUS
Status: DISCONTINUED | OUTPATIENT
Start: 2025-01-16 | End: 2025-01-16 | Stop reason: HOSPADM

## 2025-01-16 RX ORDER — SODIUM CHLORIDE, SODIUM LACTATE, POTASSIUM CHLORIDE, CALCIUM CHLORIDE 600; 310; 30; 20 MG/100ML; MG/100ML; MG/100ML; MG/100ML
INJECTION, SOLUTION INTRAVENOUS
Status: DISCONTINUED | OUTPATIENT
Start: 2025-01-16 | End: 2025-01-16 | Stop reason: SURG

## 2025-01-16 RX ORDER — SODIUM CHLORIDE, SODIUM LACTATE, POTASSIUM CHLORIDE, CALCIUM CHLORIDE 600; 310; 30; 20 MG/100ML; MG/100ML; MG/100ML; MG/100ML
INJECTION, SOLUTION INTRAVENOUS CONTINUOUS
Status: ACTIVE | OUTPATIENT
Start: 2025-01-16 | End: 2025-01-17

## 2025-01-16 RX ORDER — METOCLOPRAMIDE HYDROCHLORIDE 5 MG/ML
10 INJECTION INTRAMUSCULAR; INTRAVENOUS ONCE
Status: COMPLETED | OUTPATIENT
Start: 2025-01-16 | End: 2025-01-16

## 2025-01-16 RX ORDER — EPINEPHRINE 0.1 MG/ML
SYRINGE (ML) INJECTION
Status: DISCONTINUED | OUTPATIENT
Start: 2025-01-16 | End: 2025-01-16 | Stop reason: HOSPADM

## 2025-01-16 RX ORDER — SODIUM CHLORIDE 9 MG/ML
500 INJECTION, SOLUTION INTRAVENOUS ONCE
Status: COMPLETED | OUTPATIENT
Start: 2025-01-16 | End: 2025-01-16

## 2025-01-16 RX ORDER — PANTOPRAZOLE SODIUM 40 MG/10ML
40 INJECTION, POWDER, LYOPHILIZED, FOR SOLUTION INTRAVENOUS ONCE
Status: COMPLETED | OUTPATIENT
Start: 2025-01-16 | End: 2025-01-16

## 2025-01-16 RX ORDER — METHIMAZOLE 5 MG/1
5 TABLET ORAL
Status: DISCONTINUED | OUTPATIENT
Start: 2025-01-16 | End: 2025-01-20 | Stop reason: HOSPADM

## 2025-01-16 RX ORDER — ACETAMINOPHEN 325 MG/1
650 TABLET ORAL EVERY 6 HOURS PRN
Status: DISCONTINUED | OUTPATIENT
Start: 2025-01-16 | End: 2025-01-20 | Stop reason: HOSPADM

## 2025-01-16 RX ADMIN — LIDOCAINE HYDROCHLORIDE 100 MG: 20 INJECTION, SOLUTION EPIDURAL; INFILTRATION; INTRACAUDAL; PERINEURAL at 13:26

## 2025-01-16 RX ADMIN — PANTOPRAZOLE SODIUM 40 MG: 40 INJECTION, POWDER, FOR SOLUTION INTRAVENOUS at 08:47

## 2025-01-16 RX ADMIN — SODIUM CHLORIDE, POTASSIUM CHLORIDE, SODIUM LACTATE AND CALCIUM CHLORIDE: 600; 310; 30; 20 INJECTION, SOLUTION INTRAVENOUS at 11:02

## 2025-01-16 RX ADMIN — METOCLOPRAMIDE 10 MG: 5 INJECTION, SOLUTION INTRAMUSCULAR; INTRAVENOUS at 13:13

## 2025-01-16 RX ADMIN — ACETAMINOPHEN 650 MG: 325 TABLET ORAL at 10:57

## 2025-01-16 RX ADMIN — PROPOFOL 50 MG: 10 INJECTION, EMULSION INTRAVENOUS at 13:36

## 2025-01-16 RX ADMIN — SODIUM CHLORIDE, POTASSIUM CHLORIDE, SODIUM LACTATE AND CALCIUM CHLORIDE: 600; 310; 30; 20 INJECTION, SOLUTION INTRAVENOUS at 13:20

## 2025-01-16 RX ADMIN — PROPOFOL 50 MG: 10 INJECTION, EMULSION INTRAVENOUS at 13:45

## 2025-01-16 RX ADMIN — PROPOFOL 50 MG: 10 INJECTION, EMULSION INTRAVENOUS at 13:33

## 2025-01-16 RX ADMIN — PROPOFOL 100 MG: 10 INJECTION, EMULSION INTRAVENOUS at 13:26

## 2025-01-16 RX ADMIN — PROPOFOL 50 MG: 10 INJECTION, EMULSION INTRAVENOUS at 13:39

## 2025-01-16 RX ADMIN — METHIMAZOLE 5 MG: 5 TABLET ORAL at 10:57

## 2025-01-16 RX ADMIN — PROPOFOL 50 MG: 10 INJECTION, EMULSION INTRAVENOUS at 13:47

## 2025-01-16 RX ADMIN — SODIUM CHLORIDE 500 ML: 9 INJECTION, SOLUTION INTRAVENOUS at 08:47

## 2025-01-16 RX ADMIN — PANTOPRAZOLE SODIUM 8 MG/HR: 40 INJECTION, POWDER, FOR SOLUTION INTRAVENOUS at 10:12

## 2025-01-16 ASSESSMENT — COGNITIVE AND FUNCTIONAL STATUS - GENERAL
MOBILITY SCORE: 21
CLIMB 3 TO 5 STEPS WITH RAILING: A LITTLE
WALKING IN HOSPITAL ROOM: A LITTLE
MOVING TO AND FROM BED TO CHAIR: A LITTLE
DRESSING REGULAR UPPER BODY CLOTHING: A LITTLE
TOILETING: A LITTLE
SUGGESTED CMS G CODE MODIFIER MOBILITY: CJ
DAILY ACTIVITIY SCORE: 20
DRESSING REGULAR LOWER BODY CLOTHING: A LITTLE
HELP NEEDED FOR BATHING: A LITTLE
SUGGESTED CMS G CODE MODIFIER DAILY ACTIVITY: CJ

## 2025-01-16 ASSESSMENT — PAIN SCALES - GENERAL: PAIN_LEVEL: 0

## 2025-01-16 ASSESSMENT — PATIENT HEALTH QUESTIONNAIRE - PHQ9
SUM OF ALL RESPONSES TO PHQ9 QUESTIONS 1 AND 2: 0
2. FEELING DOWN, DEPRESSED, IRRITABLE, OR HOPELESS: NOT AT ALL
1. LITTLE INTEREST OR PLEASURE IN DOING THINGS: NOT AT ALL

## 2025-01-16 ASSESSMENT — LIFESTYLE VARIABLES
HOW MANY TIMES IN THE PAST YEAR HAVE YOU HAD 5 OR MORE DRINKS IN A DAY: 0
CONSUMPTION TOTAL: NEGATIVE
EVER FELT BAD OR GUILTY ABOUT YOUR DRINKING: NO
ON A TYPICAL DAY WHEN YOU DRINK ALCOHOL HOW MANY DRINKS DO YOU HAVE: 0
HAVE PEOPLE ANNOYED YOU BY CRITICIZING YOUR DRINKING: NO
ALCOHOL_USE: NO
EVER HAD A DRINK FIRST THING IN THE MORNING TO STEADY YOUR NERVES TO GET RID OF A HANGOVER: NO
TOTAL SCORE: 0
TOTAL SCORE: 0
HAVE YOU EVER FELT YOU SHOULD CUT DOWN ON YOUR DRINKING: NO
TOTAL SCORE: 0
AVERAGE NUMBER OF DAYS PER WEEK YOU HAVE A DRINK CONTAINING ALCOHOL: 0

## 2025-01-16 ASSESSMENT — ENCOUNTER SYMPTOMS
BLOOD IN STOOL: 1
SPUTUM PRODUCTION: 0
DIZZINESS: 0
PALPITATIONS: 0
ORTHOPNEA: 0
COUGH: 0
HEMOPTYSIS: 0
NAUSEA: 0
CHILLS: 0
VOMITING: 0

## 2025-01-16 ASSESSMENT — FIBROSIS 4 INDEX
FIB4 SCORE: 1.33
FIB4 SCORE: 0.67

## 2025-01-16 ASSESSMENT — SOCIAL DETERMINANTS OF HEALTH (SDOH)
WITHIN THE PAST 12 MONTHS, THE FOOD YOU BOUGHT JUST DIDN'T LAST AND YOU DIDN'T HAVE MONEY TO GET MORE: NEVER TRUE
WITHIN THE PAST 12 MONTHS, YOU WORRIED THAT YOUR FOOD WOULD RUN OUT BEFORE YOU GOT THE MONEY TO BUY MORE: NEVER TRUE
WITHIN THE LAST YEAR, HAVE YOU BEEN KICKED, HIT, SLAPPED, OR OTHERWISE PHYSICALLY HURT BY YOUR PARTNER OR EX-PARTNER?: NO
WITHIN THE LAST YEAR, HAVE YOU BEEN AFRAID OF YOUR PARTNER OR EX-PARTNER?: NO
IN THE PAST 12 MONTHS, HAS THE ELECTRIC, GAS, OIL, OR WATER COMPANY THREATENED TO SHUT OFF SERVICE IN YOUR HOME?: NO
WITHIN THE LAST YEAR, HAVE TO BEEN RAPED OR FORCED TO HAVE ANY KIND OF SEXUAL ACTIVITY BY YOUR PARTNER OR EX-PARTNER?: NO
WITHIN THE LAST YEAR, HAVE YOU BEEN HUMILIATED OR EMOTIONALLY ABUSED IN OTHER WAYS BY YOUR PARTNER OR EX-PARTNER?: NO

## 2025-01-16 ASSESSMENT — PAIN DESCRIPTION - PAIN TYPE
TYPE: ACUTE PAIN;SURGICAL PAIN
TYPE: ACUTE PAIN;SURGICAL PAIN

## 2025-01-16 NOTE — ED NOTES
Meds per admit orders.tylenol given per pt request and home regime. Pt concerned not receiving home losartan. Per dr ricks, med on hold pending egd. Pt and spouse aware of same. Thanked for patience regarding awaiting room

## 2025-01-16 NOTE — ED NOTES
Saline lock with blood draw by er tech. Orders recvd and reviewed with pt and spouse. Aware of pending admit and npo

## 2025-01-16 NOTE — OR NURSING
1352: To PACU from EUS via gurney, awake, denies pain/nausea, respirations spontaneous and non-labored. Abdo soft, + bowel sounds.  1405: No change  1420: s/b Akhil. Stable, meets criteria to transfer to floor.

## 2025-01-16 NOTE — ANESTHESIA POSTPROCEDURE EVALUATION
Patient: Madiha Garrett    Procedure Summary       Date: 01/16/25 Room / Location:  ENDOSCOPIC ULTRASOUND ROOM / SURGERY [facility]    Anesthesia Start: 1320 Anesthesia Stop: 1354    Procedures:       GASTROSCOPY (Abdomen)      EGD, WITH DIRECTED SUBMUCOSAL INJECTION (Abdomen) Diagnosis: (Upper GI bleed)    Surgeons: Rosalie Romero M.D. Responsible Provider: Corinne Holder M.D.    Anesthesia Type: MAC ASA Status: 2            Final Anesthesia Type: MAC  Last vitals  BP   Blood Pressure : (!) 153/85, NIBP: 111/69    Temp   36.8 °C (98.3 °F)    Pulse   93   Resp   16    SpO2   97 %      Anesthesia Post Evaluation    Patient location during evaluation: PACU  Patient participation: complete - patient participated  Level of consciousness: awake  Pain score: 0    Airway patency: patent  Anesthetic complications: no  Cardiovascular status: hemodynamically stable  Respiratory status: acceptable  Hydration status: acceptable    PONV: none          No notable events documented.     Nurse Pain Score: 0 (NPRS)

## 2025-01-16 NOTE — DISCHARGE PLANNING
Met with pt and her . Pt was receiving care from ER RN so CM talked to . Pt has knee surgery on January 7th. Although pt was independent with ADLs and IADLs prior to surgery, pt currently unable to drive so  assist with IADLs. Pt currently using a walker. No HH services were set up.     PCP is Samir Amos MD.   Pharmacy is [facility] on [facility].  will provide transportation upon discharge.     Care Transition Team Assessment    Information Source  Orientation Level: Oriented X4  Information Given By: Patient  Who is responsible for making decisions for patient? : Patient    Readmission Evaluation  Is this a readmission?: No    Elopement Risk  Legal Hold: No  Ambulatory or Self Mobile in Wheelchair: No-Not an Elopement Risk    Interdisciplinary Discharge Planning  Does Admitting Nurse Feel This Could be a Complex Discharge?: No  Primary Care Physician: Dr Samir Amos.  Lives with - Patient's Self Care Capacity: Spouse  Patient or legal guardian wants to designate a caregiver: No  Support Systems: Spouse / Significant Other  Housing / Facility: [address]  Do You Take your Prescribed Medications Regularly: Yes  Able to Return to Previous ADL's: Yes  Mobility Issues: No  Prior Services: Home-Independent  Patient Prefers to be Discharged to:: Home  Assistance Needed: No    Discharge Preparedness  What is your plan after discharge?: Home with help  What are your discharge supports?: Sibling  Prior Functional Level: Ambulatory, Drives Self, Independent with Activities of Daily Living, Independent with Medication Management, Uses Walker  Difficulity with ADLs: Walking  Difficulity with IADLs: Cooking, Driving, Laundry, Shopping    Functional Assesment  Prior Functional Level: Ambulatory, Drives Self, Independent with Activities of Daily Living, Independent with Medication Management, Uses Walker    Finances  Financial Barriers to Discharge: No  Prescription Coverage: Yes    Vision /  Hearing Impairment  Vision Impairment : Yes  Hearing Impairment : No    Values / Beliefs / Concerns  Values / Beliefs Concerns : No    Advance Directive  Advance Directive?: Living Will, POLST    Domestic Abuse  Have you ever been the victim of abuse or violence?: No    Psychological Assessment  History of Substance Abuse: None    Discharge Risks or Barriers  Discharge risks or barriers?: No    Anticipated Discharge Information  Discharge Disposition: Discharged to home/self care (01)

## 2025-01-16 NOTE — CONSULTS
`                                           Gastroenterology Consult Note     Date of Consult: 1/16/2025     Reason for consult: Upper GI bleeding     HPI: 74-year-old female came to the ER with complaints of passing multiple melanotic stool from yesterday morning.  She had about 4 episodes prior to ER visit.  She has been experiencing abdominal pain and some nausea but denies vomiting.  Patient recently underwent left knee surgery 1 week ago after which she took meloxicam for about 3 days.  Soon after she started to feel abdominal discomfort in the form of bloating and indigestion and therefore she stopped the medication and has been just taking Tylenol.  She also took 1 dose of pantoprazole that her  takes.  There is no prior history of peptic ulcer disease.  Initial labs significant for hemoglobin of 10.9 which is about 5 g lower than her baseline that was documented more than a month ago.  She has been hemodynamically stable.  She has not received any transfusions.  She is receiving IV pantoprazole for acid suppression.     PMHX:  Past Medical History:   Diagnosis Date    Arthritis     Bowel habit changes     12/9/24 constipation    Hyperlipidemia     Hypertension     Hyperthyroidism     Graves' disease / thyrotropin receptor antibody = 6.2, hyperthyroid    Urinary incontinence           PSurgHx:   Past Surgical History:   Procedure Laterality Date    ARTHROPLASTY, KNEE, ROBOT-ASSISTED Left 1/7/2025    Procedure: ROBOTIC LEFT TOTAL KNEE ARTHROPLASTY;  Surgeon: David Santa M.D.;  Location: SURGERY [facility];  Service: Ortho Robotic    KNEE REPLACEMENT, TOTAL Right 09/27/2018    Approximate date in 2018 [facility]    HYSTEROSCOPY WITH VIDEO DIAGNOSTIC      ectopic pregnancy, and appendix removal        ALLERGIES:Meloxicam, Tramadol, and Oxycodone    No current facility-administered medications on file prior to encounter.     Current Outpatient Medications on File Prior to  Encounter   Medication Sig Dispense Refill    aspirin (ASA) 81 MG Chew Tab chewable tablet Chew 1 Tablet 2 times a day for 28 days. 56 Tablet 0    losartan (COZAAR) 100 MG Tab TAKE ONE TABLET BY MOUTH EVERY DAY 90 Tablet 0    acetaminophen (TYLENOL) 500 MG Tab Take 1,000 mg by mouth 4 times a day.      ascorbic acid (ASCORBIC ACID) 500 MG Tab Take 500 mg by mouth every day.      vitamin D (CHOLECALCIFEROL) 1000 UNIT Tab Take 1,000 Units by mouth every day.      meloxicam (MOBIC) 7.5 MG Tab Take 1 Tablet by mouth every day for 30 days. (Patient not taking: Reported on 1/16/2025) 30 Tablet 0    methimazole (TAPAZOLE) 5 MG Tab TAKE ONE-HALF TABLET BY MOUTH EVERY DAY 6 DAYS PER WEEK (Patient taking differently: Take 5 mg by mouth two times a week. On Monday & Thursday) 45 Tablet 2          Current Facility-Administered Medications:     lactated ringers infusion, , Intravenous, Continuous, Alex Spangler M.D., Continue to Floor at 01/16/25 1202    [MAR Hold] acetaminophen (Tylenol) tablet 650 mg, 650 mg, Oral, Q6HRS PRN, Alex Spangler M.D., 650 mg at 01/16/25 1057    [MAR Hold] pantoprazole (Protonix) injection 40 mg, 40 mg, Intravenous, Once, Alex Spangler M.D.    pantoprazole (Protonix) 80 mg in  mL continuous infusion, 8 mg/hr, Intravenous, Continuous, Alex Spangler M.D., Continue to Floor at 01/16/25 1205    [MAR Hold] methimazole (Tapazole) tablet 5 mg, 5 mg, Oral, Once per day on Monday Thursday, Alex Spangler M.D., 5 mg at 01/16/25 1057    metoclopramide (Reglan) injection 10 mg, 10 mg, Intravenous, Once, Rosalie Romero M.D.      SocHx:   Social History     Socioeconomic History    Marital status:      Spouse name: Not on file    Number of children: Not on file    Years of education: Not on file    Highest education level: Bachelor's degree (e.g., BA, AB, BS)   Occupational History    Not on file   Tobacco Use    Smoking status: Former     Current packs/day: 0.00     Average packs/day: 0.5  packs/day for 27.2 years (13.6 ttl pk-yrs)     Types: Cigarettes     Start date: 1970     Quit date: 3/1/1997     Years since quittin.8     Passive exposure: Never    Smokeless tobacco: Never   Vaping Use    Vaping status: Never Used   Substance and Sexual Activity    Alcohol use: Yes     Alcohol/week: 1.2 oz     Types: 2 Shots of liquor per week     Comment: 2 per week    Drug use: No    Sexual activity: Yes     Partners: Male   Other Topics Concern    Not on file   Social History Narrative    Not on file     Social Drivers of Health     Financial Resource Strain: Low Risk  (2023)    Overall Financial Resource Strain (CARDIA)     Difficulty of Paying Living Expenses: Not hard at all   Food Insecurity: No Food Insecurity (2/10/2024)    Hunger Vital Sign     Worried About Running Out of Food in the Last Year: Never true     Ran Out of Food in the Last Year: Never true   Transportation Needs: No Transportation Needs (2/10/2024)    PRAPARE - Transportation     Lack of Transportation (Medical): No     Lack of Transportation (Non-Medical): No   Physical Activity: Insufficiently Active (2/10/2024)    Exercise Vital Sign     Days of Exercise per Week: 3 days     Minutes of Exercise per Session: 40 min   Stress: Stress Concern Present (2/10/2024)    [employer] - Occupational Stress Questionnaire     Feeling of Stress : To some extent   Social Connections: Unknown (2/10/2024)    Social Connection and Isolation Panel [NHANES]     Frequency of Communication with Friends and Family: Three times a week     Frequency of Social Gatherings with Friends and Family: Once a week     Attends Hinduism Services: Patient declined     Active Member of Clubs or Organizations: Patient declined     Attends Club or Organization Meetings: Not on file     Marital Status:    Intimate Partner Violence: Not on file   Housing Stability: Unknown (2/10/2024)    Housing Stability Vital Sign     Unable  to Pay for Housing in the Last Year: No     Number of Places Lived in the Last Year: Not on file     Unstable Housing in the Last Year: No        FAMHx:   Family History   Problem Relation Age of Onset    Arthritis Mother     Heart Disease Father         arteriosclerosis    Hypertension Father     Alcohol/Drug Sister     Alcohol/Drug Brother         ROS:  Constitutional: Negative for fever, chills, positive for fatigue  HEENT: Negative for visual changes or epistaxis  CARDIO: Negative for chest pain, palpitations  PULM: Negative for cough, breathing difficulty or hemoptysis  NEURO: Negative for seizure, stroke or mental status changes  GI: as above  : Negative for dysuria or hematuria  HEME: Negative for anemia, thrombocytopenia or easy bruising  MUSCULOSKELETAL: Negative for arthritis, arthralgia or joint deformities  PSYCH: Negative for major depression or other psychiatric illnesses  SKIN: Negative for pruritus     PE:  Vitals:    01/16/25 1010 01/16/25 1020 01/16/25 1152 01/16/25 1159   BP: (!) 153/80   (!) 153/85   Pulse: 77 (!) 105 100 (!) 103   Resp:    16   Temp:       SpO2: (!) 85% 97% 97% 97%   Weight:       Height:           General: Average nourished, alert and oriented, no acute distress.  HEENT: Atraumatic, normocephalic, conjunctiva clear, no jaundice  Neck: No goiter, no cervical or supraclavicular adenopathy  CVS: Regular rate and rhythm,   Pulmonary: Trachea midline, chest expansion symmetrical, normal respiratory effort  Abdomen: Soft, nondistended, nontender, no hepatosplenomegaly, no masses palpable, no ascites  Extremities: No edema  NEURO: Normal speech, moves all extremities, no focal deficit  Skin: No jaundice or rashes  Psychiatry: Normal affect     LABS:  Lab Results   Component Value Date/Time    SODIUM 138 01/16/2025 07:55 AM    POTASSIUM 4.1 01/16/2025 07:55 AM    CHLORIDE 105 01/16/2025 07:55 AM    CO2 21 01/16/2025 07:55 AM    GLUCOSE 148 (H) 01/16/2025 07:55 AM    BUN 39 (H)  01/16/2025 07:55 AM    CREATININE 0.82 01/16/2025 07:55 AM      Lab Results   Component Value Date/Time    WBC 9.2 01/16/2025 07:55 AM    RBC 3.33 (L) 01/16/2025 07:55 AM    HEMOGLOBIN 10.9 (L) 01/16/2025 07:55 AM    HEMATOCRIT 32.8 (L) 01/16/2025 07:55 AM    MCV 98.5 (H) 01/16/2025 07:55 AM    MCH 32.7 01/16/2025 07:55 AM    MCHC 33.2 01/16/2025 07:55 AM    MPV 9.7 01/16/2025 07:55 AM    NEUTSPOLYS 73.20 (H) 01/16/2025 07:55 AM    LYMPHOCYTES 17.20 (L) 01/16/2025 07:55 AM    MONOCYTES 6.90 01/16/2025 07:55 AM    EOSINOPHILS 1.50 01/16/2025 07:55 AM    BASOPHILS 0.50 01/16/2025 07:55 AM        Lab Results   Component Value Date/Time    PROTHROMBTM 13.3 01/16/2025 07:55 AM    INR 0.98 01/16/2025 07:55 AM      Recent Labs     01/16/25  0755   ASTSGOT 12   ALTSGPT 15   TBILIRUBIN 0.5   GLOBULIN 2.8   INR 0.98         IMAGING: Reviewed personally and summarized in HPI  No orders to display           ASSESSMENT:   Upper GI bleeding manifesting as melena most likely secondary to NSAID induced peptic ulcer disease.    PLAN:   For further evaluation and therapeutic intervention if indicated, will proceed with EGD.  Discussed procedure and complications including risk of bleeding due to the intervention.  Patient is willing to proceed as recommended.  Informed consent signed.  Will give 10 mg Reglan IV immediately before EGD for better visualization.      This note was generated using voice recognition software and has a small chance of producing errors of grammar and possibly content.  Reasonable attempt has been made to find and correct any obvious errors but expect that some may not be found prior to finalization of this note.    Rosalie Romero M.D.  [facility]  [phone]

## 2025-01-16 NOTE — ASSESSMENT & PLAN NOTE
1/19:  Blood loss appears to have finally slowed  Hemoglobin stable, has been 24 hours since last abdominal with blood  I discussed with gastroenterology  Advance diet  Change PPI to IV pushes, transition to p.o. PPI tomorrow  Avoid NSAIDs

## 2025-01-16 NOTE — PROCEDURES
Procedure: EGD (Esophagogstroduodenoscopy)    Date of procedure: 1/16/2025    Enodscopist: Rosalie Romero M.D.    Anesthesia: MAC    Anesthesiologist: Corinne Holder MD    Pre-op diagnosis:  Melena    Post-op diagnosis:  Duodenal ulcers with bleeding  Hiatal hernia    Complications: None    Estimated blood loss: None from the procedure    Specimens:  None    Indications:  74-year-old female presenting with melena and acute blood loss anemia.      Description of procedure:  After discussion of indications,risks and benefits including the risks of perforation and bleeding informed consent was signed by the patient. Sedation was administered and documented by anesthesia. Time out was performed.  Patient was placed in the left lateral position and Olympus Video gastroscope was placed in the oral cavity and the esophagus was intubated under direct visualization. The endoscope was then advanced through the lumen of the esophagus into the stomach and passed through to the third portion of duodenum. Upon careful withdrawal of the scope, mucosa  was visualized carefully. Gastric cardia and fundus visualized by retroflexion. Findings and maneuvers as listed below. After deflating the stomach the endoscope was removed and procedure was terminated. Patient tolerated procedure well.    Findings:  Hypopharynx normal  Esophagus: Medium sized hiatal hernia otherwise normal  Stomach: Small amount of coffee-ground material present in the body of the stomach.  Otherwise entire stomach normal without any mucosal abnormalities.  Retroflexed visualization of cardia soft junction normal.  Duodenum: There is a large greater than 20 mm circumferential ulcer present at the superior duodenal flexure.  Active oozing noted from multiple spots from the ulcer bed.  Bleeding was controlled by injecting 8 cc of 1 in 10,000 epinephrine and aliquots of 2 cc each at 4 sites.  Adherent blood clots fair dislodged by water irrigation and ulcer bed was  examined closely.  There were about 3 pigmented protruding spots in the ulcer bed which were cauterized using bipolar gold probe.  Due to location of ulcer, the posterior aspect of the ulcer bed visualization was limited.  However no bleeding was observed from this area during and at the end of the procedure.  Multiple smaller ulcers with clean base present in the second portion.        Plan:  Monitor closely for rebleeding.  In case of significant bleeding, consult interventional radiology for angiogram and embolization of GDA.   Due to increased risk of rebleeding, recommend pantoprazole IV infusion for 72 hours.  Duration could be adjusted based on clinical course.  Keep n.p.o. except for ice chips.  If clinically stable, start p.o. in a.m.  Check stool for H. pylori antigen and treat if positive.

## 2025-01-16 NOTE — H&P
Hospital Medicine History & Physical Note    Date of Service  1/16/2025    Primary Care Physician  Samir Amos D.O.    Consultants  GI    Code Status  Full Code    Chief Complaint  Chief Complaint   Patient presents with    Bloody Stools     X 24 hrs       History of Presenting Illness  Madiha Garrett is a 74 y.o. female who presented 1/16/2025 with dark bloody stools.    I have reviewed some of the recent provider documentation available to me in the patient's medical chart.  Records are briefly summarized: Madiha Garrett has past medical history that includes osteoarthritis.  Patient underwent scheduled left knee arthroplasty on 1/7/2025.  She was discharged home and there were no complications.    Over the past 24 hours the patient has developed bloody stools.  She reports multiple episodes of dark bloody stools.  She has no abdominal pain, she has not recently been ill, she does not take NSAIDs but has been taking Mobic as prescribed.  Patient does not currently drink alcohol.  Evaluation in the emergency room shows significant drop in her hemoglobin and the patient is tachycardic    I discussed plan of care with gastroenterology, we discussed patient's vital signs.  She will go for EGD today    I discussed the plan of care with GI.    Review of Systems  Review of Systems   Constitutional:  Negative for chills and malaise/fatigue.   Respiratory:  Negative for cough, hemoptysis and sputum production.    Cardiovascular:  Negative for chest pain, palpitations and orthopnea.   Gastrointestinal:  Positive for blood in stool. Negative for nausea and vomiting.   Skin:  Negative for itching and rash.   Neurological:  Negative for dizziness.   All other systems reviewed and are negative.      Past Medical History   has a past medical history of Arthritis, Bowel habit changes, Hyperlipidemia, Hypertension, Hyperthyroidism, and Urinary incontinence.    Surgical History   has a past surgical history that includes  hysteroscopy with video diagnostic; knee replacement, total (Right, 09/27/2018); and arthroplasty, knee, robot-assisted (Left, 1/7/2025).     Family History  family history includes Alcohol/Drug in her brother and sister; Arthritis in her mother; Heart Disease in her father; Hypertension in her father.   Family history reviewed with patient. There is no family history that is pertinent to the chief complaint.     Social History   reports that she quit smoking about 27 years ago. Her smoking use included cigarettes. She started smoking about 55 years ago. She has a 13.6 pack-year smoking history. She has never been exposed to tobacco smoke. She has never used smokeless tobacco. She reports current alcohol use of about 1.2 oz of alcohol per week. She reports that she does not use drugs.    Allergies  Allergies   Allergen Reactions    Meloxicam Nausea    Tramadol Nausea    Oxycodone Vomiting and Nausea       Medications  Prior to Admission Medications   Prescriptions Last Dose Informant Patient Reported? Taking?   acetaminophen (TYLENOL) 500 MG Tab 1/15/2025 at  6:00 AM Patient Yes Yes   Sig: Take 1,000 mg by mouth 4 times a day.   ascorbic acid (ASCORBIC ACID) 500 MG Tab 1/15/2025 at  6:00 AM Patient Yes Yes   Sig: Take 500 mg by mouth every day.   aspirin (ASA) 81 MG Chew Tab chewable tablet 1/15/2025 at  6:00 AM Patient No Yes   Sig: Chew 1 Tablet 2 times a day for 28 days.   losartan (COZAAR) 100 MG Tab 1/15/2025 at  6:00 AM Patient No Yes   Sig: TAKE ONE TABLET BY MOUTH EVERY DAY   meloxicam (MOBIC) 7.5 MG Tab Not Taking Patient No No   Sig: Take 1 Tablet by mouth every day for 30 days.   Patient not taking: Reported on 1/16/2025   methimazole (TAPAZOLE) 5 MG Tab 1/13/2025 Patient No No   Sig: TAKE ONE-HALF TABLET BY MOUTH EVERY DAY 6 DAYS PER WEEK   Patient taking differently: Take 5 mg by mouth two times a week. On Monday & Thursday   vitamin D (CHOLECALCIFEROL) 1000 UNIT Tab 1/15/2025 Patient Yes Yes   Sig:  Take 1,000 Units by mouth every day.      Facility-Administered Medications: None       Physical Exam  Temp:  [36.4 °C (97.6 °F)-36.8 °C (98.3 °F)] 36.6 °C (97.9 °F)  Pulse:  [] 94  Resp:  [14-20] 18  BP: (113-158)/(80-96) 150/85  SpO2:  [85 %-99 %] 95 %  Blood Pressure : (!) 150/85   Temperature: 36.6 °C (97.9 °F)   Pulse: 94   Respiration: 18   Pulse Oximetry: 95 %       Physical Exam  Constitutional:       General: She is not in acute distress.     Appearance: Normal appearance. She is normal weight.   HENT:      Head: Normocephalic and atraumatic.      Right Ear: External ear normal.      Left Ear: External ear normal.      Nose: Nose normal.   Eyes:      Extraocular Movements: Extraocular movements intact.   Cardiovascular:      Rate and Rhythm: Regular rhythm. Tachycardia present.      Pulses: Normal pulses.   Pulmonary:      Effort: Pulmonary effort is normal.      Breath sounds: Normal breath sounds.   Abdominal:      General: Abdomen is flat. Bowel sounds are normal.      Palpations: Abdomen is soft.   Musculoskeletal:         General: Normal range of motion.      Cervical back: Normal range of motion and neck supple.      Comments: Left knee dressing/incision clean dry and intact   Skin:     General: Skin is warm and dry.   Neurological:      General: No focal deficit present.      Mental Status: She is alert and oriented to person, place, and time.      Cranial Nerves: No cranial nerve deficit.   Psychiatric:         Mood and Affect: Mood normal.         Behavior: Behavior normal.         Laboratory:  Recent Labs     01/16/25  0755   WBC 9.2   RBC 3.33*   HEMOGLOBIN 10.9*   HEMATOCRIT 32.8*   MCV 98.5*   MCH 32.7   MCHC 33.2   RDW 48.2   PLATELETCT 340   MPV 9.7     Recent Labs     01/16/25  0755   SODIUM 138   POTASSIUM 4.1   CHLORIDE 105   CO2 21   GLUCOSE 148*   BUN 39*   CREATININE 0.82   CALCIUM 8.7     Recent Labs     01/16/25  0755   ALTSGPT 15   ASTSGOT 12   ALKPHOSPHAT 52   TBILIRUBIN 0.5  "  GLUCOSE 148*     Recent Labs     01/16/25  0755   INR 0.98     No results for input(s): \"NTPROBNP\" in the last 72 hours.      No results for input(s): \"TROPONINT\" in the last 72 hours.    Imaging:  No orders to display           Assessment/Plan:  Justification for Admission Status  I anticipate this patient will require at least two midnights for appropriate medical management, necessitating inpatient admission because the patient has a hemodynamically significant GI bleed, she is tachycardic and is expected to require greater than 2 midnights of care .n    Patient will need a Telemetry bed on MEDICAL service .  The need is secondary to tachycardia.    * GI bleed- (present on admission)  Assessment & Plan  Patient presents with dark bloody stools  Concern for significant upper GI bleed  She has had a significant drop in her hemoglobin, she will be admitted to telemetry for close monitoring  Obtain 2 peripheral IVs  Continuous PPI drip  Check hemoglobin every 8 hours  Avoid NSAIDs, Victor 2 inhibitors  GI consulted, the patient will go for EGD today    Acute blood loss anemia- (present on admission)  Assessment & Plan  Due to GI bleed  Check hemoglobin every 8 hours  Check TEG  Transfuse for hemoglobin less than 7      Tachycardia- (present on admission)  Assessment & Plan  Tachycardia likely secondary to acute GI bleeding  Monitor on telemetry    Essential hypertension- (present on admission)  Assessment & Plan  Hold outpatient antihypertensives due to presumed volume loss and risk of hypotension    Hyperthyroidism- (present on admission)  Assessment & Plan  Methimazole        VTE prophylaxis: SCDs/TEDs  "

## 2025-01-16 NOTE — OR NURSING
Patient allergies and NPO status verified. Patient verbalizes understanding of pain scale, expected course of stay and plan of care. Surgical site verified with patient. IV assessed for patency.

## 2025-01-16 NOTE — ANESTHESIA TIME REPORT
Anesthesia Start and Stop Event Times       Date Time Event    1/16/2025 1226 Ready for Procedure     1320 Anesthesia Start     1354 Anesthesia Stop          Responsible Staff  01/16/25      Name Role Begin End    Corinne Holder M.D. Anesth 1320 1354          Overtime Reason:  no overtime (within assigned shift)    Comments:

## 2025-01-16 NOTE — ED NOTES
Pharmacy Medication Reconciliation      ~Medication reconciliation updated and complete per patient at bedside   ~Allergies have been verified and updated   ~No oral ABX within the last 30 days  ~Patient home pharmacy :  [facility]      ~Anticoagulants (rivaroxaban, apixaban, edoxaban, dabigatran, warfarin, enoxaparin) taken in the last 14 days? No

## 2025-01-16 NOTE — ED TRIAGE NOTES
Pt amb to room with walker, s/p left total knee replacement with dr burgess 1/7/25 with c/o painless bloody stools since yesterday.  Erp to bedside, poc reviewed with same

## 2025-01-16 NOTE — ANESTHESIA PREPROCEDURE EVALUATION
Case: [id number] Date/Time: 01/16/25 1300    Procedure: GASTROSCOPY    Location:  ENDOSCOPIC ULTRASOUND ROOM / SURGERY [facility]    Surgeons: Rosalie Romero M.D.          75 yo w/bloody stools and rectal bleeding.  Denies N/V or abdominal pain    Relevant Problems   ANESTHESIA   (positive) PONV (postoperative nausea and vomiting)      CARDIAC   (positive) Essential hypertension      ENDO   (positive) Hyperthyroidism     Pre DM?    Denies CAD, CP/SOB, CVA, LUNG DZ, GERD, URI    Physical Exam    Airway   Mallampati: II  TM distance: >3 FB  Neck ROM: full       Cardiovascular   Rhythm: regular  Rate: normal  (-) murmur     Dental - normal exam           Pulmonary   Breath sounds clear to auscultation     Abdominal    Neurological - normal exam                   Anesthesia Plan    ASA 2       Plan - MAC               Induction: intravenous      Pertinent diagnostic labs and testing reviewed    Informed Consent:    Anesthetic plan and risks discussed with patient.    Use of blood products discussed with: patient whom consented to blood products.

## 2025-01-16 NOTE — ED PROVIDER NOTES
ED Provider Note    CHIEF COMPLAINT  Chief Complaint   Patient presents with    Bloody Stools     X 24 hrs         EXTERNAL RECORDS REVIEWED  Inpatient Notes patient underwent robotic left total hip by Dr. Santa 1/7/2025, discharged home on oxycodone acetaminophen tramadol and meloxicam    HPI/ROS    OUTSIDE HISTORIAN(S):  Significant other who has a sample of his wife's stool with him, it is grossly bloody and black    Madiha Garrett is a 74 y.o. female who presents with chief complaint of multiple dark red or black stools since yesterday morning.  Patient recently underwent total hip replacement 1/7/2025, was discharged home on oxycodone, acetaminophen, tramadol and meloxicam.  Patient states that she only took these medications for about 3 days as she started having some stomach upset nausea.  Yesterday morning she started developing some very dark stool, it looked like blood.  She had about 5 relatively large bowel movements since that time.  As symptoms persisted she decided to come to the emergency department.  Patient denies any ongoing abdominal pain.  She denies any use of anticoagulants but does take a baby aspirin every day.  Patient denies any associated fevers or chills.  Patient denies any nausea or vomiting.  She denies any bleeding elsewhere.  She denies any history of GI bleed.  She last had a colonoscopy which was a screening exam by [facility] approximately 2 years ago.  She is unsure which provider.      PAST MEDICAL HISTORY   has a past medical history of Arthritis, Bowel habit changes, Hyperlipidemia, Hypertension, Hyperthyroidism, and Urinary incontinence.    SURGICAL HISTORY   has a past surgical history that includes hysteroscopy with video diagnostic; knee replacement, total (Right, 09/27/2018); and arthroplasty, knee, robot-assisted (Left, 1/7/2025).    FAMILY HISTORY  Family History   Problem Relation Age of Onset    Arthritis Mother     Heart Disease Father          "arteriosclerosis    Hypertension Father     Alcohol/Drug Sister     Alcohol/Drug Brother        SOCIAL HISTORY  Social History     Tobacco Use    Smoking status: Former     Current packs/day: 0.00     Average packs/day: 0.5 packs/day for 27.2 years (13.6 ttl pk-yrs)     Types: Cigarettes     Start date: 1970     Quit date: 3/1/1997     Years since quittin.8     Passive exposure: Never    Smokeless tobacco: Never   Vaping Use    Vaping status: Never Used   Substance and Sexual Activity    Alcohol use: Yes     Alcohol/week: 1.2 oz     Types: 2 Shots of liquor per week     Comment: 2 per week    Drug use: No    Sexual activity: Yes     Partners: Male       CURRENT MEDICATIONS  Home Medications    **Home medications have not yet been reviewed for this encounter**         ALLERGIES  Allergies   Allergen Reactions    Oxycodone Vomiting and Nausea       PHYSICAL EXAM  VITAL SIGNS: /87   Pulse (!) 120   Temp 36.4 °C (97.6 °F)   Resp 20   Ht 1.676 m (5' 6\")   Wt 94.7 kg (208 lb 12.4 oz)   SpO2 96%   BMI 33.70 kg/m²    Physical Exam  Constitutional:       Appearance: She is well-developed.   HENT:      Head: Normocephalic and atraumatic.   Eyes:      Pupils: Pupils are equal, round, and reactive to light.   Cardiovascular:      Rate and Rhythm: Normal rate and regular rhythm.   Pulmonary:      Effort: Pulmonary effort is normal. No accessory muscle usage or respiratory distress.      Breath sounds: Normal breath sounds.   Abdominal:      General: Bowel sounds are normal.      Palpations: Abdomen is soft. There is no mass.      Tenderness: There is no abdominal tenderness. There is no guarding or rebound.   Genitourinary:     Comments: Gross melena on exam  Musculoskeletal:         General: Normal range of motion.   Skin:     General: Skin is warm.      Capillary Refill: Capillary refill takes less than 2 seconds.   Neurological:      General: No focal deficit present.      Mental Status: She is alert. "   Psychiatric:         Mood and Affect: Mood normal. Mood is not anxious.           EKG/LABS  Results for orders placed or performed during the hospital encounter of 01/16/25   CBC WITH DIFFERENTIAL    Collection Time: 01/16/25  7:55 AM   Result Value Ref Range    WBC 9.2 4.8 - 10.8 K/uL    RBC 3.33 (L) 4.20 - 5.40 M/uL    Hemoglobin 10.9 (L) 12.0 - 16.0 g/dL    Hematocrit 32.8 (L) 37.0 - 47.0 %    MCV 98.5 (H) 81.4 - 97.8 fL    MCH 32.7 27.0 - 33.0 pg    MCHC 33.2 32.2 - 35.5 g/dL    RDW 48.2 35.9 - 50.0 fL    Platelet Count 340 164 - 446 K/uL    MPV 9.7 9.0 - 12.9 fL    Neutrophils-Polys 73.20 (H) 44.00 - 72.00 %    Lymphocytes 17.20 (L) 22.00 - 41.00 %    Monocytes 6.90 0.00 - 13.40 %    Eosinophils 1.50 0.00 - 6.90 %    Basophils 0.50 0.00 - 1.80 %    Immature Granulocytes 0.70 0.00 - 0.90 %    Nucleated RBC 0.00 0.00 - 0.20 /100 WBC    Neutrophils (Absolute) 6.75 1.82 - 7.42 K/uL    Lymphs (Absolute) 1.59 1.00 - 4.80 K/uL    Monos (Absolute) 0.64 0.00 - 0.85 K/uL    Eos (Absolute) 0.14 0.00 - 0.51 K/uL    Baso (Absolute) 0.05 0.00 - 0.12 K/uL    Immature Granulocytes (abs) 0.06 0.00 - 0.11 K/uL    NRBC (Absolute) 0.00 K/uL   CMP    Collection Time: 01/16/25  7:55 AM   Result Value Ref Range    Sodium 138 135 - 145 mmol/L    Potassium 4.1 3.6 - 5.5 mmol/L    Chloride 105 96 - 112 mmol/L    Co2 21 20 - 33 mmol/L    Anion Gap 12.0 7.0 - 16.0    Glucose 148 (H) 65 - 99 mg/dL    Bun 39 (H) 8 - 22 mg/dL    Creatinine 0.82 0.50 - 1.40 mg/dL    Calcium 8.7 8.4 - 10.2 mg/dL    Correct Calcium 9.3 8.5 - 10.5 mg/dL    AST(SGOT) 12 12 - 45 U/L    ALT(SGPT) 15 2 - 50 U/L    Alkaline Phosphatase 52 30 - 99 U/L    Total Bilirubin 0.5 0.1 - 1.5 mg/dL    Albumin 3.3 3.2 - 4.9 g/dL    Total Protein 6.1 6.0 - 8.2 g/dL    Globulin 2.8 1.9 - 3.5 g/dL    A-G Ratio 1.2 g/dL   PT/INR    Collection Time: 01/16/25  7:55 AM   Result Value Ref Range    PT 13.3 12.0 - 14.6 sec    INR 0.98 0.87 - 1.13   ABO Rh Confirm    Collection Time:  01/16/25  7:55 AM   Result Value Ref Range    ABO Rh Confirm A POS    ESTIMATED GFR    Collection Time: 01/16/25  7:55 AM   Result Value Ref Range    GFR (CKD-EPI) 75 >60 mL/min/1.73 m 2   COD - Adult (Type and Screen)    Collection Time: 01/16/25  8:03 AM   Result Value Ref Range    ABO Grouping Only A     Rh Grouping Only POS     Antibody Screen-Cod NEG      *Note: Due to a large number of results and/or encounters for the requested time period, some results have not been displayed. A complete set of results can be found in Results Review.       I have independently interpreted this EKG            COURSE & MEDICAL DECISION MAKING    ASSESSMENT, COURSE AND PLAN  Care Narrative: Patient here with symptoms consistent with GI bleed.  Suspect possible upper GI bleed given the color of the stool and patient's recent exposure to NSAID.  Will cover with pantoprazole.  Certainly a lower GI bleed is possible as well.  Will check basic labs including CBC to ensure patient is not severely anemic, will transfuse as necessary.  Checking CMP and INR to evaluate for any evidence of coagulopathy though my suspicion of this is low.  Patient case will be discussed with the [facility].   Basic labs reveal patient's hemoglobin is dropped from 15-11, this to be consistent with blood loss anemia secondary to likely upper GI bleed.  I discussed the case with gastroenterology who will take patient for endoscopy.  Patient remains hemodynamically stable, tachycardia significantly improved.  Patient case discussed with hospitalist who has agreed to admit.              DISPOSITION AND DISCUSSIONS  I have discussed management of the patient with the following physicians and MARY's: Hospitalist Dr. Spangler,  GI from [facility]    Escalation of care considered, and ultimately not performed:diagnostic imaging was deferred, patient with entirely benign abdominal exam, surgical process thought to be unlikely, hemodynamically stable, my suspicion of emergent need for  interventional radiology embolization of vessel was very low and therefore CT was deferred        Decision tools and prescription drugs considered including, but not limited to: Octreotide was deferred as patient without any history of cirrhosis or evidence of stigmata to suggest cirrhosis    FINAL DIAGNOSIS  1. Acute GI bleeding

## 2025-01-16 NOTE — ED NOTES
Pt states she's having rectal bleeding with heavy dark blood in the stool (see  for example). PT has been voiding more frequently.

## 2025-01-17 PROBLEM — K26.9 DUODENAL ULCER: Status: ACTIVE | Noted: 2025-01-17

## 2025-01-17 LAB
BARCODED ABORH UBTYP: 9500
BARCODED PRD CODE UBPRD: NORMAL
BARCODED UNIT NUM UBUNT: NORMAL
COMPONENT P 8504P: NORMAL
HGB BLD-MCNC: 7.3 G/DL (ref 12–16)
HGB BLD-MCNC: 7.7 G/DL (ref 12–16)
HGB BLD-MCNC: 7.8 G/DL (ref 12–16)
PRODUCT TYPE UPROD: NORMAL
UNIT STATUS USTAT: NORMAL

## 2025-01-17 PROCEDURE — 770020 HCHG ROOM/CARE - TELE (206)

## 2025-01-17 PROCEDURE — 700111 HCHG RX REV CODE 636 W/ 250 OVERRIDE (IP): Performed by: HOSPITALIST

## 2025-01-17 PROCEDURE — 700105 HCHG RX REV CODE 258: Performed by: HOSPITALIST

## 2025-01-17 PROCEDURE — 700102 HCHG RX REV CODE 250 W/ 637 OVERRIDE(OP): Performed by: HOSPITALIST

## 2025-01-17 PROCEDURE — 30233N1 TRANSFUSION OF NONAUTOLOGOUS RED BLOOD CELLS INTO PERIPHERAL VEIN, PERCUTANEOUS APPROACH: ICD-10-PCS | Performed by: HOSPITALIST

## 2025-01-17 PROCEDURE — 99291 CRITICAL CARE FIRST HOUR: CPT | Performed by: HOSPITALIST

## 2025-01-17 PROCEDURE — 36415 COLL VENOUS BLD VENIPUNCTURE: CPT

## 2025-01-17 PROCEDURE — P9034 PLATELETS, PHERESIS: HCPCS

## 2025-01-17 PROCEDURE — 30233R1 TRANSFUSION OF NONAUTOLOGOUS PLATELETS INTO PERIPHERAL VEIN, PERCUTANEOUS APPROACH: ICD-10-PCS | Performed by: HOSPITALIST

## 2025-01-17 PROCEDURE — P9016 RBC LEUKOCYTES REDUCED: HCPCS

## 2025-01-17 PROCEDURE — 36430 TRANSFUSION BLD/BLD COMPNT: CPT

## 2025-01-17 PROCEDURE — A9270 NON-COVERED ITEM OR SERVICE: HCPCS | Performed by: HOSPITALIST

## 2025-01-17 PROCEDURE — 85018 HEMOGLOBIN: CPT

## 2025-01-17 PROCEDURE — 86923 COMPATIBILITY TEST ELECTRIC: CPT

## 2025-01-17 RX ADMIN — PANTOPRAZOLE SODIUM 8 MG/HR: 40 INJECTION, POWDER, FOR SOLUTION INTRAVENOUS at 03:36

## 2025-01-17 RX ADMIN — SODIUM CHLORIDE, POTASSIUM CHLORIDE, SODIUM LACTATE AND CALCIUM CHLORIDE: 600; 310; 30; 20 INJECTION, SOLUTION INTRAVENOUS at 05:15

## 2025-01-17 RX ADMIN — ACETAMINOPHEN 650 MG: 325 TABLET ORAL at 07:34

## 2025-01-17 RX ADMIN — ACETAMINOPHEN 650 MG: 325 TABLET ORAL at 19:23

## 2025-01-17 ASSESSMENT — ENCOUNTER SYMPTOMS
ORTHOPNEA: 0
VOMITING: 0
HEMOPTYSIS: 0
BLOOD IN STOOL: 1
PALPITATIONS: 0
DIZZINESS: 0
SPUTUM PRODUCTION: 0
NAUSEA: 0
CHILLS: 0
COUGH: 0

## 2025-01-17 ASSESSMENT — PAIN DESCRIPTION - PAIN TYPE
TYPE: ACUTE PAIN;SURGICAL PAIN
TYPE: ACUTE PAIN;SURGICAL PAIN
TYPE: ACUTE PAIN

## 2025-01-17 NOTE — PROGRESS NOTES
Pt to the floor, VSS on RA.  Able to ambulate to the bed from the gurney.   Denies pain;  at her side, attentive.      1554:    Notified provider of two large,  dark, bloody Bms    Provider to the bedside to assess patient

## 2025-01-17 NOTE — CARE PLAN
The patient is Watcher - Medium risk of patient condition declining or worsening    Shift Goals  Clinical Goals: Monitor bleeding, monitor H&H  Patient Goals: Rest  Family Goals: MARE    Progress made toward(s) clinical / shift goals:    Problem: Fall Risk  Goal: Patient will remain free from falls  Description: Target End Date:  Prior to discharge or change in level of care    Document interventions on the Violeta Cat Fall Risk Assessment    1.  Assess for fall risk factors  2.  Implement fall precautions  Outcome: Progressing  Note: Patient remains free of falls this shift. Patient calls for assistance. Patient uses a FWW for stability      Problem: Knowledge Deficit - Standard  Goal: Patient and family/care givers will demonstrate understanding of plan of care, disease process/condition, diagnostic tests and medications  Description: Target End Date:  1-3 days or as soon as patient condition allows    Document in Patient Education    1.  Patient and family/caregiver oriented to unit, equipment, visitation policy and means for communicating concern  2.  Complete/review Learning Assessment  3.  Assess knowledge level of disease process/condition, treatment plan, diagnostic tests and medications  4.  Explain disease process/condition, treatment plan, diagnostic tests and medications  Outcome: Progressing  Note: Plan of care discussed with patient. Patient understands the plan of care.     Problem: Bowel Elimination  Goal: Establish and maintain regular bowel function  Description: Target End Date:  Prior to discharge or change in level of care    1.   Note date of last BM  2.   Educate about diet, fluid intake, medication and activity to promote bowel function  3.   Educate signs and symptoms of constipation and interventions to implement  4.   Pharmacologic bowel management per provider order  5.   Regular toileting schedule  6.   Upright position for toileting  7.   High fiber diet  8.   Encourage hydration  9.    Collaborate with Clinical Dietician  10. Care and maintenance of ostomy if applicable  Outcome: Progressing  Note: Patient had a small Bowel movement this shift. BM was still not formed and contained blood. However, it was smaller and less blood when compared to prior BM's        Patient is not progressing towards the following goals:

## 2025-01-17 NOTE — PROGRESS NOTES
Hospital Medicine Daily Progress Note    Date of Service  1/17/2025    Chief Complaint  Madiha Garrett is a 74 y.o. female admitted 1/16/2025 with dark bloody stools    Hospital Course  Madiha Garrett has past while she was includes osteoarthritis.  She underwent scheduled left knee arthroplasty on 1/7/2025.  The patient was discharged home and there were no complications.  The patient developed dark bloody stools and presented to the emergency room on 1/16/2025 with GI bleed.  Initially evaluation was concerning for significant drop in hemoglobin as well as tachycardia.  Patient was hospitalized, GI was consulted, the patient underwent a EGD which showed a large duodenal ulcer with active bleeding.  This was treated with epinephrine.  Overall it was felt that the risk of rebleeding was quite high.  Extended course of intravenous proton pump inhibitor infusion recommended.    Interval Problem Update  Patient seen and examined  Her  is at bedside  Patient is not feeling dizzy or lightheaded  After the procedure, the patient did have a bloody bowel movement.  This has slowed  No nausea vomiting, no emesis  Results of serial hemoglobin tests reviewed  Results of serial hemoglobin checks reviewed, if remains stable will advance diet today    Patient is critically ill with life threatening blood loss anemia requiring management of blood products to prevent cardiovascular collapse. Serial labs have and will be evaluated with blood products give based on results.  Including 1 unit of PRBC's and 1 unit of platelets today.  Total of 40 minutes of critical care time spent with patient, discussed on rounds with RN, pharmacy, and RT. This time is exclusive of any procedures performed and does not overlap with other physician's time.     I have discussed this patient's plan of care and discharge plan at IDT rounds today with Case Management, Nursing, Nursing leadership, and other members of the IDT  team.    Consultants/Specialty  GI    Code Status  Full Code    Disposition  The patient is not medically cleared for discharge to home or a post-acute facility.  Anticipate discharge to: home with close outpatient follow-up    I have placed the appropriate orders for post-discharge needs.    Review of Systems  Review of Systems   Constitutional:  Negative for chills and malaise/fatigue.   Respiratory:  Negative for cough, hemoptysis and sputum production.    Cardiovascular:  Negative for chest pain, palpitations and orthopnea.   Gastrointestinal:  Positive for blood in stool. Negative for nausea and vomiting.   Skin:  Negative for itching and rash.   Neurological:  Negative for dizziness.   All other systems reviewed and are negative.       Physical Exam  Temp:  [36.5 °C (97.7 °F)-37.1 °C (98.7 °F)] 36.5 °C (97.7 °F)  Pulse:  [] 94  Resp:  [14-18] 16  BP: (106-158)/(71-93) 130/81  SpO2:  [85 %-100 %] 100 %    Physical Exam  Constitutional:       General: She is not in acute distress.     Appearance: Normal appearance. She is normal weight.   HENT:      Head: Normocephalic and atraumatic.      Right Ear: External ear normal.      Left Ear: External ear normal.      Nose: Nose normal.   Eyes:      Extraocular Movements: Extraocular movements intact.   Cardiovascular:      Rate and Rhythm: Regular rhythm. Tachycardia present.      Pulses: Normal pulses.   Pulmonary:      Effort: Pulmonary effort is normal.      Breath sounds: Normal breath sounds.   Abdominal:      General: Abdomen is flat. Bowel sounds are normal.      Palpations: Abdomen is soft.   Musculoskeletal:         General: Normal range of motion.      Cervical back: Normal range of motion and neck supple.      Comments: Left knee dressing/incision clean dry and intact   Skin:     General: Skin is warm and dry.   Neurological:      General: No focal deficit present.      Mental Status: She is alert and oriented to person, place, and time.      Cranial  Nerves: No cranial nerve deficit.   Psychiatric:         Mood and Affect: Mood normal.         Behavior: Behavior normal.         Fluids    Intake/Output Summary (Last 24 hours) at 1/17/2025 0846  Last data filed at 1/17/2025 0756  Gross per 24 hour   Intake 770.83 ml   Output 5 ml   Net 765.83 ml        Laboratory  Recent Labs     01/16/25  0755 01/16/25  1628 01/17/25  0023   WBC 9.2 15.3*  --    RBC 3.33* 2.69*  --    HEMOGLOBIN 10.9* 8.8* 7.8*   HEMATOCRIT 32.8* 27.7*  --    MCV 98.5* 103.0*  --    MCH 32.7 32.7  --    MCHC 33.2 31.8*  --    RDW 48.2 50.9*  --    PLATELETCT 340 289  --    MPV 9.7 9.8  --      Recent Labs     01/16/25  0755   SODIUM 138   POTASSIUM 4.1   CHLORIDE 105   CO2 21   GLUCOSE 148*   BUN 39*   CREATININE 0.82   CALCIUM 8.7     Recent Labs     01/16/25  0755   INR 0.98               Imaging  No orders to display        Assessment/Plan  * Duodenal ulcer- (present on admission)  Assessment & Plan  1/17:  Results of EGD discussed with gastroenterology  Patient has a large duodenal ulcer with active bleeding, treated with epinephrine, high risk of rebleeding  Continue hospital care and intravenous PPI for 72 hours  Advance diet as per GI  Monitor hemoglobin every 8 hours    Acute blood loss anemia- (present on admission)  Assessment & Plan  1/17:  Monitor hemoglobin every 8 hours  Patient is symptomatic with tachycardia  Hemoglobin drifting down  Transfuse 1 unit packed red blood cells  Results of TEG reviewed, she has platelet dysfunction  Transfused 1 unit of platelets as well    Tachycardia- (present on admission)  Assessment & Plan  Tachycardia likely secondary to acute GI bleeding  Monitor on telemetry    GI bleed- (present on admission)  Assessment & Plan  1/17:  As above    Essential hypertension- (present on admission)  Assessment & Plan  Hold outpatient antihypertensives due to presumed volume loss and risk of hypotension    Hyperthyroidism- (present on admission)  Assessment &  Plan  Methimazole         VTE prophylaxis:   SCDs/TEDs      I have performed a physical exam and reviewed and updated ROS and Plan today (1/17/2025). In review of yesterday's note (1/16/2025), there are no changes except as documented above.

## 2025-01-17 NOTE — CARE PLAN
The patient is Stable - Low risk of patient condition declining or worsening       Progress made toward(s) clinical / shift goals:    Problem: Knowledge Deficit - Standard  Goal: Patient and family/care givers will demonstrate understanding of plan of care, disease process/condition, diagnostic tests and medications  Outcome: Progressing  Note: Document in Patient Education 1. Patient and family/caregiver oriented to unit, equipment, visitation policy and means for communicating concern 2. Complete/review Learning Assessment 3. Assess knowledge level of disease process/condition, treatment plan, diagnostic tests and medications 4. Explain disease process/condition, treatment plan, diagnostic tests and medications      Problem: Fall Risk  Goal: Patient will remain free from falls  Outcome: Progressing     Problem: Hemodynamics  Goal: Patient's hemodynamics, fluid balance and neurologic status will be stable or improve  Outcome: Progressing  . Monitor vital signs, pulse oximetry and cardiac monitor per provider order and/or policy 2. Maintain blood pressure per provider order 3. Hemodynamic monitoring per provider order 4. Manage IV fluids and IV infusions 5. Monitor intake and output 6. Daily weights per unit policy or provider order 7. Assess peripheral pulses and capillary refill 8. Assess color and body temperature 9. Position patient for maximum circulation/cardiac output 10. Monitor for signs/symptoms of excessive bleeding 11. Assess mental status, restlessness and changes in level of consciousness 12. Monitor temperature and report fever or hypothermia to provider immediately. Consideration of targeted temperature management.        Patient is not progressing towards the following goals:  NA

## 2025-01-17 NOTE — PROGRESS NOTES
Telemetry Shift Summary     Per monitor tech:  Rhythm SR-ST  HR Range   Ectopy R PVC  Measurements 0.16/0.08/0.32      Normal Values  Rhythm SR  HR Range:   Measurements: 0.12-0.20/0.06-0.10/0.30-0.52

## 2025-01-17 NOTE — HOSPITAL COURSE
Madiha Garrett has past while she was includes osteoarthritis.  She underwent scheduled left knee arthroplasty on 1/7/2025.  The patient was discharged home and there were no complications.  The patient developed dark bloody stools and presented to the emergency room on 1/16/2025 with GI bleed.  Initially evaluation was concerning for significant drop in hemoglobin as well as tachycardia.  Patient was hospitalized, GI was consulted, the patient underwent a EGD which showed a large duodenal ulcer with active bleeding.  This was treated with epinephrine.  Overall it was felt that the risk of rebleeding was quite high.  Extended course of intravenous proton pump inhibitor infusion recommended.

## 2025-01-17 NOTE — PROGRESS NOTES
Telemetry summary     Rhythm: SR  Rate: 70-94  Ectopy:  none  Measurements: 0.18/0.06/0.36

## 2025-01-18 LAB
HCT VFR BLD AUTO: 24.5 % (ref 37–47)
HCT VFR BLD AUTO: 25.4 % (ref 37–47)
HGB BLD-MCNC: 6.8 G/DL (ref 12–16)
HGB BLD-MCNC: 7.9 G/DL (ref 12–16)
HGB BLD-MCNC: 8.3 G/DL (ref 12–16)

## 2025-01-18 PROCEDURE — A9270 NON-COVERED ITEM OR SERVICE: HCPCS | Performed by: HOSPITALIST

## 2025-01-18 PROCEDURE — 770020 HCHG ROOM/CARE - TELE (206)

## 2025-01-18 PROCEDURE — 36430 TRANSFUSION BLD/BLD COMPNT: CPT

## 2025-01-18 PROCEDURE — 700105 HCHG RX REV CODE 258: Performed by: HOSPITALIST

## 2025-01-18 PROCEDURE — 99291 CRITICAL CARE FIRST HOUR: CPT | Performed by: HOSPITALIST

## 2025-01-18 PROCEDURE — P9016 RBC LEUKOCYTES REDUCED: HCPCS

## 2025-01-18 PROCEDURE — 700105 HCHG RX REV CODE 258: Performed by: STUDENT IN AN ORGANIZED HEALTH CARE EDUCATION/TRAINING PROGRAM

## 2025-01-18 PROCEDURE — 85014 HEMATOCRIT: CPT

## 2025-01-18 PROCEDURE — 700102 HCHG RX REV CODE 250 W/ 637 OVERRIDE(OP): Performed by: HOSPITALIST

## 2025-01-18 PROCEDURE — 36415 COLL VENOUS BLD VENIPUNCTURE: CPT

## 2025-01-18 PROCEDURE — 700111 HCHG RX REV CODE 636 W/ 250 OVERRIDE (IP): Performed by: HOSPITALIST

## 2025-01-18 PROCEDURE — 85018 HEMOGLOBIN: CPT | Mod: 91

## 2025-01-18 PROCEDURE — 94760 N-INVAS EAR/PLS OXIMETRY 1: CPT

## 2025-01-18 PROCEDURE — 86923 COMPATIBILITY TEST ELECTRIC: CPT

## 2025-01-18 RX ORDER — SODIUM CHLORIDE 9 MG/ML
INJECTION, SOLUTION INTRAVENOUS CONTINUOUS
Status: ACTIVE | OUTPATIENT
Start: 2025-01-18 | End: 2025-01-18

## 2025-01-18 RX ADMIN — ACETAMINOPHEN 650 MG: 325 TABLET ORAL at 21:35

## 2025-01-18 RX ADMIN — ACETAMINOPHEN 650 MG: 325 TABLET ORAL at 09:33

## 2025-01-18 RX ADMIN — PANTOPRAZOLE SODIUM 8 MG/HR: 40 INJECTION, POWDER, FOR SOLUTION INTRAVENOUS at 06:21

## 2025-01-18 RX ADMIN — ACETAMINOPHEN 650 MG: 325 TABLET ORAL at 15:36

## 2025-01-18 RX ADMIN — PANTOPRAZOLE SODIUM 8 MG/HR: 40 INJECTION, POWDER, FOR SOLUTION INTRAVENOUS at 16:43

## 2025-01-18 RX ADMIN — SODIUM CHLORIDE: 9 INJECTION, SOLUTION INTRAVENOUS at 06:27

## 2025-01-18 ASSESSMENT — ENCOUNTER SYMPTOMS
NEUROLOGICAL NEGATIVE: 1
ORTHOPNEA: 0
SPUTUM PRODUCTION: 0
RESPIRATORY NEGATIVE: 1
EYES NEGATIVE: 1
PALPITATIONS: 0
PSYCHIATRIC NEGATIVE: 1
BLOOD IN STOOL: 1
CHILLS: 0
COUGH: 0
NAUSEA: 0
FEVER: 0
MUSCULOSKELETAL NEGATIVE: 1
VOMITING: 0
CARDIOVASCULAR NEGATIVE: 1
HEMOPTYSIS: 0
DIZZINESS: 0

## 2025-01-18 ASSESSMENT — PAIN DESCRIPTION - PAIN TYPE
TYPE: ACUTE PAIN
TYPE: ACUTE PAIN;SURGICAL PAIN

## 2025-01-18 NOTE — CARE PLAN
The patient is Watcher - Medium risk of patient condition declining or worsening    Shift Goals  Clinical Goals: Monitor S/S bleeding, Trend H&H  Patient Goals: rest, results  Family Goals: comfort, results    Progress made toward(s) clinical / shift goals:    Problem: Fall Risk  Goal: Patient will remain free from falls  Description: Target End Date:  Prior to discharge or change in level of care    Document interventions on the Violeta Cat Fall Risk Assessment    1.  Assess for fall risk factors  2.  Implement fall precautions  Outcome: Progressing  Note: Patient remains free of falls. Patient uses a FWW for stability & calls for assistance      Problem: Knowledge Deficit - Standard  Goal: Patient and family/care givers will demonstrate understanding of plan of care, disease process/condition, diagnostic tests and medications  Description: Target End Date:  1-3 days or as soon as patient condition allows    Document in Patient Education    1.  Patient and family/caregiver oriented to unit, equipment, visitation policy and means for communicating concern  2.  Complete/review Learning Assessment  3.  Assess knowledge level of disease process/condition, treatment plan, diagnostic tests and medications  4.  Explain disease process/condition, treatment plan, diagnostic tests and medications  Outcome: Progressing  Note: Plan of care discussed with patient and . Patient and  understands the plan of care.       Patient is not progressing towards the following goals:      Problem: Hemodynamics  Goal: Patient's hemodynamics, fluid balance and neurologic status will be stable or improve  Description: Target End Date:  Prior to discharge or change in level of care    Document on Assessment and I/O flowsheet templates    1.  Monitor vital signs, pulse oximetry and cardiac monitor per provider order and/or policy  2.  Maintain blood pressure per provider order  3.  Hemodynamic monitoring per provider order  4.   Manage IV fluids and IV infusions  5.  Monitor intake and output  6.  Daily weights per unit policy or provider order  7.  Assess peripheral pulses and capillary refill  8.  Assess color and body temperature  9.  Position patient for maximum circulation/cardiac output  10. Monitor for signs/symptoms of excessive bleeding  11. Assess mental status, restlessness and changes in level of consciousness  12. Monitor temperature and report fever or hypothermia to provider immediately. Consideration of targeted temperature management.  Outcome: Not Progressing  Note: Patient H&H is not remaining stable. Patient has required two blood transfusions within the last 24 hours

## 2025-01-18 NOTE — PROGRESS NOTES
Telemetry summary     Rhythm: SR/ST  Rate: 70-94-- Rate up to 130 with ambulation  Ectopy:  rPVCs  Measurements: 0.16/0.06/0.34

## 2025-01-18 NOTE — PROGRESS NOTES
Gastroenterology Progress Note     Author: @[facility]@   Date & Time Created: 1/18/2025 2:02 PM    Chief Complaint:  No pain  On clears    Interval History:  Required 2  PRBCs one today    Had 2 small bowel movements with some blood    EGD revealaed large DU with hemorrhagic spots    HGB 12/9/24 was 15.8 and on 1/16/25 10.9 1/18 6.8 now 7.9    Review of Systems:  Review of Systems   Constitutional:  Negative for fever.        On zepbound   HENT: Negative.     Eyes: Negative.    Respiratory: Negative.     Cardiovascular: Negative.    Gastrointestinal:  Positive for blood in stool.   Genitourinary: Negative.    Musculoskeletal: Negative.         S/p knee replacement on meloxicam   Skin: Negative.    Neurological: Negative.    Endo/Heme/Allergies: Negative.    Psychiatric/Behavioral: Negative.         Physical Exam:  Physical Exam  Constitutional:       General: She is not in acute distress.     Appearance: She is not ill-appearing, toxic-appearing or diaphoretic.   HENT:      Head: Normocephalic and atraumatic.      Nose: Nose normal. No congestion.   Eyes:      General: No scleral icterus.  Cardiovascular:      Rate and Rhythm: Tachycardia present.   Pulmonary:      Effort: Pulmonary effort is normal. No respiratory distress.   Abdominal:      General: There is no distension.      Tenderness: There is no abdominal tenderness.   Skin:     General: Skin is warm and dry.      Coloration: Skin is not jaundiced.   Neurological:      General: No focal deficit present.      Mental Status: She is alert.   Psychiatric:         Mood and Affect: Mood normal.         Behavior: Behavior normal.         Thought Content: Thought content normal.         Judgment: Judgment normal.         Labs:          Recent Labs     01/16/25  0755   SODIUM 138   POTASSIUM 4.1   CHLORIDE 105   CO2 21   BUN 39*   CREATININE 0.82   CALCIUM 8.7     Recent Labs     01/16/25  0755   ALTSGPT 15   ASTSGOT 12   ALKPHOSPHAT 52   TBILIRUBIN 0.5   GLUCOSE 148*      Recent Labs     25  0755 25  1628 25  0023 25  1622 25  0005 25  0728   RBC 3.33* 2.69*  --   --   --   --    HEMOGLOBIN 10.9* 8.8*   < > 7.7* 6.8* 7.9*   HEMATOCRIT 32.8* 27.7*  --   --   --  24.5*   PLATELETCT 340 289  --   --   --   --    PROTHROMBTM 13.3  --   --   --   --   --    INR 0.98  --   --   --   --   --     < > = values in this interval not displayed.     Recent Labs     25   WBC 9.2 15.3*   NEUTSPOLYS 73.20* 86.30*   LYMPHOCYTES 17.20* 7.20*   MONOCYTES 6.90 4.90   EOSINOPHILS 1.50 0.50   BASOPHILS 0.50 0.30   ASTSGOT 12  --    ALTSGPT 15  --    ALKPHOSPHAT 52  --    TBILIRUBIN 0.5  --      Hemodynamics:  Temp (24hrs), Av.8 °C (98.3 °F), Min:36.7 °C (98 °F), Max:37.1 °C (98.8 °F)  Temperature: 37 °C (98.6 °F)  Pulse  Av.7  Min: 77  Max: 120   Blood Pressure : 129/74     Respiratory:    Respiration: 18, Pulse Oximetry: 96 %           Fluids:    Intake/Output Summary (Last 24 hours) at 2025 1402  Last data filed at 2025 1300  Gross per 24 hour   Intake 908.6 ml   Output --   Net 908.6 ml        GI/Nutrition:  Orders Placed This Encounter   Procedures    Diet Order Diet: Clear Liquid     Standing Status:   Standing     Number of Occurrences:   1     Order Specific Question:   Diet:     Answer:   Clear Liquid [10]     Medical Decision Making, by Problem:  Active Hospital Problems    Diagnosis     *Duodenal ulcer [K26.9]     GI bleed [K92.2]     Acute blood loss anemia [D62]     Tachycardia [R00.0]     Hyperthyroidism [E05.90]     Essential hypertension [I10]      Large DU likely 2/2 chronic meloxicam use    Plan:  Only Tylenol for pain   IV PPI  Monitor hematochezia and q 8  HGB   Keep NPO after midnite in case HGB deteriorates or there is significant GI blood loss    Quality-Core Measures

## 2025-01-18 NOTE — PROGRESS NOTES
Hospital Medicine Daily Progress Note    Date of Service  1/18/2025    Chief Complaint  Madiha Garrett is a 74 y.o. female admitted 1/16/2025 with dark bloody stools    Hospital Course  Madiha Garrett has past while she was includes osteoarthritis.  She underwent scheduled left knee arthroplasty on 1/7/2025.  The patient was discharged home and there were no complications.  The patient developed dark bloody stools and presented to the emergency room on 1/16/2025 with GI bleed.  Initially evaluation was concerning for significant drop in hemoglobin as well as tachycardia.  Patient was hospitalized, GI was consulted, the patient underwent a EGD which showed a large duodenal ulcer with active bleeding.  This was treated with epinephrine.  Overall it was felt that the risk of rebleeding was quite high.  Extended course of intravenous proton pump inhibitor infusion recommended.    Interval Problem Update  Patient received packed red blood cells and platelets yesterday  She did feel stronger after the platelet transfusion  This morning's labs show an adequate response to transfusion  Patient had 1 bowel movement this morning with a tiny amount of blood in it, she has not had any emesis, she does not feel nauseous    Patient is critically ill with life threatening blood loss anemia requiring management of blood products with inadequate prior response to transfusion to prevent cardiovascular collapse. Serial labs have and will be evaluated with blood products give based on results.  Including 1 unit of PRBC's today.  Total of 42 minutes of critical care time spent with patient, discussed on rounds with RN, pharmacy, and RT. This time is exclusive of any procedures performed and does not overlap with other physician's time.     I have discussed this patient's plan of care and discharge plan at IDT rounds today with Case Management, Nursing, Nursing leadership, and other members of the IDT  team.    Consultants/Specialty  GI    Code Status  Full Code    Disposition  Medically Cleared  I have placed the appropriate orders for post-discharge needs.    Review of Systems  Review of Systems   Constitutional:  Negative for chills and malaise/fatigue.   Respiratory:  Negative for cough, hemoptysis and sputum production.    Cardiovascular:  Negative for chest pain, palpitations and orthopnea.   Gastrointestinal:  Positive for blood in stool. Negative for nausea and vomiting.   Skin:  Negative for itching and rash.   Neurological:  Negative for dizziness.   All other systems reviewed and are negative.       Physical Exam  Temp:  [36.7 °C (98 °F)-37.1 °C (98.8 °F)] 36.8 °C (98.2 °F)  Pulse:  [84-99] 91  Resp:  [16-18] 16  BP: (113-151)/(67-86) 134/81  SpO2:  [95 %-99 %] 97 %    Physical Exam  Constitutional:       General: She is not in acute distress.     Appearance: Normal appearance. She is normal weight.   HENT:      Head: Normocephalic and atraumatic.      Right Ear: External ear normal.      Left Ear: External ear normal.      Nose: Nose normal.   Eyes:      Extraocular Movements: Extraocular movements intact.   Cardiovascular:      Rate and Rhythm: Regular rhythm. Tachycardia present.      Pulses: Normal pulses.   Pulmonary:      Effort: Pulmonary effort is normal.      Breath sounds: Normal breath sounds.   Abdominal:      General: Abdomen is flat. Bowel sounds are normal.      Palpations: Abdomen is soft.   Musculoskeletal:         General: Normal range of motion.      Cervical back: Normal range of motion and neck supple.      Comments: Left knee dressing/incision clean dry and intact   Skin:     General: Skin is warm and dry.   Neurological:      General: No focal deficit present.      Mental Status: She is alert and oriented to person, place, and time.      Cranial Nerves: No cranial nerve deficit.   Psychiatric:         Mood and Affect: Mood normal.         Behavior: Behavior normal.          Fluids    Intake/Output Summary (Last 24 hours) at 1/18/2025 1102  Last data filed at 1/18/2025 0905  Gross per 24 hour   Intake 1582.35 ml   Output --   Net 1582.35 ml        Laboratory  Recent Labs     01/16/25  0755 01/16/25  1628 01/17/25  0023 01/17/25  1622 01/18/25  0005 01/18/25  0728   WBC 9.2 15.3*  --   --   --   --    RBC 3.33* 2.69*  --   --   --   --    HEMOGLOBIN 10.9* 8.8*   < > 7.7* 6.8* 7.9*   HEMATOCRIT 32.8* 27.7*  --   --   --  24.5*   MCV 98.5* 103.0*  --   --   --   --    MCH 32.7 32.7  --   --   --   --    MCHC 33.2 31.8*  --   --   --   --    RDW 48.2 50.9*  --   --   --   --    PLATELETCT 340 289  --   --   --   --    MPV 9.7 9.8  --   --   --   --     < > = values in this interval not displayed.     Recent Labs     01/16/25  0755   SODIUM 138   POTASSIUM 4.1   CHLORIDE 105   CO2 21   GLUCOSE 148*   BUN 39*   CREATININE 0.82   CALCIUM 8.7     Recent Labs     01/16/25  0755   INR 0.98               Imaging  No orders to display        Assessment/Plan  * Duodenal ulcer- (present on admission)  Assessment & Plan  1/18:  Patient has a large duodenal ulcer with active bleeding, treated with epinephrine on 1/16/2025, high risk of rebleeding  Continue hospital care and intravenous PPI for 72 hours  Liquid diet  Monitor hemoglobin every 8 hours    Acute blood loss anemia- (present on admission)  Assessment & Plan  1/18:  Transfused 1 unit packed red blood cells and 1 unit platelets on 1/17/2025  Patient has inadequate response to transfusion  Transfuse another unit of packed red blood cells today  I ordered serial labs to follow-up    Tachycardia- (present on admission)  Assessment & Plan  Tachycardia improved  Monitor on telemetry    GI bleed- (present on admission)  Assessment & Plan  1/18:  As above    Essential hypertension- (present on admission)  Assessment & Plan  Hold outpatient antihypertensives due to risk of hypotension    Hyperthyroidism- (present on admission)  Assessment &  Plan  Methimazole         VTE prophylaxis:   SCDs/TEDs      I have performed a physical exam and reviewed and updated ROS and Plan today (1/18/2025). In review of yesterday's note (1/17/2025), there are no changes except as documented above.

## 2025-01-18 NOTE — PROGRESS NOTES
Patients IV located in left arm infiltrated. IV was removed, patient requested to not have a new IV placed immediately. Patient advised a second IV will be needed, since she is receiving a blood transfusion and she has additional medications ordered. Patient agrees to have a new IV place, but requested to wait for an hour or so due to having some pain in her left arm.

## 2025-01-18 NOTE — PROGRESS NOTES
Patients midnight H&H draw resulted with her hemoglobin being 6.8. Dr Bond notified. 1 unit of PRBC transfused.

## 2025-01-18 NOTE — CARE PLAN
Problem: Psychosocial  Goal: Patient's ability to verbalize feelings about condition will improve  Outcome: Progressing     Problem: Hemodynamics  Goal: Patient's hemodynamics, fluid balance and neurologic status will be stable or improve  Outcome: Progressing   The patient is Watcher - Medium risk of patient condition declining or worsening    Shift Goals  Clinical Goals: Monitor S/S bleeding, Trend H&H  Patient Goals: rest, results  Family Goals: comfort, results    Progress made toward(s) clinical / shift goals:  trending hemoglobins. Pt has had two small bloody bowel movements this shift. Pt reports overall feeling better, stronger, and vital signs have been stable. Pt denies any abomdinal pain.   Pt also had recent knee surgery so she has been working hard to rehab it. Goal made with pt to ambulate, pt successfully ambulated the entire unit with the FWW.    Patient is not progressing towards the following goals:

## 2025-01-18 NOTE — PROGRESS NOTES
Telemetry Shift Summary     Per monitor tech:  Rhythm SR-ST  HR Range   Ectopy R-PVC/PAC  Measurements 0.20/0.08/0.40      Normal Values  Rhythm SR  HR Range:   Measurements: 0.12-0.20/0.06-0.10/0.30-0.52

## 2025-01-19 ENCOUNTER — APPOINTMENT (OUTPATIENT)
Dept: RADIOLOGY | Facility: MEDICAL CENTER | Age: 75
DRG: 378 | End: 2025-01-19
Attending: HOSPITALIST
Payer: MEDICARE

## 2025-01-19 PROBLEM — M79.89 LEFT UPPER EXTREMITY SWELLING: Status: ACTIVE | Noted: 2025-01-19

## 2025-01-19 LAB
ANION GAP SERPL CALC-SCNC: 8 MMOL/L (ref 7–16)
BUN SERPL-MCNC: 15 MG/DL (ref 8–22)
CALCIUM SERPL-MCNC: 8 MG/DL (ref 8.4–10.2)
CHLORIDE SERPL-SCNC: 108 MMOL/L (ref 96–112)
CO2 SERPL-SCNC: 23 MMOL/L (ref 20–33)
CREAT SERPL-MCNC: 0.64 MG/DL (ref 0.5–1.4)
ERYTHROCYTE [DISTWIDTH] IN BLOOD BY AUTOMATED COUNT: 56.9 FL (ref 35.9–50)
GFR SERPLBLD CREATININE-BSD FMLA CKD-EPI: 93 ML/MIN/1.73 M 2
GLUCOSE SERPL-MCNC: 95 MG/DL (ref 65–99)
HCT VFR BLD AUTO: 23.2 % (ref 37–47)
HCT VFR BLD AUTO: 26.5 % (ref 37–47)
HCT VFR BLD AUTO: 28.5 % (ref 37–47)
HGB BLD-MCNC: 7.5 G/DL (ref 12–16)
HGB BLD-MCNC: 8.4 G/DL (ref 12–16)
HGB BLD-MCNC: 9.2 G/DL (ref 12–16)
MCH RBC QN AUTO: 31 PG (ref 27–33)
MCHC RBC AUTO-ENTMCNC: 32.3 G/DL (ref 32.2–35.5)
MCV RBC AUTO: 95.9 FL (ref 81.4–97.8)
PLATELET # BLD AUTO: 233 K/UL (ref 164–446)
PMV BLD AUTO: 9.8 FL (ref 9–12.9)
POTASSIUM SERPL-SCNC: 3.8 MMOL/L (ref 3.6–5.5)
RBC # BLD AUTO: 2.42 M/UL (ref 4.2–5.4)
SODIUM SERPL-SCNC: 139 MMOL/L (ref 135–145)
WBC # BLD AUTO: 7.6 K/UL (ref 4.8–10.8)

## 2025-01-19 PROCEDURE — 99232 SBSQ HOSP IP/OBS MODERATE 35: CPT | Performed by: HOSPITALIST

## 2025-01-19 PROCEDURE — 85014 HEMATOCRIT: CPT | Mod: 91

## 2025-01-19 PROCEDURE — 85018 HEMOGLOBIN: CPT

## 2025-01-19 PROCEDURE — 80048 BASIC METABOLIC PNL TOTAL CA: CPT

## 2025-01-19 PROCEDURE — 94760 N-INVAS EAR/PLS OXIMETRY 1: CPT

## 2025-01-19 PROCEDURE — 93971 EXTREMITY STUDY: CPT

## 2025-01-19 PROCEDURE — 36415 COLL VENOUS BLD VENIPUNCTURE: CPT

## 2025-01-19 PROCEDURE — A9270 NON-COVERED ITEM OR SERVICE: HCPCS | Performed by: HOSPITALIST

## 2025-01-19 PROCEDURE — 700111 HCHG RX REV CODE 636 W/ 250 OVERRIDE (IP): Performed by: HOSPITALIST

## 2025-01-19 PROCEDURE — 700105 HCHG RX REV CODE 258: Performed by: HOSPITALIST

## 2025-01-19 PROCEDURE — 770020 HCHG ROOM/CARE - TELE (206)

## 2025-01-19 PROCEDURE — 700102 HCHG RX REV CODE 250 W/ 637 OVERRIDE(OP): Performed by: HOSPITALIST

## 2025-01-19 PROCEDURE — 85027 COMPLETE CBC AUTOMATED: CPT

## 2025-01-19 RX ORDER — PANTOPRAZOLE SODIUM 40 MG/10ML
40 INJECTION, POWDER, LYOPHILIZED, FOR SOLUTION INTRAVENOUS 2 TIMES DAILY
Status: COMPLETED | OUTPATIENT
Start: 2025-01-19 | End: 2025-01-19

## 2025-01-19 RX ORDER — PANTOPRAZOLE SODIUM 40 MG/10ML
40 INJECTION, POWDER, LYOPHILIZED, FOR SOLUTION INTRAVENOUS 2 TIMES DAILY
Status: DISCONTINUED | OUTPATIENT
Start: 2025-01-19 | End: 2025-01-19

## 2025-01-19 RX ORDER — CARBOXYMETHYLCELLULOSE SODIUM 5 MG/ML
1 SOLUTION/ DROPS OPHTHALMIC PRN
Status: DISCONTINUED | OUTPATIENT
Start: 2025-01-19 | End: 2025-01-20 | Stop reason: HOSPADM

## 2025-01-19 RX ORDER — LOSARTAN POTASSIUM 50 MG/1
100 TABLET ORAL DAILY
Status: DISCONTINUED | OUTPATIENT
Start: 2025-01-19 | End: 2025-01-20 | Stop reason: HOSPADM

## 2025-01-19 RX ADMIN — PANTOPRAZOLE SODIUM 8 MG/HR: 40 INJECTION, POWDER, FOR SOLUTION INTRAVENOUS at 01:49

## 2025-01-19 RX ADMIN — LOSARTAN POTASSIUM 100 MG: 50 TABLET, FILM COATED ORAL at 13:49

## 2025-01-19 RX ADMIN — ACETAMINOPHEN 650 MG: 325 TABLET ORAL at 23:57

## 2025-01-19 RX ADMIN — PANTOPRAZOLE SODIUM 40 MG: 40 INJECTION, POWDER, FOR SOLUTION INTRAVENOUS at 10:32

## 2025-01-19 RX ADMIN — PANTOPRAZOLE SODIUM 40 MG: 40 INJECTION, POWDER, FOR SOLUTION INTRAVENOUS at 17:31

## 2025-01-19 RX ADMIN — ACETAMINOPHEN 650 MG: 325 TABLET ORAL at 12:12

## 2025-01-19 RX ADMIN — CARBOXYMETHYLCELLULOSE SODIUM 1 DROP: 5 SOLUTION/ DROPS OPHTHALMIC at 12:12

## 2025-01-19 ASSESSMENT — ENCOUNTER SYMPTOMS
ORTHOPNEA: 0
CARDIOVASCULAR NEGATIVE: 1
NEUROLOGICAL NEGATIVE: 1
RESPIRATORY NEGATIVE: 1
DIZZINESS: 0
COUGH: 0
MUSCULOSKELETAL NEGATIVE: 1
EYES NEGATIVE: 1
FEVER: 0
BLOOD IN STOOL: 1
HEMOPTYSIS: 0
SPUTUM PRODUCTION: 0
PALPITATIONS: 0
NAUSEA: 0
VOMITING: 0
PSYCHIATRIC NEGATIVE: 1
CHILLS: 0

## 2025-01-19 ASSESSMENT — PAIN DESCRIPTION - PAIN TYPE
TYPE: SURGICAL PAIN
TYPE: SURGICAL PAIN
TYPE: ACUTE PAIN

## 2025-01-19 NOTE — PROGRESS NOTES
12-hour chart check complete.    Monitor Summary  Rhythm: SR  Rate: 88  Ectopy: rpvc  Measurements: 0.20/0.06/0.36

## 2025-01-19 NOTE — CARE PLAN
The patient is Stable - Low risk of patient condition declining or worsening    Shift Goals  Clinical Goals: Monitor for s/s of bleeding  Patient Goals: Eat, updates from doctors  Family Goals: Updates    Progress made toward(s) clinical / shift goals:    Problem: Knowledge Deficit - Standard  Goal: Patient and family/care givers will demonstrate understanding of plan of care, disease process/condition, diagnostic tests and medications  Outcome: Progressing  Note: Reviewed POC; discussed and provided education on medications, IV fluids, weaning oxygen, restarting BP medication, monitoring for s/s of bleeding, labs(Especially H&H), Ultrasound, advancing diet, and pain management.      Problem: Respiratory  Goal: Patient will achieve/maintain optimum respiratory ventilation and gas exchange  Outcome: Progressing  Note: Patient weaned to baseline room air.      Problem: Pain - Standard  Goal: Alleviation of pain or a reduction in pain to the patient’s comfort goal  Outcome: Progressing  Note: Discussed and provided education on pharmacological and non-pharmacological pain management interventions. Patient reporting decreased pain after pharmacological intervention paired with cold packs on knee. Education provided on MD instruction to have Tylenol only for pain management at this time.        Patient is not progressing towards the following goals:

## 2025-01-19 NOTE — PROGRESS NOTES
Gastroenterology Progress Note     Author: @[facility]@   Date & Time Created: 1/19/2025 9:01 AM    Chief Complaint:  No pain  On clears      Interval History:  Standing in bathroom, no dizzyness    No bowel movement 24 hours no hematochezia or melena    EGD revealed large DU with hemorrhagic spots    HGB 12/9/24 was 15.8 and on 1/16/25 10.9 1/18 6.8 -  7.9 today 1/19 7.5    Review of Systems:  Review of Systems   Constitutional:  Negative for fever.        On zepbound   HENT: Negative.     Eyes: Negative.    Respiratory: Negative.     Cardiovascular: Negative.    Gastrointestinal:  Positive for blood in stool.   Genitourinary: Negative.    Musculoskeletal: Negative.         S/p knee replacement on meloxicam   Skin: Negative.    Neurological: Negative.    Endo/Heme/Allergies: Negative.    Psychiatric/Behavioral: Negative.         Physical Exam:  Physical Exam  Constitutional:       General: She is not in acute distress.     Appearance: She is not ill-appearing, toxic-appearing or diaphoretic.   HENT:      Head: Normocephalic and atraumatic.      Nose: Nose normal. No congestion.   Eyes:      General: No scleral icterus.  Cardiovascular:      Rate and Rhythm: Tachycardia present.   Pulmonary:      Effort: Pulmonary effort is normal. No respiratory distress.   Abdominal:      General: There is no distension.      Tenderness: There is no abdominal tenderness.   Skin:     General: Skin is warm and dry.      Coloration: Skin is not jaundiced.   Neurological:      General: No focal deficit present.      Mental Status: She is alert.   Psychiatric:         Mood and Affect: Mood normal.         Behavior: Behavior normal.         Thought Content: Thought content normal.         Judgment: Judgment normal.         Labs:          Recent Labs     01/19/25  0039   SODIUM 139   POTASSIUM 3.8   CHLORIDE 108   CO2 23   BUN 15   CREATININE 0.64   CALCIUM 8.0*     Recent Labs     01/19/25  0039   GLUCOSE 95     Recent Labs      25  1628 25  0023 25  0728 25  1535 25  0039   RBC 2.69*  --   --   --  2.42*   HEMOGLOBIN 8.8*   < > 7.9* 8.3* 7.5*   HEMATOCRIT 27.7*  --  24.5* 25.4* 23.2*   PLATELETCT 289  --   --   --  233    < > = values in this interval not displayed.     Recent Labs     25  1628 25  0039   WBC 15.3* 7.6   NEUTSPOLYS 86.30*  --    LYMPHOCYTES 7.20*  --    MONOCYTES 4.90  --    EOSINOPHILS 0.50  --    BASOPHILS 0.30  --      Hemodynamics:  Temp (24hrs), Av.9 °C (98.5 °F), Min:36.7 °C (98.1 °F), Max:37.5 °C (99.5 °F)  Temperature: 37.5 °C (99.5 °F)  Pulse  Av.5  Min: 77  Max: 120   Blood Pressure : (!) 152/86 (Rn notified )     Respiratory:    Respiration: 18, Pulse Oximetry: 97 %           Fluids:    Intake/Output Summary (Last 24 hours) at 2025 1402  Last data filed at 2025 1300  Gross per 24 hour   Intake 908.6 ml   Output --   Net 908.6 ml        GI/Nutrition:  Orders Placed This Encounter   Procedures    Diet Order Diet: Regular     Standing Status:   Standing     Number of Occurrences:   1     Order Specific Question:   Diet:     Answer:   Regular [1]     Medical Decision Making, by Problem:  Active Hospital Problems    Diagnosis     *Duodenal ulcer [K26.9]     GI bleed [K92.2]     Acute blood loss anemia [D62]     Tachycardia [R00.0]     Hyperthyroidism [E05.90]     Essential hypertension [I10]      Large DU likely 2/2 chronic meloxicam use    Plan:  Only Tylenol for pain   IV PPI  Start regular diet    Will schedule for outpatient follow-up with [facility] in 2 months    Quality-Core Measures

## 2025-01-19 NOTE — CARE PLAN
The patient is Stable - Low risk of patient condition declining or worsening    Shift Goals  Clinical Goals: mointor labs and vitals  Patient Goals: rest  Family Goals: comfort, results    Progress made toward(s) clinical / shift goals:    Problem: Fall Risk  Goal: Patient will remain free from falls  Outcome: Progressing     Problem: Knowledge Deficit - Standard  Goal: Patient and family/care givers will demonstrate understanding of plan of care, disease process/condition, diagnostic tests and medications  Outcome: Progressing       Patient is not progressing towards the following goals:

## 2025-01-19 NOTE — PROGRESS NOTES
Hospital Medicine Daily Progress Note    Date of Service  1/19/2025    Chief Complaint  Madiha Garrett is a 74 y.o. female admitted 1/16/2025 with dark bloody stools    Hospital Course  Madiha Garrett has past while she was includes osteoarthritis.  She underwent scheduled left knee arthroplasty on 1/7/2025.  The patient was discharged home and there were no complications.  The patient developed dark bloody stools and presented to the emergency room on 1/16/2025 with GI bleed.  Initially evaluation was concerning for significant drop in hemoglobin as well as tachycardia.  Patient was hospitalized, GI was consulted, the patient underwent a EGD which showed a large duodenal ulcer with active bleeding.  This was treated with epinephrine.  Overall it was felt that the risk of rebleeding was quite high.  Extended course of intravenous proton pump inhibitor infusion recommended.    Interval Problem Update  Patient seen and examined, her  at the bedside  I also discussed case with gastroenterology  She is feeling a little bit stronger today, she has ambulated and is not feeling dizzy or lightheaded  No significant bloody bowel movement since yesterday  Denies nausea, no emesis    I have discussed this patient's plan of care and discharge plan at IDT rounds today with Case Management, Nursing, Nursing leadership, and other members of the IDT team.    Consultants/Specialty  GI    Code Status  Full Code    Disposition  The patient is not medically cleared for discharge to home or a post-acute facility.  Anticipate discharge to: home with close outpatient follow-up    I have placed the appropriate orders for post-discharge needs.    Review of Systems  Review of Systems   Constitutional:  Negative for chills and malaise/fatigue.   Respiratory:  Negative for cough, hemoptysis and sputum production.    Cardiovascular:  Negative for chest pain, palpitations and orthopnea.   Gastrointestinal:  Negative for nausea and  vomiting.   Neurological:  Negative for dizziness.        Physical Exam  Temp:  [36.7 °C (98.1 °F)-37.5 °C (99.5 °F)] 37.5 °C (99.5 °F)  Pulse:  [81-93] 90  Resp:  [18] 18  BP: (116-152)/(64-86) 152/86  SpO2:  [95 %-98 %] 97 %    Physical Exam  Constitutional:       General: She is not in acute distress.     Appearance: Normal appearance. She is normal weight.   HENT:      Head: Normocephalic and atraumatic.      Right Ear: External ear normal.      Left Ear: External ear normal.      Nose: Nose normal.   Eyes:      Extraocular Movements: Extraocular movements intact.   Cardiovascular:      Rate and Rhythm: Regular rhythm. Tachycardia present.      Pulses: Normal pulses.   Pulmonary:      Effort: Pulmonary effort is normal.      Breath sounds: Normal breath sounds.   Abdominal:      General: Abdomen is flat. Bowel sounds are normal.      Palpations: Abdomen is soft.   Musculoskeletal:         General: Normal range of motion.      Cervical back: Normal range of motion and neck supple.      Comments: Left knee dressing/incision clean dry and intact   Skin:     General: Skin is warm and dry.   Neurological:      General: No focal deficit present.      Mental Status: She is alert and oriented to person, place, and time.      Cranial Nerves: No cranial nerve deficit.   Psychiatric:         Mood and Affect: Mood normal.         Behavior: Behavior normal.         Fluids    Intake/Output Summary (Last 24 hours) at 1/19/2025 1021  Last data filed at 1/18/2025 2300  Gross per 24 hour   Intake 840 ml   Output --   Net 840 ml        Laboratory  Recent Labs     01/16/25  1628 01/17/25  0023 01/18/25  1535 01/19/25  0039 01/19/25  0853   WBC 15.3*  --   --  7.6  --    RBC 2.69*  --   --  2.42*  --    HEMOGLOBIN 8.8*   < > 8.3* 7.5* 8.4*   HEMATOCRIT 27.7*   < > 25.4* 23.2* 26.5*   .0*  --   --  95.9  --    MCH 32.7  --   --  31.0  --    MCHC 31.8*  --   --  32.3  --    RDW 50.9*  --   --  56.9*  --    PLATELETCT 289  --    --  233  --    MPV 9.8  --   --  9.8  --     < > = values in this interval not displayed.     Recent Labs     01/19/25  0039   SODIUM 139   POTASSIUM 3.8   CHLORIDE 108   CO2 23   GLUCOSE 95   BUN 15   CREATININE 0.64   CALCIUM 8.0*                     Imaging  US-EXTREMITY VENOUS UPPER UNILAT    (Results Pending)        Assessment/Plan  * Duodenal ulcer- (present on admission)  Assessment & Plan  1/19:  PPI    Acute blood loss anemia- (present on admission)  Assessment & Plan  1/19:  Blood loss appears to have finally slowed  Hemoglobin stable, has been 24 hours since last abdominal with blood  I discussed with gastroenterology  Advance diet  Change PPI to IV pushes, transition to p.o. PPI tomorrow  Avoid NSAIDs    Left upper extremity swelling  Assessment & Plan  1/19:  I ordered an ultrasound of left upper extremity to rule out thrombus    Tachycardia- (present on admission)  Assessment & Plan  Tachycardia improved  Monitor on telemetry    GI bleed- (present on admission)  Assessment & Plan  As above    Essential hypertension- (present on admission)  Assessment & Plan  Hold outpatient antihypertensives due to risk of hypotension    Hyperthyroidism- (present on admission)  Assessment & Plan  Methimazole         VTE prophylaxis:   SCDs/TEDs      I have performed a physical exam and reviewed and updated ROS and Plan today (1/19/2025). In review of yesterday's note (1/18/2025), there are no changes except as documented above.

## 2025-01-20 VITALS
OXYGEN SATURATION: 95 % | HEIGHT: 66 IN | DIASTOLIC BLOOD PRESSURE: 76 MMHG | WEIGHT: 228.62 LBS | SYSTOLIC BLOOD PRESSURE: 131 MMHG | HEART RATE: 86 BPM | RESPIRATION RATE: 18 BRPM | TEMPERATURE: 98.3 F | BODY MASS INDEX: 36.74 KG/M2

## 2025-01-20 PROBLEM — K92.2 GI BLEED: Status: RESOLVED | Noted: 2025-01-16 | Resolved: 2025-01-20

## 2025-01-20 PROBLEM — R00.0 TACHYCARDIA: Status: RESOLVED | Noted: 2025-01-16 | Resolved: 2025-01-20

## 2025-01-20 LAB
HCT VFR BLD AUTO: 23.9 % (ref 37–47)
HCT VFR BLD AUTO: 26.7 % (ref 37–47)
HGB BLD-MCNC: 7.7 G/DL (ref 12–16)
HGB BLD-MCNC: 8.9 G/DL (ref 12–16)

## 2025-01-20 PROCEDURE — A9270 NON-COVERED ITEM OR SERVICE: HCPCS | Performed by: HOSPITALIST

## 2025-01-20 PROCEDURE — 36415 COLL VENOUS BLD VENIPUNCTURE: CPT

## 2025-01-20 PROCEDURE — 99239 HOSP IP/OBS DSCHRG MGMT >30: CPT | Performed by: HOSPITALIST

## 2025-01-20 PROCEDURE — 85018 HEMOGLOBIN: CPT | Mod: 91

## 2025-01-20 PROCEDURE — 700102 HCHG RX REV CODE 250 W/ 637 OVERRIDE(OP): Performed by: HOSPITALIST

## 2025-01-20 PROCEDURE — 85014 HEMATOCRIT: CPT

## 2025-01-20 RX ADMIN — OMEPRAZOLE 20 MG: 20 CAPSULE, DELAYED RELEASE ORAL at 05:43

## 2025-01-20 RX ADMIN — METHIMAZOLE 5 MG: 5 TABLET ORAL at 05:43

## 2025-01-20 RX ADMIN — LOSARTAN POTASSIUM 100 MG: 50 TABLET, FILM COATED ORAL at 05:43

## 2025-01-20 RX ADMIN — CARBOXYMETHYLCELLULOSE SODIUM 1 DROP: 5 SOLUTION/ DROPS OPHTHALMIC at 06:02

## 2025-01-20 ASSESSMENT — FIBROSIS 4 INDEX: FIB4 SCORE: 0.97

## 2025-01-20 ASSESSMENT — PAIN DESCRIPTION - PAIN TYPE: TYPE: ACUTE PAIN

## 2025-01-20 NOTE — PROGRESS NOTES
Telemetry Shift Summary    Rhythm : SR  HR Range 88-99  Ectopy : orfPVC, rPAC  Measurements 0.18/0.06/0.36    Normal Values  Rhythm SR  HR Range:   Measurements: 0.12-0.20/0.06-0.10/0.30-0.52

## 2025-01-20 NOTE — PROGRESS NOTES
Received report from Kenya AGUILA.  Bed is locked and in lowest position. Fall and Safety precautions in place. Reviewed POC, patient is awake, sitting up in bed eating breakfast with spouse at bedside. Call light within reach. Patient verbalizes No other needs at this time.

## 2025-01-20 NOTE — CARE PLAN
The patient is Stable - Low risk of patient condition declining or worsening    Shift Goals  Clinical Goals: monitor labs and vitals  Patient Goals: rest and comfort  Family Goals: Updates    Progress made toward(s) clinical / shift goals:    Problem: Fall Risk  Goal: Patient will remain free from falls  Outcome: Progressing     Problem: Knowledge Deficit - Standard  Goal: Patient and family/care givers will demonstrate understanding of plan of care, disease process/condition, diagnostic tests and medications  Outcome: Progressing       Patient is not progressing towards the following goals:

## 2025-01-20 NOTE — PROGRESS NOTES
Late Entry    1130 Patient discharged home. IV removed and tele box removed and returned to monitor tech. Patient states all questions and concerns have been addressed appropriately. Verbalized understanding of discharge instructions and to follow up with PCP and GI New prescriptions sent to [facility] .     Patient off the unit with spouse.

## 2025-01-20 NOTE — DISCHARGE SUMMARY
Discharge Summary    CHIEF COMPLAINT ON ADMISSION  Chief Complaint   Patient presents with    Bloody Stools     X 24 hrs       Reason for Admission  Dark Stools     Admission Date  1/16/2025    CODE STATUS  Full Code    HPI & HOSPITAL COURSE  This is a 74 y.o. female here with blood stools     Madiha Garrett has past while she was includes osteoarthritis.  She underwent scheduled left knee arthroplasty on 1/7/2025.  The patient was discharged home and there were no complications.  The patient developed dark bloody stools and presented to the emergency room on 1/16/2025 with GI bleed.  Initially evaluation was concerning for significant drop in hemoglobin as well as tachycardia.  Patient was hospitalized, GI was consulted, the patient underwent a EGD which showed a large duodenal ulcer with active bleeding.  This was treated with epinephrine.  Overall it was felt that the risk of rebleeding was quite high.  Extended course of intravenous proton pump inhibitor infusion recommended.    The patient did continue to have bloody bowel movements she was treated with 2 blood transfusions and 1 platelet transfusion.    As of today her hemoglobin is stabilized and she is no longer having any significant bleeding.  She is transitioned to a p.o. PPI.  The patient can be discharged home.  She is to avoid antiplatelet agents such as aspirin.  The patient should also avoid NSAIDs such as ibuprofen and naproxen as well as Victor 2 inhibitors such as Mobic    Patient did develop a superficial venous clot in her left arm associate with an IV.  The IV has been removed and swelling has improved with elevation and warm compress.  She will continue warm compress and elevation.  Follow-up with primary care physician if she does not have complete resolution of the symptoms    Therefore, she is discharged in good and stable condition to home with close outpatient follow-up.    The patient met 2-midnight criteria for an inpatient stay at the time  of discharge.    Discharge Date  1/20/2025    FOLLOW UP ITEMS POST DISCHARGE  Follow-up with primary care, follow-up with [facility]    DISCHARGE DIAGNOSES  Principal Problem:    Duodenal ulcer (POA: Yes)  Active Problems:    Acute blood loss anemia (POA: Yes)    Essential hypertension (POA: Yes)      Overview: -Dx in 2017      -continue losartan 100mg       -denies headaches or dizzines      -Bp at home generally 120/70          Left upper extremity swelling (POA: No)    Hyperthyroidism (POA: Yes)  Resolved Problems:    GI bleed (POA: Yes)    Tachycardia (POA: Yes)      FOLLOW UP  Future Appointments   Date Time Provider Department Center   1/22/2025  1:15 PM Elver Bardales P.A.-C. [facility] [place]   1/27/2025 10:00 AM Liana Grimm, PTA [facility] [place]   1/29/2025  9:00 AM Randy Alonso, PT [facility] [place]   2/14/2025 10:40 AM Samir Amos D.O. [facility]     Samir Amos D.GABY  [address] [address] [id number] [phone]    Follow up      Rosalie Romero M.D.  [address] [place] [address]   [facility] [id number] [phone]    Follow up        MEDICATIONS ON DISCHARGE     Medication List        START taking these medications        Instructions   omeprazole 20 MG delayed-release capsule  Commonly known as: PriLOSEC   Take 1 Capsule by mouth 2 times a day.  Dose: 20 mg            CONTINUE taking these medications        Instructions   acetaminophen 500 MG Tabs  Commonly known as: Tylenol   Take 1,000 mg by mouth 4 times a day.  Dose: 1,000 mg     ascorbic acid 500 MG Tabs  Commonly known as: Ascorbic Acid   Take 500 mg by mouth every day.  Dose: 500 mg     losartan 100 MG Tabs  Commonly known as: Cozaar   TAKE ONE TABLET BY MOUTH EVERY DAY     methimazole 5 MG Tabs  Commonly known as: Tapazole   TAKE ONE-HALF TABLET BY MOUTH EVERY DAY 6 DAYS PER WEEK     vitamin D 1000 Unit (25 mcg) Tabs  Commonly known as: cholecalciferol   Take 1,000 Units by mouth every day.  Dose: 1,000  Units            STOP taking these medications      aspirin 81 MG Chew chewable tablet  Commonly known as: Asa     meloxicam 7.5 MG Tabs  Commonly known as: Mobic              Allergies  Allergies   Allergen Reactions    Meloxicam Nausea    Tramadol Nausea    Oxycodone Vomiting and Nausea       DIET  Orders Placed This Encounter   Procedures    Diet Order Diet: Regular     Standing Status:   Standing     Number of Occurrences:   1     Order Specific Question:   Diet:     Answer:   Regular [1]       ACTIVITY  As tolerated.  Weight bearing as tolerated    CONSULTATIONS  Gastroenterology    PROCEDURES  EGD 1/20/2025:  74-year-old female presenting with melena and acute blood loss anemia.     Description of procedure:  After discussion of indications,risks and benefits including the risks of perforation and bleeding informed consent was signed by the patient. Sedation was administered and documented by anesthesia. Time out was performed.  Patient was placed in the left lateral position and Olympus Video gastroscope was placed in the oral cavity and the esophagus was intubated under direct visualization. The endoscope was then advanced through the lumen of the esophagus into the stomach and passed through to the third portion of duodenum. Upon careful withdrawal of the scope, mucosa  was visualized carefully. Gastric cardia and fundus visualized by retroflexion. Findings and maneuvers as listed below. After deflating the stomach the endoscope was removed and procedure was terminated. Patient tolerated procedure well.     Findings:  Hypopharynx normal  Esophagus: Medium sized hiatal hernia otherwise normal  Stomach: Small amount of coffee-ground material present in the body of the stomach.  Otherwise entire stomach normal without any mucosal abnormalities.  Retroflexed visualization of cardia soft junction normal.  Duodenum: There is a large greater than 20 mm circumferential ulcer present at the superior duodenal  flexure.  Active oozing noted from multiple spots from the ulcer bed.  Bleeding was controlled by injecting 8 cc of 1 in 10,000 epinephrine and aliquots of 2 cc each at 4 sites.  Adherent blood clots fair dislodged by water irrigation and ulcer bed was examined closely.  There were about 3 pigmented protruding spots in the ulcer bed which were cauterized using bipolar gold probe.  Due to location of ulcer, the posterior aspect of the ulcer bed visualization was limited.  However no bleeding was observed from this area during and at the end of the procedure.  Multiple smaller ulcers with clean base present in the second portion.     Plan:  Monitor closely for rebleeding.  In case of significant bleeding, consult interventional radiology for angiogram and embolization of GDA.   Due to increased risk of rebleeding, recommend pantoprazole IV infusion for 72 hours.  Duration could be adjusted based on clinical course.  Keep n.p.o. except for ice chips.  If clinically stable, start p.o. in a.m.  Check stool for H. pylori antigen and treat if positive.       LABORATORY  Lab Results   Component Value Date    SODIUM 139 01/19/2025    POTASSIUM 3.8 01/19/2025    CHLORIDE 108 01/19/2025    CO2 23 01/19/2025    GLUCOSE 95 01/19/2025    BUN 15 01/19/2025    CREATININE 0.64 01/19/2025        Lab Results   Component Value Date    WBC 7.6 01/19/2025    HEMOGLOBIN 8.9 (L) 01/20/2025    HEMATOCRIT 26.7 (L) 01/20/2025    PLATELETCT 233 01/19/2025        Total time of the discharge process exceeds 46 minutes.

## 2025-01-20 NOTE — DISCHARGE INSTRUCTIONS
Per MD Spangler:     - Avoid use of NSAIDS (Ibuprofen, Naproxen, ect)  and LAMB 2 Inhibitors such as Mobic.  - Continue to elevate and apply warm compress to left arm for superficial venous clot.

## 2025-01-20 NOTE — PROGRESS NOTES
Telemetry Shift Summary     Rhythm: SR  HR: 81-96  Ectopy: rofPVC/rPAC    Measurements: .20/.08/.36

## 2025-01-21 ENCOUNTER — PATIENT OUTREACH (OUTPATIENT)
Dept: MEDICAL GROUP | Facility: LAB | Age: 75
End: 2025-01-21
Payer: MEDICARE

## 2025-01-21 NOTE — PROGRESS NOTES
Transitional Care Management  TCM Outreach Date and Time: Filed (1/21/2025 12:31 PM)    Discharge Questions  Actual Discharge Date: 01/20/25  Now that you are home, how are you feeling?: Good (Denies abd. pain or any bloody stools.  Is able to eat and drink normally.  No issues with right TKR; doing exercises and no pain issues.)  Did you receive any new prescriptions?: Yes  Were you able to get them filled?: Yes  Meds to Bed or Pharmacy filled?: Pharmacy  Do you have any questions about your current medications or new medications (Review Med Rec)?: No (Completed Med Rec; understands medication schedule; no longer using NSAID products)  Did you have any durable medical equipment ordered?: No (Has a walker at home from previous TKR sugery)  Do you have a follow up appointment scheduled with your PCP?: Yes  Appointment Date: 02/03/25  Appointment Time: 1320  Any issues or paperwork you wish to discuss with your PCP?: No  Are you (patient) able to get to the appointment?: Yes (Spouse)  If Home Health was ordered, have they contacted you (Patient): Not Applicable  Did you have enough support after your last discharge?: Yes (Spouse)  Does this patient qualify for the CCM program?: No    Transitional Care  Number of attempts made to contact patient: 1  Current or previous attempts completed within two business days of discharge? : Yes  Provided education regarding treatment plan, medications, self-management, ADLs?: Yes (Discussed S/S of return to ER; reviwed status of TKR)  Has patient completed an Advanced Directive?: Yes  Is the patient's advanced directive on file?: Yes  Has the Care Manager's phone number provided?: No  Is there anything else I can help you with?: No (Pt states doing well and has no further questions, issues or concerns.)    Discharge Summary  Chief Complaint: Bloody stools X24*  Admitting Diagnosis: Bloody stools; S/P TKR 1/7/25;  HX:  Osteoarthritis; HTN  Discharge Diagnosis: Duodenal ulcer

## 2025-02-03 ENCOUNTER — APPOINTMENT (OUTPATIENT)
Dept: MEDICAL GROUP | Facility: LAB | Age: 75
End: 2025-02-03
Payer: MEDICARE

## 2025-02-03 VITALS
HEART RATE: 106 BPM | BODY MASS INDEX: 33.11 KG/M2 | WEIGHT: 206 LBS | DIASTOLIC BLOOD PRESSURE: 60 MMHG | HEIGHT: 66 IN | RESPIRATION RATE: 16 BRPM | OXYGEN SATURATION: 92 % | TEMPERATURE: 97.9 F | SYSTOLIC BLOOD PRESSURE: 102 MMHG

## 2025-02-03 DIAGNOSIS — K26.9 DUODENAL ULCER: ICD-10-CM

## 2025-02-03 DIAGNOSIS — Z09 HOSPITAL DISCHARGE FOLLOW-UP: Primary | ICD-10-CM

## 2025-02-03 DIAGNOSIS — I10 ESSENTIAL HYPERTENSION: ICD-10-CM

## 2025-02-03 DIAGNOSIS — D50.0 BLOOD LOSS ANEMIA: ICD-10-CM

## 2025-02-03 DIAGNOSIS — E05.00 GRAVES DISEASE: ICD-10-CM

## 2025-02-03 PROBLEM — M17.9 OSTEOARTHRITIS, KNEE: Status: RESOLVED | Noted: 2024-11-04 | Resolved: 2025-02-03

## 2025-02-03 RX ORDER — LOSARTAN POTASSIUM 100 MG/1
100 TABLET ORAL
Qty: 90 TABLET | Refills: 2 | Status: SHIPPED | OUTPATIENT
Start: 2025-02-03

## 2025-02-03 RX ORDER — OMEPRAZOLE 20 MG/1
20 CAPSULE, DELAYED RELEASE ORAL 2 TIMES DAILY
Qty: 60 CAPSULE | Refills: 0 | Status: SHIPPED | OUTPATIENT
Start: 2025-02-03

## 2025-02-03 ASSESSMENT — ENCOUNTER SYMPTOMS
CHILLS: 0
FEVER: 0
SHORTNESS OF BREATH: 0

## 2025-02-03 ASSESSMENT — VISUAL ACUITY
OD_CC: 20/30
OS_CC: 20/30

## 2025-02-03 ASSESSMENT — FIBROSIS 4 INDEX: FIB4 SCORE: 0.97

## 2025-02-03 NOTE — PROGRESS NOTES
Subjective:     Madiha Garrett is a 74 y.o. female who presents for Hospital Follow-up.    Transitional Care Management  TCM Outreach Date and Time: Filed (1/21/2025 12:31 PM)    Discharge Questions  Actual Discharge Date: 01/20/25  Now that you are home, how are you feeling?: Good (Denies abd. pain or any bloody stools.  Is able to eat and drink normally.  No issues with right TKR; doing exercises and no pain issues.)  Did you receive any new prescriptions?: Yes  Were you able to get them filled?: Yes  Meds to Bed or Pharmacy filled?: Pharmacy  Do you have any questions about your current medications or new medications (Review Med Rec)?: No (Completed Med Rec; understands medication schedule; no longer using NSAID products)  Did you have any durable medical equipment ordered?: No (Has a walker at home from previous TKR sugery)  Do you have a follow up appointment scheduled with your PCP?: Yes  Appointment Date: 02/03/25  Appointment Time: 1320  Any issues or paperwork you wish to discuss with your PCP?: No  Are you (patient) able to get to the appointment?: Yes (Spouse)  If Home Health was ordered, have they contacted you (Patient): Not Applicable  Did you have enough support after your last discharge?: Yes (Spouse)  Does this patient qualify for the CCM program?: No    Transitional Care  Number of attempts made to contact patient: 1  Current or previous attempts completed within two business days of discharge? : Yes  Provided education regarding treatment plan, medications, self-management, ADLs?: Yes (Discussed S/S of return to ER; reviwed status of TKR)  Has patient completed an Advanced Directive?: Yes  Is the patient's advanced directive on file?: Yes  Has the Care Manager's phone number provided?: No  Is there anything else I can help you with?: No (Pt states doing well and has no further questions, issues or concerns.)    Discharge Summary  Chief Complaint: Bloody stools X24*  Admitting Diagnosis: Bloody  stools; S/P TKR 1/7/25;  HX:  Osteoarthritis; HTN  Discharge Diagnosis: Duodenal ulcer        HPI:   Patient was admitted to the hospital for 1/16 to 1/20 due to having dark stools.  Of note patient underwent a left knee arthroplasty on 1/7 and was nauseous and could not tolerate oral food and she was taking meloxicam on an empty stomach.  Patient was evaluated for GI bleed and had a significant drop in her hemoglobin.  She was also tachycardic on admission.  Patient underwent an EGD which showed a large duodenal ulcer with active bleeding which was apparently treated with epinephrine.  Patient was started on IV proton pump inhibitors.  The patient did require 2 blood transfusions and 1 platelet transfusion and eventually switched to omeprazole.  During patient's hospital stay she did develop a superficial venous thrombosis in her left arm which is where her IV was located at.  Her SVT improved with warm compress and elevation. She has a follow up EGD in March.    Current medicines (including reconciliation performed today)  Current Outpatient Medications   Medication Sig Dispense Refill    losartan (COZAAR) 100 MG Tab Take 1 Tablet by mouth every day. 90 Tablet 2    omeprazole (PRILOSEC) 20 MG delayed-release capsule Take 1 Capsule by mouth 2 times a day. 60 Capsule 0    acetaminophen (TYLENOL 8 HOUR ARTHRITIS PAIN) 650 MG CR tablet Take 1 Tablet by mouth every 6 hours as needed for Moderate Pain for up to 30 days. 120 Tablet 0    methimazole (TAPAZOLE) 5 MG Tab TAKE ONE-HALF TABLET BY MOUTH EVERY DAY 6 DAYS PER WEEK 45 Tablet 2    acetaminophen (TYLENOL) 500 MG Tab Take 1,000 mg by mouth 4 times a day.      ascorbic acid (ASCORBIC ACID) 500 MG Tab Take 500 mg by mouth every day.      vitamin D (CHOLECALCIFEROL) 1000 UNIT Tab Take 1,000 Units by mouth every day.       No current facility-administered medications for this visit.       Allergies:   Meloxicam, Tramadol, and Oxycodone    Social History     Tobacco Use  "   Smoking status: Former     Current packs/day: 0.00     Average packs/day: 0.5 packs/day for 27.2 years (13.6 ttl pk-yrs)     Types: Cigarettes     Start date: 1970     Quit date: 3/1/1997     Years since quittin.9     Passive exposure: Never    Smokeless tobacco: Never   Vaping Use    Vaping status: Never Used   Substance Use Topics    Alcohol use: Yes     Alcohol/week: 1.2 oz     Types: 2 Shots of liquor per week     Comment: 2 per week    Drug use: No       ROS:  Review of Systems   Constitutional:  Negative for chills and fever.   Respiratory:  Negative for shortness of breath.    Cardiovascular:  Negative for chest pain.         Objective:     Vitals:    25 1307   BP: 102/60   BP Location: Right arm   Patient Position: Sitting   BP Cuff Size: Large adult   Pulse: (!) 106   Resp: 16   Temp: 36.6 °C (97.9 °F)   TempSrc: Temporal   SpO2: 92%   Weight: 93.4 kg (206 lb)   Height: 1.676 m (5' 6\")     Body mass index is 33.25 kg/m².    Physical Exam:  Physical Exam  Constitutional:       General: She is not in acute distress.     Appearance: Normal appearance. She is not toxic-appearing.   Pulmonary:      Effort: Pulmonary effort is normal.   Musculoskeletal:      Cervical back: Normal range of motion.   Neurological:      Mental Status: She is alert.   Psychiatric:         Mood and Affect: Mood normal.         Behavior: Behavior normal.         Thought Content: Thought content normal.         Judgment: Judgment normal.           Assessment and Plan:   1. Hospital discharge follow-up    2. Blood loss anemia    3. Graves disease  - Referral to Endocrinology    4. Essential hypertension  - losartan (COZAAR) 100 MG Tab; Take 1 Tablet by mouth every day.  Dispense: 90 Tablet; Refill: 2    5. Duodenal ulcer  - omeprazole (PRILOSEC) 20 MG delayed-release capsule; Take 1 Capsule by mouth 2 times a day.  Dispense: 60 Capsule; Refill: 0      - Chart and discharge summary were reviewed.   - Hospitalization and " results reviewed with patient.   - Medications reviewed including instructions regarding high risk medications, dosing and side effects.  - Recommended Services: No services needed at this time  - Advance directive/POLST on file?  Yes    Follow-up:    Face-to-face transitional care management services with MODERATE (today's visit is within 14 days post discharge & LACE+ score of 28-58) medical decision complexity were provided.

## 2025-02-12 SDOH — ECONOMIC STABILITY: FOOD INSECURITY: WITHIN THE PAST 12 MONTHS, YOU WORRIED THAT YOUR FOOD WOULD RUN OUT BEFORE YOU GOT MONEY TO BUY MORE.: NEVER TRUE

## 2025-02-12 SDOH — HEALTH STABILITY: PHYSICAL HEALTH: ON AVERAGE, HOW MANY MINUTES DO YOU ENGAGE IN EXERCISE AT THIS LEVEL?: 60 MIN

## 2025-02-12 SDOH — ECONOMIC STABILITY: FOOD INSECURITY: WITHIN THE PAST 12 MONTHS, THE FOOD YOU BOUGHT JUST DIDN'T LAST AND YOU DIDN'T HAVE MONEY TO GET MORE.: NEVER TRUE

## 2025-02-12 SDOH — ECONOMIC STABILITY: INCOME INSECURITY: IN THE LAST 12 MONTHS, WAS THERE A TIME WHEN YOU WERE NOT ABLE TO PAY THE MORTGAGE OR RENT ON TIME?: NO

## 2025-02-12 SDOH — HEALTH STABILITY: PHYSICAL HEALTH: ON AVERAGE, HOW MANY DAYS PER WEEK DO YOU ENGAGE IN MODERATE TO STRENUOUS EXERCISE (LIKE A BRISK WALK)?: 3 DAYS

## 2025-02-12 SDOH — ECONOMIC STABILITY: INCOME INSECURITY: HOW HARD IS IT FOR YOU TO PAY FOR THE VERY BASICS LIKE FOOD, HOUSING, MEDICAL CARE, AND HEATING?: NOT HARD AT ALL

## 2025-02-12 ASSESSMENT — SOCIAL DETERMINANTS OF HEALTH (SDOH)
HOW HARD IS IT FOR YOU TO PAY FOR THE VERY BASICS LIKE FOOD, HOUSING, MEDICAL CARE, AND HEATING?: NOT HARD AT ALL
HOW OFTEN DO YOU HAVE A DRINK CONTAINING ALCOHOL: 2-3 TIMES A WEEK
WITHIN THE PAST 12 MONTHS, YOU WORRIED THAT YOUR FOOD WOULD RUN OUT BEFORE YOU GOT THE MONEY TO BUY MORE: NEVER TRUE
IN A TYPICAL WEEK, HOW MANY TIMES DO YOU TALK ON THE PHONE WITH FAMILY, FRIENDS, OR NEIGHBORS?: THREE TIMES A WEEK
HOW OFTEN DO YOU GET TOGETHER WITH FRIENDS OR RELATIVES?: ONCE A WEEK
IN A TYPICAL WEEK, HOW MANY TIMES DO YOU TALK ON THE PHONE WITH FAMILY, FRIENDS, OR NEIGHBORS?: THREE TIMES A WEEK
HOW OFTEN DO YOU ATTEND CHURCH OR RELIGIOUS SERVICES?: PATIENT DECLINED
HOW MANY DRINKS CONTAINING ALCOHOL DO YOU HAVE ON A TYPICAL DAY WHEN YOU ARE DRINKING: 1 OR 2
DO YOU BELONG TO ANY CLUBS OR ORGANIZATIONS SUCH AS CHURCH GROUPS UNIONS, FRATERNAL OR ATHLETIC GROUPS, OR SCHOOL GROUPS?: PATIENT DECLINED
HOW OFTEN DO YOU ATTENT MEETINGS OF THE CLUB OR ORGANIZATION YOU BELONG TO?: PATIENT DECLINED
HOW OFTEN DO YOU GET TOGETHER WITH FRIENDS OR RELATIVES?: ONCE A WEEK
HOW OFTEN DO YOU ATTEND CHURCH OR RELIGIOUS SERVICES?: PATIENT DECLINED
IN THE PAST 12 MONTHS, HAS THE ELECTRIC, GAS, OIL, OR WATER COMPANY THREATENED TO SHUT OFF SERVICE IN YOUR HOME?: NO
DO YOU BELONG TO ANY CLUBS OR ORGANIZATIONS SUCH AS CHURCH GROUPS UNIONS, FRATERNAL OR ATHLETIC GROUPS, OR SCHOOL GROUPS?: PATIENT DECLINED
HOW OFTEN DO YOU ATTENT MEETINGS OF THE CLUB OR ORGANIZATION YOU BELONG TO?: PATIENT DECLINED
HOW OFTEN DO YOU HAVE SIX OR MORE DRINKS ON ONE OCCASION: NEVER

## 2025-02-12 ASSESSMENT — LIFESTYLE VARIABLES
HOW OFTEN DO YOU HAVE A DRINK CONTAINING ALCOHOL: 2-3 TIMES A WEEK
HOW MANY STANDARD DRINKS CONTAINING ALCOHOL DO YOU HAVE ON A TYPICAL DAY: 1 OR 2
AUDIT-C TOTAL SCORE: 3
SKIP TO QUESTIONS 9-10: 1
HOW OFTEN DO YOU HAVE SIX OR MORE DRINKS ON ONE OCCASION: NEVER

## 2025-02-14 ENCOUNTER — APPOINTMENT (OUTPATIENT)
Dept: MEDICAL GROUP | Facility: LAB | Age: 75
End: 2025-02-14
Payer: MEDICARE

## 2025-02-26 ENCOUNTER — HOSPITAL ENCOUNTER (OUTPATIENT)
Dept: LAB | Facility: MEDICAL CENTER | Age: 75
End: 2025-02-26
Attending: NURSE PRACTITIONER
Payer: MEDICARE

## 2025-02-26 LAB
BASOPHILS # BLD AUTO: 1.1 % (ref 0–1.8)
BASOPHILS # BLD: 0.05 K/UL (ref 0–0.12)
EOSINOPHIL # BLD AUTO: 0.21 K/UL (ref 0–0.51)
EOSINOPHIL NFR BLD: 4.5 % (ref 0–6.9)
ERYTHROCYTE [DISTWIDTH] IN BLOOD BY AUTOMATED COUNT: 53.1 FL (ref 35.9–50)
FERRITIN SERPL-MCNC: 55.1 NG/ML (ref 10–291)
HCT VFR BLD AUTO: 40.4 % (ref 37–47)
HGB BLD-MCNC: 12.8 G/DL (ref 12–16)
IMM GRANULOCYTES # BLD AUTO: 0.01 K/UL (ref 0–0.11)
IMM GRANULOCYTES NFR BLD AUTO: 0.2 % (ref 0–0.9)
IRON SATN MFR SERPL: 13 % (ref 15–55)
IRON SERPL-MCNC: 45 UG/DL (ref 40–170)
LYMPHOCYTES # BLD AUTO: 1.77 K/UL (ref 1–4.8)
LYMPHOCYTES NFR BLD: 37.9 % (ref 22–41)
MCH RBC QN AUTO: 31.3 PG (ref 27–33)
MCHC RBC AUTO-ENTMCNC: 31.7 G/DL (ref 32.2–35.5)
MCV RBC AUTO: 98.8 FL (ref 81.4–97.8)
MONOCYTES # BLD AUTO: 0.57 K/UL (ref 0–0.85)
MONOCYTES NFR BLD AUTO: 12.2 % (ref 0–13.4)
NEUTROPHILS # BLD AUTO: 2.06 K/UL (ref 1.82–7.42)
NEUTROPHILS NFR BLD: 44.1 % (ref 44–72)
NRBC # BLD AUTO: 0 K/UL
NRBC BLD-RTO: 0 /100 WBC (ref 0–0.2)
PLATELET # BLD AUTO: 286 K/UL (ref 164–446)
PMV BLD AUTO: 10.1 FL (ref 9–12.9)
RBC # BLD AUTO: 4.09 M/UL (ref 4.2–5.4)
TIBC SERPL-MCNC: 345 UG/DL (ref 250–450)
UIBC SERPL-MCNC: 300 UG/DL (ref 110–370)
WBC # BLD AUTO: 4.7 K/UL (ref 4.8–10.8)

## 2025-02-26 PROCEDURE — 36415 COLL VENOUS BLD VENIPUNCTURE: CPT

## 2025-02-26 PROCEDURE — 85025 COMPLETE CBC W/AUTO DIFF WBC: CPT

## 2025-02-26 PROCEDURE — 82728 ASSAY OF FERRITIN: CPT

## 2025-02-26 PROCEDURE — 83540 ASSAY OF IRON: CPT

## 2025-02-26 PROCEDURE — 83550 IRON BINDING TEST: CPT

## 2025-03-18 ENCOUNTER — OFFICE VISIT (OUTPATIENT)
Dept: MEDICAL GROUP | Facility: LAB | Age: 75
End: 2025-03-18
Payer: MEDICARE

## 2025-03-18 VITALS
HEIGHT: 66 IN | OXYGEN SATURATION: 96 % | RESPIRATION RATE: 16 BRPM | BODY MASS INDEX: 34.23 KG/M2 | WEIGHT: 212.96 LBS | TEMPERATURE: 97.2 F | DIASTOLIC BLOOD PRESSURE: 80 MMHG | SYSTOLIC BLOOD PRESSURE: 130 MMHG | HEART RATE: 82 BPM

## 2025-03-18 DIAGNOSIS — E66.811 CLASS 1 OBESITY DUE TO EXCESS CALORIES WITH SERIOUS COMORBIDITY AND BODY MASS INDEX (BMI) OF 34.0 TO 34.9 IN ADULT: ICD-10-CM

## 2025-03-18 DIAGNOSIS — K26.9 DUODENAL ULCER: ICD-10-CM

## 2025-03-18 DIAGNOSIS — E66.9 OBESITY (BMI 30-39.9): ICD-10-CM

## 2025-03-18 DIAGNOSIS — E66.09 CLASS 1 OBESITY DUE TO EXCESS CALORIES WITH SERIOUS COMORBIDITY AND BODY MASS INDEX (BMI) OF 34.0 TO 34.9 IN ADULT: ICD-10-CM

## 2025-03-18 DIAGNOSIS — Z12.31 ENCOUNTER FOR SCREENING MAMMOGRAM FOR MALIGNANT NEOPLASM OF BREAST: ICD-10-CM

## 2025-03-18 PROBLEM — D62 ACUTE BLOOD LOSS ANEMIA: Status: RESOLVED | Noted: 2025-01-16 | Resolved: 2025-03-18

## 2025-03-18 PROCEDURE — G0439 PPPS, SUBSEQ VISIT: HCPCS | Performed by: STUDENT IN AN ORGANIZED HEALTH CARE EDUCATION/TRAINING PROGRAM

## 2025-03-18 PROCEDURE — 3075F SYST BP GE 130 - 139MM HG: CPT | Performed by: STUDENT IN AN ORGANIZED HEALTH CARE EDUCATION/TRAINING PROGRAM

## 2025-03-18 PROCEDURE — 3079F DIAST BP 80-89 MM HG: CPT | Performed by: STUDENT IN AN ORGANIZED HEALTH CARE EDUCATION/TRAINING PROGRAM

## 2025-03-18 RX ORDER — OMEPRAZOLE 20 MG/1
20 CAPSULE, DELAYED RELEASE ORAL 2 TIMES DAILY
Qty: 60 CAPSULE | Refills: 0 | Status: SHIPPED | OUTPATIENT
Start: 2025-03-18

## 2025-03-18 ASSESSMENT — ACTIVITIES OF DAILY LIVING (ADL): BATHING_REQUIRES_ASSISTANCE: 0

## 2025-03-18 ASSESSMENT — ENCOUNTER SYMPTOMS: GENERAL WELL-BEING: GOOD

## 2025-03-18 ASSESSMENT — PATIENT HEALTH QUESTIONNAIRE - PHQ9: CLINICAL INTERPRETATION OF PHQ2 SCORE: 0

## 2025-03-18 ASSESSMENT — FIBROSIS 4 INDEX: FIB4 SCORE: 0.79

## 2025-03-18 NOTE — PROGRESS NOTES
"Chief Complaint   Patient presents with    Annual Wellness Visit       HPI:  Madiha Garrett is a 74 y.o. here for Medicare Annual Wellness Visit     Problem   Class 1 Obesity Due to Excess Calories With Serious Comorbidity and Body Mass Index (Bmi) of 34.0 to 34.9 in Adult    Patient has been on zepbound 5mg.    Per previous documentation  \"Patient Tried and failed   --Phentermine-Side effect of palpitations  -Contrave- side effect of nausea and vomiting   -Qsymia - dizziness \"     Acute Blood Loss Anemia (Resolved)         Patient Active Problem List    Diagnosis Date Noted    Left upper extremity swelling 01/19/2025    Duodenal ulcer 01/17/2025    PONV (postoperative nausea and vomiting) 01/07/2025    Primary osteoarthritis of left knee 11/04/2024    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    Other constipation 11/07/2023    Body mass index (BMI) 35.0-35.9, adult 09/27/2022    Vitamin D deficiency 10/27/2020    Post-menopause 10/24/2019    History of colonic polyps 10/22/2019    Osteopenia of multiple sites 10/12/2019    Prediabetes 07/31/2018    Graves disease 04/05/2018    Hyperthyroidism 07/14/2017    Mixed hyperlipidemia 03/01/2017    Essential hypertension 03/01/2017    Primary osteoarthritis of both knees 03/01/2017    Class 1 obesity due to excess calories with serious comorbidity and body mass index (BMI) of 34.0 to 34.9 in adult        Current Outpatient Medications   Medication Sig Dispense Refill    [START ON 6/18/2025] Tirzepatide-Weight Management 7.5 MG/0.5ML Solution vial Inject 0.5 mL under the skin every 7 days for 84 days. 6 mL 0    sulfamethoxazole-trimethoprim (BACTRIM DS) 800-160 MG tablet       losartan (COZAAR) 100 MG Tab Take 1 Tablet by mouth every day. 90 Tablet 2    omeprazole (PRILOSEC) 20 MG delayed-release capsule Take 1 Capsule by mouth 2 times a day. 60 Capsule 0    methimazole (TAPAZOLE) 5 MG Tab TAKE ONE-HALF TABLET BY MOUTH EVERY DAY 6 DAYS PER WEEK 45 Tablet 2    " acetaminophen (TYLENOL) 500 MG Tab Take 1,000 mg by mouth 4 times a day.      ascorbic acid (ASCORBIC ACID) 500 MG Tab Take 500 mg by mouth every day.      vitamin D (CHOLECALCIFEROL) 1000 UNIT Tab Take 1,000 Units by mouth every day.       No current facility-administered medications for this visit.          Current supplements as per medication list.     Allergies: Meloxicam, Tramadol, and Oxycodone    Current social contact/activities:     She  reports that she quit smoking about 28 years ago. Her smoking use included cigarettes. She started smoking about 55 years ago. She has a 13.6 pack-year smoking history. She has never been exposed to tobacco smoke. She has never used smokeless tobacco. She reports current alcohol use of about 1.2 oz of alcohol per week. She reports that she does not use drugs.  Counseling given: Not Answered      ROS:    Gait: Uses no assistive device  Ostomy: No  Other tubes: No  Amputations: No  Chronic oxygen use: No  Last eye exam: 3/2024  Wears hearing aids: No   : Reports urinary leakage during the last 6 months that has interfered a lot with their daily activities or sleep.    Screening:    Depression Screening  Little interest or pleasure in doing things?  0 - not at all  Feeling down, depressed , or hopeless? 0 - not at all  Trouble falling or staying asleep, or sleeping too much?     Feeling tired or having little energy?     Poor appetite or overeating?     Feeling bad about yourself - or that you are a failure or have let yourself or your family down?    Trouble concentrating on things, such as reading the newspaper or watching television?    Moving or speaking so slowly that other people could have noticed.  Or the opposite - being so fidgety or restless that you have been moving around a lot more than usual?     Thoughts that you would be better off dead, or of hurting yourself?     Patient Health Questionnaire Score:      If depressive symptoms identified deferred to follow  up visit unless specifically addressed in assessment and plan.    Interpretation of PHQ-9 Total Score   Score Severity   1-4 No Depression   5-9 Mild Depression   10-14 Moderate Depression   15-19 Moderately Severe Depression   20-27 Severe Depression    Screening for Cognitive Impairment  Do you or any of your friends or family members have any concern about your memory? No  Three Minute Recall (Village, Kitchen, Baby) 3/3    Thor clock face with all 12 numbers and set the hands to show 10 minutes past 11.  Yes    Cognitive concerns identified deferred for follow up unless specifically addressed in assessment and plan.    Fall Risk Assessment  Has the patient had two or more falls in the last year or any fall with injury in the last year?  No    Safety Assessment  Do you always wear your seatbelt?  Yes  Any changes to home needed to function safely? No  Difficulty hearing.  No  Patient counseled about all safety risks that were identified.    Functional Assessment ADLs  Are there any barriers preventing you from cooking for yourself or meeting nutritional needs?  No.    Are there any barriers preventing you from driving safely or obtaining transportation?  No.    Are there any barriers preventing you from using a telephone or calling for help?  No    Are there any barriers preventing you from shopping?  No.    Are there any barriers preventing you from taking care of your own finances?  No    Are there any barriers preventing you from managing your medications?  No    Are there any barriers preventing you from showering, bathing or dressing yourself? No    Are there any barriers preventing you from doing housework or laundry? No    Are there any barriers preventing you from using the toilet?No    Are you currently engaging in any exercise or physical activity?  Yes. Bike, rowing   weights    Self-Assessment of Health  What is your perception of your health? Good    Do you sleep more than six hours a night? Yes     In the past 7 days, how much did pain keep you from doing your normal work? None    Do you spend quality time with family or friends (virtually or in person)? Yes    Do you usually eat a heart healthy diet that constists of a variety of fruits, vegetables, whole grains and fiber? Yes    Do you eat foods high in fat and/or Fast Food more than three times per week? No    How concerned are you that your medical conditions are not being well managed? Not at all    Are you worried that in the next 2 months, you may not have stable housing that you own, rent, or stay in as part of a household? No        Advance Care Planning  Do you have an Advance Directive, Living Will, Durable Power of , or POLST? Yes  Advance Directive       is on file      Health Maintenance Summary            Needs Review       Hepatitis C Screening  Tentatively Complete      10/31/2019  Hepatitis C Antibody component of HEP C VIRUS ANTIBODY              Bone Density Scan (Every 5 Years) Tentatively due on 2/1/2026 02/01/2021  DS-BONE DENSITY STUDY (DEXA)    12/11/2019  DS-BONE DENSITY STUDY (DEXA)    08/29/2018  DS-BONE DENSITY STUDY (DEXA)    08/31/2012  DS-BONE DENSITY STUDY (DEXA)              Colorectal Cancer Screening (Colonoscopy - Every 5 Years) Tentatively due on 5/15/2028      05/15/2023  AMB EXTERNAL COLONOSCOPY RESULTS    05/15/2023  AMB EXTERNAL COLONOSCOPY RESULTS    11/15/2019  REFERRAL TO GI FOR COLONOSCOPY    02/20/2016  REFERRAL TO GI FOR COLONOSCOPY    02/17/2016  REFERRAL TO GI FOR COLONOSCOPY     Only the first 5 history entries have been loaded, but more history exists.                    Awaiting Completion       Mammogram (Yearly) Order placed this encounter      03/18/2025  Order placed for MA-SCREENING MAMMO BILAT W/TOMOSYNTHESIS W/CAD by Samir Amos D.O.    02/23/2024  MA-SCREENING MAMMO BILAT W/TOMOSYNTHESIS W/CAD    02/03/2022  MA-SCREENING MAMMO BILAT W/TOMOSYNTHESIS W/CAD    12/11/2019  MA-SCREENING  MAMMO BILAT W/TOMOSYNTHESIS W/CAD    08/31/2018  MA-DIAGNOSTIC DIGITAL MAMMO-UNILAT LEFT      Only the first 5 history entries have been loaded, but more history exists.                      Upcoming       IMM DTaP/Tdap/Td Vaccine (2 - Td or Tdap) Next due on 1/31/2033 01/31/2023  Imm Admin: Tdap Vaccine                      Completed or No Longer Recommended       Influenza Vaccine (Series Information) Completed      09/19/2024  Imm Admin: Influenza split virus trivalent (PF)    09/25/2023  Outside Immunization: Flu MDCK Quad P-Free Inj    09/30/2022  Imm Admin: Influenza Vaccine Quad Inj (Pf)    09/30/2021  Imm Admin: Influenza Vaccine Quad Inj (Pf)    09/07/2020  Imm Admin: Influenza Vaccine Quad Inj (Pf)      Only the first 5 history entries have been loaded, but more history exists.              Zoster (Shingles) Vaccines (Series Information) Completed      07/21/2020  Imm Admin: Zoster Vaccine Recombinant (RZV) (SHINGRIX)    03/09/2020  Imm Admin: Zoster Vaccine Recombinant (RZV) (SHINGRIX)              COVID-19 Vaccine (Series Information) Completed      10/17/2024  Imm Admin: COVID-19, mRNA, LNP-S, PF, ángel-sucrose, 30 mcg/0.3 mL    09/25/2023  Imm Admin: Comirnaty (Covid-19 Vaccine, Mrna, [id number] Formula)    11/03/2022  Imm Admin: PFIZER BIVALENT SARS-COV-2 VACCINE (12+)    04/13/2022  Imm Admin: PFIZER MONTEZ CAP SARS-COV-2 VACCINATION (12+)    11/04/2021  Imm Admin: PFIZER PURPLE CAP SARS-COV-2 VACCINATION (12+)      Only the first 5 history entries have been loaded, but more history exists.              Pneumococcal Vaccine: 50+ Years (Series Information) Completed      10/22/2019  Imm Admin: Pneumococcal polysaccharide vaccine (PPSV-23)    07/26/2018  Imm Admin: Pneumococcal Conjugate Vaccine (Prevnar/PCV-13)              Hepatitis A Vaccine (Hep A) (Series Information) Aged Out      No completion history exists for this topic.              Hepatitis B Vaccine (Hep B) (Series Information) Aged Out      No completion history exists for this topic.              HPV Vaccines (Series Information) Aged Out     No completion history exists for this topic.              Polio Vaccine (Inactivated Polio) (Series Information) Aged Out     No completion history exists for this topic.              Meningococcal Immunization (Series Information) Aged Out     No completion history exists for this topic.              Annual Wellness Visit  Discontinued      2024  Visit Dx: Medicare annual wellness visit, subsequent    2023  Visit Dx: Encounter for Medicare annual wellness exam    2023  Level of Service: HI ANNUAL WELLNESS VISIT-INCLUDES PPPS SUBSEQUE*    10/28/2021  Visit Dx: Medicare annual wellness visit, subsequent    10/28/2021  Level of Service: HI ANNUAL WELLNESS VISIT-INCLUDES PPPS SUBSEQUE*     Only the first 5 history entries have been loaded, but more history exists.                          Patient Care Team:  Samir Amos D.O. as PCP - General (Internal Medicine)  Kwan Fleming M.D. (Phys Med and Rehab)  CORTEZ CrumRCesarNCesar (Nurse Practitioner Family)      Social History     Tobacco Use    Smoking status: Former     Current packs/day: 0.00     Average packs/day: 0.5 packs/day for 27.2 years (13.6 ttl pk-yrs)     Types: Cigarettes     Start date: 1970     Quit date: 3/1/1997     Years since quittin.0     Passive exposure: Never    Smokeless tobacco: Never   Vaping Use    Vaping status: Never Used   Substance Use Topics    Alcohol use: Yes     Alcohol/week: 1.2 oz     Types: 2 Shots of liquor per week     Comment: 2 per week    Drug use: No     Family History   Problem Relation Age of Onset    Arthritis Mother     Heart Disease Father         arteriosclerosis    Hypertension Father     Alcohol/Drug Sister     Alcohol/Drug Brother      She  has a past medical history of Arthritis, Bowel habit changes, Hyperlipidemia, Hypertension, Hyperthyroidism, and Urinary incontinence.    She  "has no past medical history of Encounter for long-term (current) use of other medications.   Past Surgical History:   Procedure Laterality Date    IA UPPER GI ENDOSCOPY,DIAGNOSIS N/A 1/16/2025    Procedure: GASTROSCOPY;  Surgeon: Rosalie Romero M.D.;  Location: SURGERY [facility];  Service: Gastroenterology    IA UPPER GI ENDOSCOPY,W/DIR SUBMUC INJ N/A 1/16/2025    Procedure: EGD, WITH DIRECTED SUBMUCOSAL INJECTION;  Surgeon: Rosalie Romero M.D.;  Location: SURGERY [facility];  Service: Gastroenterology    ARTHROPLASTY, KNEE, ROBOT-ASSISTED Left 1/7/2025    Procedure: ROBOTIC LEFT TOTAL KNEE ARTHROPLASTY;  Surgeon: David Santa M.D.;  Location: SURGERY [facility];  Service: Ortho Robotic    KNEE REPLACEMENT, TOTAL Right 09/27/2018    Approximate date in 2018 [facility]    HYSTEROSCOPY WITH VIDEO DIAGNOSTIC      ectopic pregnancy, and appendix removal       Exam:   /80 (BP Location: Right arm, Patient Position: Sitting, BP Cuff Size: Large adult)   Pulse 82   Temp 36.2 °C (97.2 °F) (Temporal)   Resp 16   Ht 1.676 m (5' 6\")   Wt 96.6 kg (212 lb 15.4 oz)   SpO2 96%  Body mass index is 34.37 kg/m².    Hearing fair.    Dentition fair  Alert, oriented in no acute distress.  Eye contact is good, speech goal directed, affect calm    Assessment and Plan. The following treatment and monitoring plan is recommended:    1. Encounter for screening mammogram for malignant neoplasm of breast  - MA-SCREENING MAMMO BILAT W/TOMOSYNTHESIS W/CAD; Future    2. Obesity (BMI 30-39.9)  - Tirzepatide-Weight Management 7.5 MG/0.5ML Solution vial; Inject 0.5 mL under the skin every 7 days for 84 days.  Dispense: 6 mL; Refill: 0    3. Class 1 obesity due to excess calories with serious comorbidity and body mass index (BMI) of 34.0 to 34.9 in adult      Services suggested: No services needed at this time  Health Care Screening: Age-appropriate preventive services recommended by USPTF and " ACIP covered by Medicare were discussed today. Services ordered if indicated and agreed upon by the patient.  Referrals offered: Community-based lifestyle interventions to reduce health risks and promote self-management and wellness, fall prevention, nutrition, physical activity, tobacco-use cessation, weight loss, and mental health services as per orders if indicated.    Discussion today about general wellness and lifestyle habits:    Prevent falls and reduce trip hazards; Cautioned about securing or removing rugs.  Have a working fire alarm and carbon monoxide detector;   Engage in regular physical activity and social activities     Follow-up:6 month weight follow up

## 2025-03-20 DIAGNOSIS — E66.9 OBESITY (BMI 30-39.9): ICD-10-CM

## 2025-03-24 ENCOUNTER — TELEPHONE (OUTPATIENT)
Dept: MEDICAL GROUP | Facility: LAB | Age: 75
End: 2025-03-24

## 2025-03-24 NOTE — TELEPHONE ENCOUNTER
MEDICATION PRIOR AUTHORIZATION NEEDED:    1. Name of Medication: Zepbound 7.5MG/0.5ML pen-injectors    2. Requested By (Name of Pharmacy): [facility]     3. Is insurance on file current? YES    4. What is the name & phone number of the 3rd party payor?  (Key: [id number])

## 2025-03-31 DIAGNOSIS — N30.00 ACUTE CYSTITIS WITHOUT HEMATURIA: ICD-10-CM

## 2025-03-31 RX ORDER — NITROFURANTOIN 25; 75 MG/1; MG/1
100 CAPSULE ORAL 2 TIMES DAILY
Qty: 10 CAPSULE | Refills: 0 | Status: SHIPPED | OUTPATIENT
Start: 2025-03-31 | End: 2025-04-05

## 2025-04-01 NOTE — TELEPHONE ENCOUNTER
[employer] is unable to retrieve the clinical questions that must be submitted to initiate the PA request. Please see more information at the bottom of the page for next steps.

## 2025-04-08 ENCOUNTER — APPOINTMENT (OUTPATIENT)
Dept: RADIOLOGY | Facility: MEDICAL CENTER | Age: 75
End: 2025-04-08
Attending: STUDENT IN AN ORGANIZED HEALTH CARE EDUCATION/TRAINING PROGRAM
Payer: MEDICARE

## 2025-04-08 DIAGNOSIS — Z12.31 ENCOUNTER FOR SCREENING MAMMOGRAM FOR MALIGNANT NEOPLASM OF BREAST: ICD-10-CM

## 2025-04-08 PROCEDURE — 77067 SCR MAMMO BI INCL CAD: CPT

## 2025-04-10 ENCOUNTER — RESULTS FOLLOW-UP (OUTPATIENT)
Dept: MEDICAL GROUP | Facility: LAB | Age: 75
End: 2025-04-10

## 2025-06-16 DIAGNOSIS — E66.9 OBESITY (BMI 30-39.9): Primary | ICD-10-CM

## 2025-06-16 RX ORDER — TIRZEPATIDE 10 MG/.5ML
10 INJECTION, SOLUTION SUBCUTANEOUS
Qty: 2 ML | Refills: 0 | Status: SHIPPED | OUTPATIENT
Start: 2025-06-16 | End: 2025-07-14

## 2025-07-16 DIAGNOSIS — E66.9 OBESITY (BMI 30-39.9): ICD-10-CM

## 2025-07-16 RX ORDER — TIRZEPATIDE 10 MG/.5ML
INJECTION, SOLUTION SUBCUTANEOUS
Qty: 2 ML | Refills: 0 | Status: SHIPPED | OUTPATIENT
Start: 2025-07-16

## 2025-08-08 DIAGNOSIS — E66.9 OBESITY (BMI 30-39.9): ICD-10-CM

## 2025-08-08 RX ORDER — TIRZEPATIDE 10 MG/.5ML
INJECTION, SOLUTION SUBCUTANEOUS
Qty: 2 ML | Refills: 0 | Status: SHIPPED | OUTPATIENT
Start: 2025-08-08

## (undated) DEVICE — TUBING CLEARLINK DUO-VENT - C-FLO (48EA/CA)

## (undated) DEVICE — FORCEP RADIAL JAW 4 STANDARD CAPACITY W/NEEDLE 240CM (40EA/BX)

## (undated) DEVICE — BLADE 90X18X1.27MM SAW SAGITTAL

## (undated) DEVICE — SUTURE GENERAL

## (undated) DEVICE — SODIUM CHL IRRIGATION 0.9% 1000ML (12EA/CA)

## (undated) DEVICE — LACTATED RINGERS INJ 1000 ML - (14EA/CA 60CA/PF)

## (undated) DEVICE — WATER IRRIGATION STERILE 1000ML (12EA/CA)

## (undated) DEVICE — PAD PREP 24 X 48 CUFFED - (100/CA)

## (undated) DEVICE — MASK WITH FACE SHIELD (25/BX 4BX/CA)

## (undated) DEVICE — BLADE SAGITTAL SAW DUAL CUT 25.0 X 90.0 X 1.27MM (1/EA)

## (undated) DEVICE — MASK O2 VNYL ADLT RBRTH HI - (50/CS)

## (undated) DEVICE — SET EXTENSION WITH 2 PORTS (48EA/CA) ***PART #2C8610 IS A SUBSTITUTE*****

## (undated) DEVICE — ELECTRODE DUAL RETURN W/ CORD - (50/PK)

## (undated) DEVICE — SYRINGE DISP. 60 CC LL - (30/BX, 12BX/CA)**WHEN THESE ARE GONE ORDER #500206**

## (undated) DEVICE — SYRINGE SAFETY 5 ML 18 GA X 1-1/2 BLUNT LL (100/BX 4BX/CA)

## (undated) DEVICE — GLOVE BIOGEL PI INDICATOR SZ 8.0 SURGICAL PF LF -(50/BX 4BX/CA)

## (undated) DEVICE — SUTURE 3-0 MONOCRYL PLUS PS-1 - 27 INCH (36/BX)

## (undated) DEVICE — SUTURE 5 ETHIBOND V-37 (12PK/BX)

## (undated) DEVICE — HUMID-VENT HEAT AND MOISTURE EXCHANGE- (50/BX)

## (undated) DEVICE — KIT CUSTOM PROCEDURE SINGLE FOR ENDO (15/CA)

## (undated) DEVICE — SUCTION INSTRUMENT YANKAUER BULBOUS TIP W/O VENT (50EA/CA)

## (undated) DEVICE — SPONGE GAUZE NON-STERILE 4X4 - (2000/CA 10PK/CA)

## (undated) DEVICE — SYSTEM NAVIGATION VIZADISC KNEE PROCEDURE TRACKING KIT (1EA)

## (undated) DEVICE — SENSOR OXIMETER ADULT SPO2 RD SET (20EA/BX)

## (undated) DEVICE — GOWN SURGEONS LARGE - (32/CA)

## (undated) DEVICE — STOCKINETTE IMPERVIOUS 12X48 - STERILELF (10/CA)"

## (undated) DEVICE — HANDPIECE 10FT INTPLS SCT PLS IRRIGATION HAND CONTROL SET (6/PK)

## (undated) DEVICE — PIN FIXATION BONE STERILE 3.2MM X 110MM (2EA/PK)

## (undated) DEVICE — Device

## (undated) DEVICE — PIN FIXATION BONE STERILE 3.2MM X 140MM (2EA/PK)

## (undated) DEVICE — SUTURE 2-0 MONOCRYL PLUS UNDYED CT-1 1 X 36 (36EA/BX)"

## (undated) DEVICE — SET LEADWIRE 5 LEAD BEDSIDE DISPOSABLE ECG (1SET OF 5/EA)

## (undated) DEVICE — DRAPE LARGE 3 QUARTER - (20/CA)

## (undated) DEVICE — CANISTER SUCTION RIGID RED 1500CC (40EA/CA)

## (undated) DEVICE — KIT DRAPE RIO ONE PIECE WITH POCKETS(10EA MINIMUM ORDER)  (1EA)

## (undated) DEVICE — BLOCK BITE ENDOSCOPIC 2809 - (100/BX) INTERMEDIATE

## (undated) DEVICE — GLOVE BIOGEL PI ORTHO SZ 7.5 PF LF (40PR/BX)

## (undated) DEVICE — DERMABOND ADVANCED - (12EA/BX)

## (undated) DEVICE — BLADE SAGITTAL 34MM

## (undated) DEVICE — GLOVE BIOGEL SZ 8 SURGICAL PF LTX - (50PR/BX 4BX/CA)

## (undated) DEVICE — TUBE SUCTION YANKAUER 1/4 X 6FT (50EA/CA)"

## (undated) DEVICE — SOD. CHL. INJ. 0.9% 1000 ML - (14EA/CA 60CA/PF)

## (undated) DEVICE — CUFF BP ADULT LARGE DISPOSABLE (20EA/CA)

## (undated) DEVICE — CATHERTER CLEAR SINGLE USE INJECTION THERAPY NEEDLE 25GA X 4MM 2.3MM X 240CM (5EA/BX)

## (undated) DEVICE — GOLD PROBE 7FR (5/BX)

## (undated) DEVICE — TIP INTPLS HFLO ML ORFC BTRY - (12/CS) FOR SURGILAV